# Patient Record
Sex: MALE | Race: WHITE | NOT HISPANIC OR LATINO | Employment: FULL TIME
[De-identification: names, ages, dates, MRNs, and addresses within clinical notes are randomized per-mention and may not be internally consistent; named-entity substitution may affect disease eponyms.]

---

## 2017-01-23 ENCOUNTER — HOSPITAL (OUTPATIENT)
Dept: OTHER | Age: 42
End: 2017-01-23
Attending: ANESTHESIOLOGY

## 2017-01-31 ENCOUNTER — HOSPITAL (OUTPATIENT)
Dept: OTHER | Age: 42
End: 2017-01-31
Attending: ANESTHESIOLOGY

## 2017-03-02 ENCOUNTER — HOSPITAL (OUTPATIENT)
Dept: OTHER | Age: 42
End: 2017-03-02
Attending: FAMILY MEDICINE

## 2017-03-17 ENCOUNTER — HOSPITAL (OUTPATIENT)
Dept: OTHER | Age: 42
End: 2017-03-17
Attending: ANESTHESIOLOGY

## 2017-04-07 ENCOUNTER — HOSPITAL (OUTPATIENT)
Dept: OTHER | Age: 42
End: 2017-04-07
Attending: ANESTHESIOLOGY

## 2017-09-18 ENCOUNTER — HOSPITAL (OUTPATIENT)
Dept: OTHER | Age: 42
End: 2017-09-18
Attending: ANESTHESIOLOGY

## 2017-10-20 ENCOUNTER — HOSPITAL (OUTPATIENT)
Dept: OTHER | Age: 42
End: 2017-10-20
Attending: ANESTHESIOLOGY

## 2017-11-13 ENCOUNTER — HOSPITAL (OUTPATIENT)
Dept: OTHER | Age: 42
End: 2017-11-13
Attending: ANESTHESIOLOGY

## 2017-11-21 ENCOUNTER — HOSPITAL (OUTPATIENT)
Dept: OTHER | Age: 42
End: 2017-11-21
Attending: ANESTHESIOLOGY

## 2018-01-12 ENCOUNTER — HOSPITAL (OUTPATIENT)
Dept: OTHER | Age: 43
End: 2018-01-12
Attending: ANESTHESIOLOGY

## 2018-02-22 ENCOUNTER — HOSPITAL (OUTPATIENT)
Dept: OTHER | Age: 43
End: 2018-02-22
Attending: ANESTHESIOLOGY

## 2018-05-11 ENCOUNTER — HOSPITAL (OUTPATIENT)
Dept: OTHER | Age: 43
End: 2018-05-11
Attending: ANESTHESIOLOGY

## 2018-07-23 ENCOUNTER — HOSPITAL (OUTPATIENT)
Dept: OTHER | Age: 43
End: 2018-07-23
Attending: ANESTHESIOLOGY

## 2018-08-24 ENCOUNTER — HOSPITAL (OUTPATIENT)
Dept: OTHER | Age: 43
End: 2018-08-24
Attending: ANESTHESIOLOGY

## 2018-09-12 ENCOUNTER — HOSPITAL (OUTPATIENT)
Dept: OTHER | Age: 43
End: 2018-09-12
Attending: INTERNAL MEDICINE

## 2018-09-12 LAB
A/G RATIO_: 1.3
ABS LYMPH MAN: 0.9 K/CUMM (ref 1–3.5)
ABS MONO MAN: 0.1 K/CUMM (ref 0.1–0.8)
ABS NEUT MAN: 13.6 K/CUMM (ref 1.8–7.8)
ALBUMIN: 4.5 G/DL (ref 3.5–5)
ALK PHOS: 77 UNIT/L (ref 50–124)
ALT/GPT: 19 UNIT/L (ref 0–55)
ANION GAP SERPL CALC-SCNC: 18 MEQ/L (ref 10–20)
AST/GOT: 21 UNIT/L (ref 5–34)
BAND MAN: 14 % (ref 2–8)
BASOPHIL MAN: 0 % (ref 0–1)
BILI TOTAL: 0.4 MG/DL (ref 0.2–1)
BUN SERPL-MCNC: 21 MG/DL (ref 6–20)
CALCIUM: 10.3 MG/DL (ref 8.4–10.2)
CHLORIDE: 100 MEQ/L (ref 97–107)
CREATININE: 1.12 MG/DL (ref 0.6–1.3)
DIFF_TYPE?: ABNORMAL
EOS MAN: 0 % (ref 0–4)
GLOBULIN_: 3.4 G/DL (ref 2–4.1)
GLUCOSE LVL: 171 MG/DL (ref 70–99)
HCT VFR BLD CALC: 49 % (ref 36–51)
HEMOLYSIS 2+: NEGATIVE
HGB BLD-MCNC: 16.8 G/DL (ref 12–17)
ICTERIC 4+: NEGATIVE
INSTR WBC: 14.6 K/CUMM (ref 4–11)
LIPASE LEVEL: 8 UNIT/L (ref 8–78)
LIPEMIC 3+: NEGATIVE
LYMPH MAN: 6 %
MCH RBC QN AUTO: 30 PG (ref 25–35)
MCHC RBC AUTO-ENTMCNC: 34 G/DL (ref 32–37)
MCV RBC AUTO: 88 FL (ref 78–97)
MONOCYTE MAN: 1 %
NRBC BLD MANUAL-RTO: 0 % (ref 0–0.2)
PLATELET: 289 K/CUMM (ref 150–450)
PLT ESTIMATE: ADEQUATE
POTASSIUM: 4.3 MEQ/L (ref 3.5–5.1)
RBC # BLD: 5.53 M/CUMM (ref 4.2–6)
RBC MORPH: NORMAL
RDW: 12.1 % (ref 11.5–14.5)
SEGS MAN: 79 %
SODIUM: 139 MEQ/L (ref 136–145)
TCO2: 25 MEQ/L (ref 19–29)
TOTAL LYMPHS MANUAL: 6 %
TOTAL PROTEIN: 7.9 G/DL (ref 6.4–8.3)
UA APPEAR: CLEAR
UA BACTERIA: ABNORMAL
UA BILI: NEGATIVE
UA BLOOD: ABNORMAL
UA COLOR: YELLOW
UA EPITHELIAL: ABNORMAL
UA GLUCOSE: NEGATIVE
UA KETONES: NEGATIVE
UA LEUK EST: NEGATIVE
UA NITRITE: NEGATIVE
UA PH: 5.5 (ref 5–7)
UA PROTEIN: NEGATIVE
UA RBC: ABNORMAL
UA SPEC GRAV: 1.02 (ref 1.01–1.02)
UA UROBILINOGEN: 0.2 MG/DL (ref 0.2–1)
UA WBC: ABNORMAL
WBC # BLD: 14.6 K/CUMM (ref 4–11)

## 2018-09-13 LAB
ABS LYMPH MAN: 0.5 K/CUMM (ref 1–3.5)
ABS MONO MAN: 0.9 K/CUMM (ref 0.1–0.8)
ABS NEUT MAN: 5.4 K/CUMM (ref 1.8–7.8)
ANION GAP SERPL CALC-SCNC: 14 MEQ/L (ref 10–20)
BAND MAN: 15 % (ref 2–8)
BASOPHIL MAN: 0 % (ref 0–1)
BUN SERPL-MCNC: 24 MG/DL (ref 6–20)
CALCIUM: 8.8 MG/DL (ref 8.4–10.2)
CHLORIDE: 106 MEQ/L (ref 97–107)
CREATININE: 0.92 MG/DL (ref 0.6–1.3)
DIFF_TYPE?: ABNORMAL
EOS MAN: 2 % (ref 0–4)
GLUCOSE LVL: 137 MG/DL (ref 70–99)
HCT VFR BLD CALC: 43 % (ref 36–51)
HEMOLYSIS 2+: NEGATIVE
HGB BLD-MCNC: 14.7 G/DL (ref 12–17)
INSTR WBC: 6.9 K/CUMM (ref 4–11)
LYMPH MAN: 5 %
MCH RBC QN AUTO: 31 PG (ref 25–35)
MCHC RBC AUTO-ENTMCNC: 34 G/DL (ref 32–37)
MCV RBC AUTO: 90 FL (ref 78–97)
MONOCYTE MAN: 13 %
NRBC BLD MANUAL-RTO: 0 % (ref 0–0.2)
PLATELET: 236 K/CUMM (ref 150–450)
PLT ESTIMATE: ADEQUATE
POTASSIUM: 4.5 MEQ/L (ref 3.5–5.1)
RBC # BLD: 4.74 M/CUMM (ref 4.2–6)
RBC MORPH: NORMAL
RDW: 12.5 % (ref 11.5–14.5)
REACT LYMPH MAN: 2 %
SEGS MAN: 63 %
SODIUM: 138 MEQ/L (ref 136–145)
TCO2: 23 MEQ/L (ref 19–29)
TOTAL LYMPHS MANUAL: 7 %
WBC # BLD: 6.9 K/CUMM (ref 4–11)

## 2018-09-14 LAB
ABS LYMPH MAN: 1.8 K/CUMM (ref 1–3.5)
ABS MONO MAN: 0.2 K/CUMM (ref 0.1–0.8)
ABS NEUT MAN: 4.3 K/CUMM (ref 1.8–7.8)
ANION GAP SERPL CALC-SCNC: 9 MEQ/L (ref 10–20)
BAND MAN: 3 % (ref 2–8)
BASOPHIL MAN: 0 % (ref 0–1)
BUN SERPL-MCNC: 23 MG/DL (ref 6–20)
CALCIUM: 8.7 MG/DL (ref 8.4–10.2)
CHLORIDE: 105 MEQ/L (ref 97–107)
CREATININE: 0.95 MG/DL (ref 0.6–1.3)
DIFF_TYPE?: ABNORMAL
EOS MAN: 2 % (ref 0–4)
GLUCOSE LVL: 105 MG/DL (ref 70–99)
HCT VFR BLD CALC: 37 % (ref 36–51)
HEMOLYSIS 2+: NEGATIVE
HEMOLYSIS 2+: NEGATIVE
HEMOLYSIS 3+: NEGATIVE
HGB BLD-MCNC: 12.4 G/DL (ref 12–17)
INSTR WBC: 6.4 K/CUMM (ref 4–11)
LIPEMIC 4+: NEGATIVE
LYMPH MAN: 27 %
MAGNESIUM LEVEL: 2.3 MG/DL (ref 1.6–2.6)
MCH RBC QN AUTO: 31 PG (ref 25–35)
MCHC RBC AUTO-ENTMCNC: 33 G/DL (ref 32–37)
MCV RBC AUTO: 92 FL (ref 78–97)
MONOCYTE MAN: 3 %
NRBC BLD MANUAL-RTO: 0 % (ref 0–0.2)
PHOSPHORUS: 2.3 MG/DL (ref 2.3–4.7)
PLATELET: 182 K/CUMM (ref 150–450)
PLT ESTIMATE: ADEQUATE
POTASSIUM: 4.7 MEQ/L (ref 3.5–5.1)
RBC # BLD: 4.05 M/CUMM (ref 4.2–6)
RBC MORPH: NORMAL
RDW: 12.1 % (ref 11.5–14.5)
REACT LYMPH MAN: 1 %
SEGS MAN: 64 %
SODIUM: 139 MEQ/L (ref 136–145)
TCO2: 30 MEQ/L (ref 19–29)
TOTAL LYMPHS MANUAL: 28 %
UA APPEAR: CLEAR
UA BILI: NEGATIVE
UA BLOOD: NEGATIVE
UA COLOR: YELLOW
UA GLUCOSE: NEGATIVE
UA KETONES: NEGATIVE
UA LEUK EST: NEGATIVE
UA NITRITE: NEGATIVE
UA PH: 7 (ref 5–7)
UA PROTEIN: NEGATIVE
UA SPEC GRAV: 1.01 (ref 1.01–1.02)
UA UROBILINOGEN: 0.2 MG/DL (ref 0.2–1)
WBC # BLD: 6.4 K/CUMM (ref 4–11)

## 2018-09-15 LAB
ANION GAP SERPL CALC-SCNC: 9 MEQ/L (ref 10–20)
BUN SERPL-MCNC: 11 MG/DL (ref 6–20)
CALCIUM: 8.8 MG/DL (ref 8.4–10.2)
CHLORIDE: 107 MEQ/L (ref 97–107)
CREATININE: 0.82 MG/DL (ref 0.6–1.3)
GLUCOSE LVL: 113 MG/DL (ref 70–99)
HEMOLYSIS 2+: NEGATIVE
HEMOLYSIS 2+: NEGATIVE
MAGNESIUM LEVEL: 2.1 MG/DL (ref 1.6–2.6)
POTASSIUM: 4.7 MEQ/L (ref 3.5–5.1)
SODIUM: 140 MEQ/L (ref 136–145)
TCO2: 29 MEQ/L (ref 19–29)

## 2019-04-08 ENCOUNTER — HOSPITAL (OUTPATIENT)
Dept: OTHER | Age: 44
End: 2019-04-08
Attending: ANESTHESIOLOGY

## 2019-05-03 ENCOUNTER — HOSPITAL (OUTPATIENT)
Dept: OTHER | Age: 44
End: 2019-05-03
Attending: ANESTHESIOLOGY

## 2019-06-04 ENCOUNTER — HOSPITAL (OUTPATIENT)
Dept: OTHER | Age: 44
End: 2019-06-04

## 2019-06-04 ENCOUNTER — HOSPITAL (OUTPATIENT)
Dept: OTHER | Age: 44
End: 2019-06-04
Attending: ANESTHESIOLOGY

## 2020-07-02 PROBLEM — Z12.5 SCREENING FOR PROSTATE CANCER: Status: ACTIVE | Noted: 2019-06-05

## 2020-07-02 PROBLEM — E55.9 VITAMIN D DEFICIENCY: Status: ACTIVE | Noted: 2019-07-10

## 2020-07-02 PROBLEM — H92.01 RIGHT EAR PAIN: Status: ACTIVE | Noted: 2020-02-26

## 2020-07-02 PROBLEM — E66.3 OVERWEIGHT WITH BODY MASS INDEX (BMI) 25.0-29.9: Status: ACTIVE | Noted: 2019-07-10

## 2020-07-02 PROBLEM — G43.909 MIGRAINE: Status: ACTIVE | Noted: 2020-07-02

## 2020-07-02 PROBLEM — F41.9 ANXIETY: Status: ACTIVE | Noted: 2020-07-02

## 2020-07-06 ENCOUNTER — HOSPITAL ENCOUNTER (OUTPATIENT)
Dept: GENERAL RADIOLOGY | Age: 45
Discharge: HOME | End: 2020-07-06
Attending: INTERNAL MEDICINE
Payer: COMMERCIAL

## 2020-07-06 DIAGNOSIS — M54.14 THORACIC RADICULOPATHY: ICD-10-CM

## 2020-07-06 DIAGNOSIS — M51.36 LUMBAR DEGENERATIVE DISC DISEASE: ICD-10-CM

## 2020-07-06 PROCEDURE — 72110 X-RAY EXAM L-2 SPINE 4/>VWS: CPT | Performed by: INTERNAL MEDICINE

## 2020-07-06 PROCEDURE — 72072 X-RAY EXAM THORAC SPINE 3VWS: CPT | Performed by: INTERNAL MEDICINE

## 2020-09-21 ENCOUNTER — HOSPITAL (OUTPATIENT)
Dept: OTHER | Age: 45
End: 2020-09-21
Attending: FAMILY MEDICINE

## 2020-12-03 ENCOUNTER — HOSPITAL (OUTPATIENT)
Dept: OTHER | Age: 45
End: 2020-12-03

## 2020-12-04 LAB
SARS-COV-2 RNA RESP QL NAA+PROBE: NOT DETECTED
SPECIMEN SOURCE: NORMAL

## 2021-03-08 ENCOUNTER — HOSPITAL ENCOUNTER (EMERGENCY)
Age: 46
Discharge: HOME | End: 2021-03-08

## 2021-03-08 ENCOUNTER — WALK IN (OUTPATIENT)
Dept: URGENT CARE | Age: 46
End: 2021-03-08
Attending: EMERGENCY MEDICINE

## 2021-03-08 ENCOUNTER — APPOINTMENT (OUTPATIENT)
Dept: CT IMAGING | Age: 46
End: 2021-03-08

## 2021-03-08 VITALS
HEIGHT: 72 IN | WEIGHT: 185 LBS | SYSTOLIC BLOOD PRESSURE: 119 MMHG | HEART RATE: 67 BPM | TEMPERATURE: 98.3 F | DIASTOLIC BLOOD PRESSURE: 78 MMHG | OXYGEN SATURATION: 95 % | BODY MASS INDEX: 25.06 KG/M2 | RESPIRATION RATE: 17 BRPM

## 2021-03-08 VITALS
TEMPERATURE: 98.1 F | SYSTOLIC BLOOD PRESSURE: 119 MMHG | DIASTOLIC BLOOD PRESSURE: 79 MMHG | RESPIRATION RATE: 14 BRPM | OXYGEN SATURATION: 97 % | BODY MASS INDEX: 24.41 KG/M2 | WEIGHT: 185 LBS | HEART RATE: 68 BPM

## 2021-03-08 DIAGNOSIS — J02.9 SORE THROAT: Primary | ICD-10-CM

## 2021-03-08 DIAGNOSIS — R10.9 ABDOMINAL PAIN, UNSPECIFIED ABDOMINAL LOCATION: Primary | ICD-10-CM

## 2021-03-08 DIAGNOSIS — K59.00 CONSTIPATION, UNSPECIFIED CONSTIPATION TYPE: ICD-10-CM

## 2021-03-08 LAB
ALBUMIN SERPL-MCNC: 3.6 G/DL (ref 3.6–5.1)
ALBUMIN/GLOB SERPL: 1.2 {RATIO} (ref 1–2.4)
ALP SERPL-CCNC: 61 UNITS/L (ref 45–117)
ALT SERPL-CCNC: 87 UNITS/L
ANION GAP SERPL CALC-SCNC: 10 MMOL/L (ref 10–20)
APPEARANCE UR: CLEAR
AST SERPL-CCNC: 40 UNITS/L
BASOPHILS # BLD: 0.1 K/MCL (ref 0–0.3)
BASOPHILS NFR BLD: 1 %
BILIRUB SERPL-MCNC: 0.3 MG/DL (ref 0.2–1)
BILIRUB UR QL STRIP: NEGATIVE
BUN SERPL-MCNC: 16 MG/DL (ref 6–20)
BUN/CREAT SERPL: 18 (ref 7–25)
CALCIUM SERPL-MCNC: 8.4 MG/DL (ref 8.4–10.2)
CHLORIDE SERPL-SCNC: 104 MMOL/L (ref 98–107)
CO2 SERPL-SCNC: 27 MMOL/L (ref 21–32)
COLOR UR: YELLOW
CREAT SERPL-MCNC: 0.87 MG/DL (ref 0.67–1.17)
DEPRECATED RDW RBC: 40.1 FL (ref 39–50)
EOSINOPHIL # BLD: 0.4 K/MCL (ref 0–0.5)
EOSINOPHIL NFR BLD: 5 %
ERYTHROCYTE [DISTWIDTH] IN BLOOD: 12.2 % (ref 11–15)
FASTING DURATION TIME PATIENT: ABNORMAL H
GFR SERPLBLD BASED ON 1.73 SQ M-ARVRAT: >90 ML/MIN/1.73M2
GLOBULIN SER-MCNC: 3.1 G/DL (ref 2–4)
GLUCOSE SERPL-MCNC: 90 MG/DL (ref 65–99)
GLUCOSE UR STRIP-MCNC: NEGATIVE MG/DL
HCT VFR BLD CALC: 40.7 % (ref 39–51)
HGB BLD-MCNC: 13.9 G/DL (ref 13–17)
HGB UR QL STRIP: NEGATIVE
IMM GRANULOCYTES # BLD AUTO: 0 K/MCL (ref 0–0.2)
IMM GRANULOCYTES # BLD: 0 %
INTERNAL PROCEDURAL CONTROLS ACCEPTABLE: YES
KETONES UR STRIP-MCNC: NEGATIVE MG/DL
LACTATE BLDV-SCNC: 0.6 MMOL/L (ref 0–2)
LEUKOCYTE ESTERASE UR QL STRIP: NEGATIVE
LIPASE SERPL-CCNC: 200 UNITS/L (ref 73–393)
LYMPHOCYTES # BLD: 2.5 K/MCL (ref 1–4.8)
LYMPHOCYTES NFR BLD: 35 %
MCH RBC QN AUTO: 31 PG (ref 26–34)
MCHC RBC AUTO-ENTMCNC: 34.2 G/DL (ref 32–36.5)
MCV RBC AUTO: 90.6 FL (ref 78–100)
MONOCYTES # BLD: 0.7 K/MCL (ref 0.3–0.9)
MONOCYTES NFR BLD: 9 %
NEUTROPHILS # BLD: 3.6 K/MCL (ref 1.8–7.7)
NEUTROPHILS NFR BLD: 50 %
NITRITE UR QL STRIP: NEGATIVE
NRBC BLD MANUAL-RTO: 0 /100 WBC
PH UR STRIP: 6 [PH] (ref 5–7)
PLATELET # BLD AUTO: 249 K/MCL (ref 140–450)
POTASSIUM SERPL-SCNC: 4.7 MMOL/L (ref 3.4–5.1)
PROT SERPL-MCNC: 6.7 G/DL (ref 6.4–8.2)
PROT UR STRIP-MCNC: NEGATIVE MG/DL
RBC # BLD: 4.49 MIL/MCL (ref 4.5–5.9)
S PYO AG THROAT QL IA.RAPID: NEGATIVE
SARS-COV+SARS-COV-2 AG RESP QL IA.RAPID: NOT DETECTED
SODIUM SERPL-SCNC: 136 MMOL/L (ref 135–145)
SP GR UR STRIP: 1.01 (ref 1–1.03)
UROBILINOGEN UR STRIP-MCNC: 0.2 MG/DL
WBC # BLD: 7.2 K/MCL (ref 4.2–11)

## 2021-03-08 PROCEDURE — 74177 CT ABD & PELVIS W/CONTRAST: CPT

## 2021-03-08 PROCEDURE — 81003 URINALYSIS AUTO W/O SCOPE: CPT | Performed by: NURSE PRACTITIONER

## 2021-03-08 PROCEDURE — 10002807 HB RX 258: Performed by: NURSE PRACTITIONER

## 2021-03-08 PROCEDURE — 96361 HYDRATE IV INFUSION ADD-ON: CPT

## 2021-03-08 PROCEDURE — 83605 ASSAY OF LACTIC ACID: CPT | Performed by: NURSE PRACTITIONER

## 2021-03-08 PROCEDURE — 96374 THER/PROPH/DIAG INJ IV PUSH: CPT

## 2021-03-08 PROCEDURE — 87426 SARSCOV CORONAVIRUS AG IA: CPT | Performed by: EMERGENCY MEDICINE

## 2021-03-08 PROCEDURE — 83690 ASSAY OF LIPASE: CPT | Performed by: NURSE PRACTITIONER

## 2021-03-08 PROCEDURE — 85025 COMPLETE CBC W/AUTO DIFF WBC: CPT | Performed by: NURSE PRACTITIONER

## 2021-03-08 PROCEDURE — 10002805 HB CONTRAST AGENT: Performed by: NURSE PRACTITIONER

## 2021-03-08 PROCEDURE — 96375 TX/PRO/DX INJ NEW DRUG ADDON: CPT

## 2021-03-08 PROCEDURE — 10002800 HB RX 250 W HCPCS: Performed by: PHYSICIAN ASSISTANT

## 2021-03-08 PROCEDURE — 10002800 HB RX 250 W HCPCS: Performed by: NURSE PRACTITIONER

## 2021-03-08 PROCEDURE — 87880 STREP A ASSAY W/OPTIC: CPT

## 2021-03-08 PROCEDURE — 99204 OFFICE O/P NEW MOD 45 MIN: CPT

## 2021-03-08 PROCEDURE — 80053 COMPREHEN METABOLIC PANEL: CPT | Performed by: NURSE PRACTITIONER

## 2021-03-08 PROCEDURE — C9803 HOPD COVID-19 SPEC COLLECT: HCPCS

## 2021-03-08 PROCEDURE — 99284 EMERGENCY DEPT VISIT MOD MDM: CPT

## 2021-03-08 RX ORDER — POLYETHYLENE GLYCOL 3350 17 G/17G
17 POWDER, FOR SOLUTION ORAL DAILY
Qty: 255 G | Refills: 0 | Status: SHIPPED | OUTPATIENT
Start: 2021-03-08

## 2021-03-08 RX ORDER — ONDANSETRON 2 MG/ML
4 INJECTION INTRAMUSCULAR; INTRAVENOUS ONCE
Status: COMPLETED | OUTPATIENT
Start: 2021-03-08 | End: 2021-03-08

## 2021-03-08 RX ADMIN — MORPHINE SULFATE 2 MG: 2 INJECTION, SOLUTION INTRAMUSCULAR; INTRAVENOUS at 12:23

## 2021-03-08 RX ADMIN — ONDANSETRON 4 MG: 2 INJECTION INTRAMUSCULAR; INTRAVENOUS at 12:23

## 2021-03-08 RX ADMIN — IOHEXOL 75 ML: 350 INJECTION, SOLUTION INTRAVENOUS at 14:15

## 2021-03-08 RX ADMIN — KETOROLAC TROMETHAMINE 30 MG: 30 INJECTION, SOLUTION INTRAMUSCULAR; INTRAVENOUS at 15:17

## 2021-03-08 RX ADMIN — SODIUM CHLORIDE 1000 ML: 0.9 INJECTION, SOLUTION INTRAVENOUS at 12:23

## 2021-03-08 ASSESSMENT — ENCOUNTER SYMPTOMS
ANOREXIA: 0
PSYCHIATRIC NEGATIVE: 1
ENDOCRINE NEGATIVE: 1
NAUSEA: 1
VOMITING: 1
BACK PAIN: 0
ENDOCRINE NEGATIVE: 1
NEUROLOGICAL NEGATIVE: 1
CONSTIPATION: 1
SHORTNESS OF BREATH: 0
ABDOMINAL PAIN: 1
PSYCHIATRIC NEGATIVE: 1
HEMATOLOGIC/LYMPHATIC NEGATIVE: 1
EYES NEGATIVE: 1
FEVER: 0
EYES NEGATIVE: 1
CONSTITUTIONAL NEGATIVE: 1
DIARRHEA: 0
RESPIRATORY NEGATIVE: 1
RESPIRATORY NEGATIVE: 1
FATIGUE: 1
HEMATOLOGIC/LYMPHATIC NEGATIVE: 1
NEUROLOGICAL NEGATIVE: 1
ALLERGIC/IMMUNOLOGIC NEGATIVE: 1
DIAPHORESIS: 0
ALLERGIC/IMMUNOLOGIC NEGATIVE: 1
GASTROINTESTINAL NEGATIVE: 1

## 2021-03-08 ASSESSMENT — PAIN SCALES - GENERAL: PAINLEVEL_OUTOF10: 6

## 2021-04-16 ENCOUNTER — WALK IN (OUTPATIENT)
Dept: URGENT CARE | Age: 46
End: 2021-04-16
Attending: FAMILY MEDICINE

## 2021-04-16 DIAGNOSIS — R11.11 VOMITING WITHOUT NAUSEA, INTRACTABILITY OF VOMITING NOT SPECIFIED, UNSPECIFIED VOMITING TYPE: Primary | ICD-10-CM

## 2021-04-16 DIAGNOSIS — J30.1 SEASONAL ALLERGIC RHINITIS DUE TO POLLEN: ICD-10-CM

## 2021-04-16 LAB — SARS-COV+SARS-COV-2 AG RESP QL IA.RAPID: NOT DETECTED

## 2021-04-16 PROCEDURE — C9803 HOPD COVID-19 SPEC COLLECT: HCPCS

## 2021-04-16 PROCEDURE — 87426 SARSCOV CORONAVIRUS AG IA: CPT | Performed by: FAMILY MEDICINE

## 2021-04-16 PROCEDURE — 99213 OFFICE O/P EST LOW 20 MIN: CPT

## 2021-04-16 RX ORDER — ALPRAZOLAM 0.5 MG/1
0.5 TABLET ORAL NIGHTLY PRN
COMMUNITY

## 2021-04-16 RX ORDER — FLUTICASONE PROPIONATE 50 MCG
2 SPRAY, SUSPENSION (ML) NASAL DAILY
Qty: 16 G | Refills: 12 | Status: SHIPPED | OUTPATIENT
Start: 2021-04-16

## 2021-04-16 RX ORDER — MONTELUKAST SODIUM 10 MG/1
10 TABLET ORAL NIGHTLY
Qty: 30 TABLET | Refills: 0 | Status: SHIPPED | OUTPATIENT
Start: 2021-04-16

## 2021-04-16 RX ORDER — HYDROCODONE BITARTRATE AND ACETAMINOPHEN 10; 325 MG/1; MG/1
1 TABLET ORAL EVERY 6 HOURS PRN
COMMUNITY

## 2021-04-16 ASSESSMENT — PAIN SCALES - GENERAL: PAINLEVEL: 0

## 2021-04-21 ASSESSMENT — ENCOUNTER SYMPTOMS
BRUISES/BLEEDS EASILY: 0
VOMITING: 1
FATIGUE: 0
SORE THROAT: 0
COUGH: 0
EYE REDNESS: 0
RHINORRHEA: 0
FEVER: 0
DIARRHEA: 0
ADENOPATHY: 0
CHILLS: 0
BACK PAIN: 0
CONFUSION: 0
AGITATION: 0
HEADACHES: 0
NAUSEA: 0
SHORTNESS OF BREATH: 0
WEAKNESS: 0
ABDOMINAL PAIN: 0

## 2021-05-06 ENCOUNTER — HOSPITAL ENCOUNTER (EMERGENCY)
Age: 46
Discharge: HOME | End: 2021-05-06
Attending: EMERGENCY MEDICINE

## 2021-05-06 VITALS
TEMPERATURE: 97.9 F | OXYGEN SATURATION: 97 % | SYSTOLIC BLOOD PRESSURE: 108 MMHG | BODY MASS INDEX: 24.62 KG/M2 | DIASTOLIC BLOOD PRESSURE: 64 MMHG | HEIGHT: 72 IN | HEART RATE: 74 BPM | RESPIRATION RATE: 18 BRPM | WEIGHT: 181.77 LBS

## 2021-05-06 DIAGNOSIS — T50.Z95A ADVERSE EFFECT OF VACCINE, INITIAL ENCOUNTER: Primary | ICD-10-CM

## 2021-05-06 LAB
ALBUMIN SERPL-MCNC: 3.7 G/DL (ref 3.6–5.1)
ALBUMIN/GLOB SERPL: 1.2 {RATIO} (ref 1–2.4)
ALP SERPL-CCNC: 62 UNITS/L (ref 45–117)
ALT SERPL-CCNC: 27 UNITS/L
ANION GAP SERPL CALC-SCNC: 13 MMOL/L (ref 10–20)
AST SERPL-CCNC: 15 UNITS/L
BASOPHILS # BLD: 0.1 K/MCL (ref 0–0.3)
BASOPHILS NFR BLD: 1 %
BILIRUB SERPL-MCNC: 0.2 MG/DL (ref 0.2–1)
BUN SERPL-MCNC: 12 MG/DL (ref 6–20)
BUN/CREAT SERPL: 16 (ref 7–25)
CALCIUM SERPL-MCNC: 9 MG/DL (ref 8.4–10.2)
CHLORIDE SERPL-SCNC: 104 MMOL/L (ref 98–107)
CO2 SERPL-SCNC: 27 MMOL/L (ref 21–32)
CREAT SERPL-MCNC: 0.77 MG/DL (ref 0.67–1.17)
DEPRECATED RDW RBC: 39.4 FL (ref 39–50)
EOSINOPHIL # BLD: 0.3 K/MCL (ref 0–0.5)
EOSINOPHIL NFR BLD: 7 %
ERYTHROCYTE [DISTWIDTH] IN BLOOD: 11.9 % (ref 11–15)
FASTING DURATION TIME PATIENT: ABNORMAL H
GFR SERPLBLD BASED ON 1.73 SQ M-ARVRAT: >90 ML/MIN/1.73M2
GLOBULIN SER-MCNC: 3.1 G/DL (ref 2–4)
GLUCOSE SERPL-MCNC: 102 MG/DL (ref 65–99)
HCT VFR BLD CALC: 40.6 % (ref 39–51)
HGB BLD-MCNC: 13.5 G/DL (ref 13–17)
IMM GRANULOCYTES # BLD AUTO: 0 K/MCL (ref 0–0.2)
IMM GRANULOCYTES # BLD: 0 %
LIPASE SERPL-CCNC: 190 UNITS/L (ref 73–393)
LYMPHOCYTES # BLD: 1.8 K/MCL (ref 1–4.8)
LYMPHOCYTES NFR BLD: 42 %
MCH RBC QN AUTO: 30.1 PG (ref 26–34)
MCHC RBC AUTO-ENTMCNC: 33.3 G/DL (ref 32–36.5)
MCV RBC AUTO: 90.6 FL (ref 78–100)
MONOCYTES # BLD: 0.5 K/MCL (ref 0.3–0.9)
MONOCYTES NFR BLD: 12 %
NEUTROPHILS # BLD: 1.6 K/MCL (ref 1.8–7.7)
NEUTROPHILS NFR BLD: 38 %
NRBC BLD MANUAL-RTO: 0 /100 WBC
PLATELET # BLD AUTO: 226 K/MCL (ref 140–450)
POTASSIUM SERPL-SCNC: 4.2 MMOL/L (ref 3.4–5.1)
PROT SERPL-MCNC: 6.8 G/DL (ref 6.4–8.2)
RBC # BLD: 4.48 MIL/MCL (ref 4.5–5.9)
SODIUM SERPL-SCNC: 140 MMOL/L (ref 135–145)
WBC # BLD: 4.2 K/MCL (ref 4.2–11)

## 2021-05-06 PROCEDURE — 96375 TX/PRO/DX INJ NEW DRUG ADDON: CPT

## 2021-05-06 PROCEDURE — 10002807 HB RX 258: Performed by: EMERGENCY MEDICINE

## 2021-05-06 PROCEDURE — 83690 ASSAY OF LIPASE: CPT | Performed by: EMERGENCY MEDICINE

## 2021-05-06 PROCEDURE — 85025 COMPLETE CBC W/AUTO DIFF WBC: CPT | Performed by: EMERGENCY MEDICINE

## 2021-05-06 PROCEDURE — 10002800 HB RX 250 W HCPCS: Performed by: EMERGENCY MEDICINE

## 2021-05-06 PROCEDURE — 80053 COMPREHEN METABOLIC PANEL: CPT | Performed by: EMERGENCY MEDICINE

## 2021-05-06 PROCEDURE — 96361 HYDRATE IV INFUSION ADD-ON: CPT

## 2021-05-06 PROCEDURE — 96374 THER/PROPH/DIAG INJ IV PUSH: CPT

## 2021-05-06 PROCEDURE — 99284 EMERGENCY DEPT VISIT MOD MDM: CPT

## 2021-05-06 RX ORDER — ONDANSETRON 2 MG/ML
4 INJECTION INTRAMUSCULAR; INTRAVENOUS ONCE
Status: COMPLETED | OUTPATIENT
Start: 2021-05-06 | End: 2021-05-06

## 2021-05-06 RX ORDER — ONDANSETRON 4 MG/1
4 TABLET, ORALLY DISINTEGRATING ORAL EVERY 8 HOURS PRN
Qty: 12 TABLET | Refills: 0 | Status: SHIPPED | OUTPATIENT
Start: 2021-05-06

## 2021-05-06 RX ADMIN — KETOROLAC TROMETHAMINE 15 MG: 30 INJECTION, SOLUTION INTRAMUSCULAR; INTRAVENOUS at 04:57

## 2021-05-06 RX ADMIN — SODIUM CHLORIDE 1000 ML: 0.9 INJECTION, SOLUTION INTRAVENOUS at 04:50

## 2021-05-06 RX ADMIN — ONDANSETRON 4 MG: 2 INJECTION INTRAMUSCULAR; INTRAVENOUS at 04:50

## 2021-05-06 ASSESSMENT — PAIN SCALES - GENERAL: PAINLEVEL_OUTOF10: 0

## 2021-05-25 VITALS
WEIGHT: 185 LBS | HEIGHT: 73 IN | BODY MASS INDEX: 24.52 KG/M2 | SYSTOLIC BLOOD PRESSURE: 128 MMHG | RESPIRATION RATE: 16 BRPM | DIASTOLIC BLOOD PRESSURE: 87 MMHG | OXYGEN SATURATION: 100 % | TEMPERATURE: 98.1 F | HEART RATE: 75 BPM

## 2021-06-02 ENCOUNTER — HOSPITAL ENCOUNTER (OUTPATIENT)
Dept: ULTRASOUND IMAGING | Facility: HOSPITAL | Age: 46
Discharge: HOME | End: 2021-06-02
Attending: INTERNAL MEDICINE
Payer: COMMERCIAL

## 2021-06-02 DIAGNOSIS — R10.84 GENERALIZED ABDOMINAL PAIN: ICD-10-CM

## 2021-06-02 PROCEDURE — 76700 US EXAM ABDOM COMPLETE: CPT | Performed by: INTERNAL MEDICINE

## 2021-06-10 ENCOUNTER — LAB ENCOUNTER (OUTPATIENT)
Dept: LAB | Age: 46
End: 2021-06-10
Attending: SURGERY
Payer: COMMERCIAL

## 2021-06-10 DIAGNOSIS — Z01.818 PREOP TESTING: ICD-10-CM

## 2021-06-12 ENCOUNTER — ANESTHESIA (OUTPATIENT)
Dept: SURGERY | Facility: HOSPITAL | Age: 46
End: 2021-06-12
Payer: COMMERCIAL

## 2021-06-12 ENCOUNTER — HOSPITAL ENCOUNTER (OUTPATIENT)
Facility: HOSPITAL | Age: 46
Setting detail: HOSPITAL OUTPATIENT SURGERY
Discharge: HOME | End: 2021-06-12
Attending: SURGERY | Admitting: SURGERY
Payer: COMMERCIAL

## 2021-06-12 ENCOUNTER — ANESTHESIA EVENT (OUTPATIENT)
Dept: SURGERY | Facility: HOSPITAL | Age: 46
End: 2021-06-12
Payer: COMMERCIAL

## 2021-06-12 VITALS
SYSTOLIC BLOOD PRESSURE: 141 MMHG | HEART RATE: 69 BPM | DIASTOLIC BLOOD PRESSURE: 74 MMHG | HEIGHT: 72 IN | WEIGHT: 188.63 LBS | BODY MASS INDEX: 25.55 KG/M2 | OXYGEN SATURATION: 97 % | TEMPERATURE: 98 F | RESPIRATION RATE: 18 BRPM

## 2021-06-12 DIAGNOSIS — Z01.818 PREOP TESTING: Primary | ICD-10-CM

## 2021-06-12 PROCEDURE — 88304 TISSUE EXAM BY PATHOLOGIST: CPT | Performed by: SURGERY

## 2021-06-12 PROCEDURE — 0FT44ZZ RESECTION OF GALLBLADDER, PERCUTANEOUS ENDOSCOPIC APPROACH: ICD-10-PCS | Performed by: SURGERY

## 2021-06-12 RX ORDER — MORPHINE SULFATE 10 MG/ML
6 INJECTION, SOLUTION INTRAMUSCULAR; INTRAVENOUS EVERY 10 MIN PRN
Status: DISCONTINUED | OUTPATIENT
Start: 2021-06-12 | End: 2021-06-12

## 2021-06-12 RX ORDER — SODIUM CHLORIDE 9 MG/ML
INJECTION, SOLUTION INTRAVENOUS CONTINUOUS
Status: CANCELLED | OUTPATIENT
Start: 2021-06-12

## 2021-06-12 RX ORDER — CEFAZOLIN SODIUM/WATER 2 G/20 ML
2 SYRINGE (ML) INTRAVENOUS ONCE
Status: COMPLETED | OUTPATIENT
Start: 2021-06-12 | End: 2021-06-12

## 2021-06-12 RX ORDER — DEXAMETHASONE SODIUM PHOSPHATE 4 MG/ML
VIAL (ML) INJECTION AS NEEDED
Status: DISCONTINUED | OUTPATIENT
Start: 2021-06-12 | End: 2021-06-12 | Stop reason: SURG

## 2021-06-12 RX ORDER — PROCHLORPERAZINE EDISYLATE 5 MG/ML
5 INJECTION INTRAMUSCULAR; INTRAVENOUS ONCE AS NEEDED
Status: DISCONTINUED | OUTPATIENT
Start: 2021-06-12 | End: 2021-06-12

## 2021-06-12 RX ORDER — ACETAMINOPHEN 500 MG
1000 TABLET ORAL ONCE
Status: COMPLETED | OUTPATIENT
Start: 2021-06-12 | End: 2021-06-12

## 2021-06-12 RX ORDER — HYDROCODONE BITARTRATE AND ACETAMINOPHEN 5; 325 MG/1; MG/1
2 TABLET ORAL AS NEEDED
Status: COMPLETED | OUTPATIENT
Start: 2021-06-12 | End: 2021-06-12

## 2021-06-12 RX ORDER — NALOXONE HYDROCHLORIDE 0.4 MG/ML
80 INJECTION, SOLUTION INTRAMUSCULAR; INTRAVENOUS; SUBCUTANEOUS AS NEEDED
Status: DISCONTINUED | OUTPATIENT
Start: 2021-06-12 | End: 2021-06-12

## 2021-06-12 RX ORDER — ONDANSETRON 2 MG/ML
4 INJECTION INTRAMUSCULAR; INTRAVENOUS EVERY 6 HOURS PRN
Status: CANCELLED | OUTPATIENT
Start: 2021-06-12

## 2021-06-12 RX ORDER — HYDROMORPHONE HYDROCHLORIDE 1 MG/ML
0.2 INJECTION, SOLUTION INTRAMUSCULAR; INTRAVENOUS; SUBCUTANEOUS EVERY 5 MIN PRN
Status: DISCONTINUED | OUTPATIENT
Start: 2021-06-12 | End: 2021-06-12

## 2021-06-12 RX ORDER — SODIUM CHLORIDE, SODIUM LACTATE, POTASSIUM CHLORIDE, CALCIUM CHLORIDE 600; 310; 30; 20 MG/100ML; MG/100ML; MG/100ML; MG/100ML
INJECTION, SOLUTION INTRAVENOUS CONTINUOUS
Status: DISCONTINUED | OUTPATIENT
Start: 2021-06-12 | End: 2021-06-12

## 2021-06-12 RX ORDER — HYDROMORPHONE HYDROCHLORIDE 1 MG/ML
0.4 INJECTION, SOLUTION INTRAMUSCULAR; INTRAVENOUS; SUBCUTANEOUS EVERY 5 MIN PRN
Status: DISCONTINUED | OUTPATIENT
Start: 2021-06-12 | End: 2021-06-12

## 2021-06-12 RX ORDER — HYDROCODONE BITARTRATE AND ACETAMINOPHEN 7.5; 325 MG/1; MG/1
1 TABLET ORAL EVERY 6 HOURS PRN
Status: DISCONTINUED | OUTPATIENT
Start: 2021-06-12 | End: 2021-06-12

## 2021-06-12 RX ORDER — HYDROCODONE BITARTRATE AND ACETAMINOPHEN 5; 325 MG/1; MG/1
1 TABLET ORAL AS NEEDED
Status: COMPLETED | OUTPATIENT
Start: 2021-06-12 | End: 2021-06-12

## 2021-06-12 RX ORDER — ONDANSETRON 2 MG/ML
4 INJECTION INTRAMUSCULAR; INTRAVENOUS ONCE AS NEEDED
Status: COMPLETED | OUTPATIENT
Start: 2021-06-12 | End: 2021-06-12

## 2021-06-12 RX ORDER — ONDANSETRON 2 MG/ML
INJECTION INTRAMUSCULAR; INTRAVENOUS AS NEEDED
Status: DISCONTINUED | OUTPATIENT
Start: 2021-06-12 | End: 2021-06-12 | Stop reason: SURG

## 2021-06-12 RX ORDER — ROCURONIUM BROMIDE 10 MG/ML
INJECTION, SOLUTION INTRAVENOUS AS NEEDED
Status: DISCONTINUED | OUTPATIENT
Start: 2021-06-12 | End: 2021-06-12 | Stop reason: SURG

## 2021-06-12 RX ORDER — HYDROMORPHONE HYDROCHLORIDE 1 MG/ML
0.6 INJECTION, SOLUTION INTRAMUSCULAR; INTRAVENOUS; SUBCUTANEOUS EVERY 5 MIN PRN
Status: DISCONTINUED | OUTPATIENT
Start: 2021-06-12 | End: 2021-06-12

## 2021-06-12 RX ORDER — GLYCOPYRROLATE 0.2 MG/ML
INJECTION, SOLUTION INTRAMUSCULAR; INTRAVENOUS AS NEEDED
Status: DISCONTINUED | OUTPATIENT
Start: 2021-06-12 | End: 2021-06-12 | Stop reason: SURG

## 2021-06-12 RX ORDER — MIDAZOLAM HYDROCHLORIDE 1 MG/ML
INJECTION INTRAMUSCULAR; INTRAVENOUS AS NEEDED
Status: DISCONTINUED | OUTPATIENT
Start: 2021-06-12 | End: 2021-06-12 | Stop reason: SURG

## 2021-06-12 RX ORDER — MORPHINE SULFATE 4 MG/ML
2 INJECTION, SOLUTION INTRAMUSCULAR; INTRAVENOUS EVERY 10 MIN PRN
Status: DISCONTINUED | OUTPATIENT
Start: 2021-06-12 | End: 2021-06-12

## 2021-06-12 RX ORDER — HALOPERIDOL 5 MG/ML
0.25 INJECTION INTRAMUSCULAR ONCE AS NEEDED
Status: DISCONTINUED | OUTPATIENT
Start: 2021-06-12 | End: 2021-06-12

## 2021-06-12 RX ORDER — MORPHINE SULFATE 4 MG/ML
4 INJECTION, SOLUTION INTRAMUSCULAR; INTRAVENOUS EVERY 10 MIN PRN
Status: DISCONTINUED | OUTPATIENT
Start: 2021-06-12 | End: 2021-06-12

## 2021-06-12 RX ORDER — NEOSTIGMINE METHYLSULFATE 1 MG/ML
INJECTION INTRAVENOUS AS NEEDED
Status: DISCONTINUED | OUTPATIENT
Start: 2021-06-12 | End: 2021-06-12 | Stop reason: SURG

## 2021-06-12 RX ADMIN — CEFAZOLIN SODIUM/WATER 2 G: 2 G/20 ML SYRINGE (ML) INTRAVENOUS at 08:57:00

## 2021-06-12 RX ADMIN — MIDAZOLAM HYDROCHLORIDE 2 MG: 1 INJECTION INTRAMUSCULAR; INTRAVENOUS at 08:43:00

## 2021-06-12 RX ADMIN — GLYCOPYRROLATE 0.8 MG: 0.2 INJECTION, SOLUTION INTRAMUSCULAR; INTRAVENOUS at 09:40:00

## 2021-06-12 RX ADMIN — ROCURONIUM BROMIDE 50 MG: 10 INJECTION, SOLUTION INTRAVENOUS at 08:50:00

## 2021-06-12 RX ADMIN — NEOSTIGMINE METHYLSULFATE 4.5 MG: 1 INJECTION INTRAVENOUS at 09:40:00

## 2021-06-12 RX ADMIN — SODIUM CHLORIDE, SODIUM LACTATE, POTASSIUM CHLORIDE, CALCIUM CHLORIDE: 600; 310; 30; 20 INJECTION, SOLUTION INTRAVENOUS at 08:23:00

## 2021-06-12 RX ADMIN — DEXAMETHASONE SODIUM PHOSPHATE 4 MG: 4 MG/ML VIAL (ML) INJECTION at 08:57:00

## 2021-06-12 RX ADMIN — ONDANSETRON 4 MG: 2 INJECTION INTRAMUSCULAR; INTRAVENOUS at 08:57:00

## 2021-06-12 NOTE — ANESTHESIA PROCEDURE NOTES
Airway  Date/Time: 6/12/2021 8:53 AM  Urgency: elective    Airway not difficult    General Information and Staff    Patient location during procedure: OR  Anesthesiologist: Amina Medina MD  Performed: anesthesiologist     Indications and Patient Condition

## 2021-06-12 NOTE — BRIEF OP NOTE
Pre-Operative Diagnosis: chronic cholecystitis with cholelithiasis, biliary sludge     Post-Operative Diagnosis: chronic cholecystitis with cholelithiasis, biliary sludge      Procedure Performed:   laparoscopic cholecystectomy     Surgeon(s) and Role:

## 2021-06-12 NOTE — ANESTHESIA POSTPROCEDURE EVALUATION
Patient: Meg Estevez II    Procedure Summary     Date: 06/12/21 Room / Location: [address] [place] [address] MAIN OR 05 / [address] [place] [address] MAIN OR    Anesthesia Start: 6318 Anesthesia Stop:     Procedure: laparoscopic cholecystectomy (N/A ) Diagnosis: (chronic cholecystitis with cholelith

## 2021-06-12 NOTE — H&P
[address] [place] [address] Patient Status:  [facility]    1975 MRN [id number]   Location [facility] Attending Davida Reeves MD   Hosp Day # 0 PCP Erik Pastor daily as needed for Anxiety. diphenoxylate-atropine 2.5-0.025 MG Oral Tab, Take 1 tablet by mouth 3 (three) times daily as needed for Diarrhea. Rizatriptan Benzoate 10 MG Oral Tab, Take 1 tablet (10 mg total) by mouth as needed for Migraine.         Revie

## 2021-06-12 NOTE — ANESTHESIA PREPROCEDURE EVALUATION
Anesthesia PreOp Note    HPI:     Fredrick Pyle is a 55year old male who presents for preoperative consultation requested by: Stephanie Euceda MD    Date of Surgery: 6/12/2021    Procedure(s):  laparoscopic possible open cholecystectomy with po Anxiety. , Disp: 60 tablet, Rfl: 0, 6/12/2021 at 0330  diphenoxylate-atropine 2.5-0.025 MG Oral Tab, Take 1 tablet by mouth 3 (three) times daily as needed for Diarrhea., Disp: 30 tablet, Rfl: 1, More than a month at Unknown time  Rizatriptan Benzoate 10 MG Transportation (Medical):       Lack of Transportation (Non-Medical):   Physical Activity:       Days of Exercise per Week:       Minutes of Exercise per Session:   Stress:       Feeling of Stress :   Social Connections:       Frequency of Communication wi informed Holley Pascual II and/or legal guardian or family member of the nature of the anesthetic plan, benefits, risks including possible dental damage if relevant, major complications, and any alternative forms of anesthetic management.    All of the pat

## 2021-06-13 NOTE — OPERATIVE REPORT
[facility]    PATIENT'S NAME: Ca Cooper II   ATTENDING PHYSICIAN: Mary Barrera. MD Chris   OPERATING PHYSICIAN: Mary Barrera.  Janice Morrell MD   PATIENT ACCOUNT#:   [id number]    LOCATION:  [address]  MEDICAL RECORD #:   [id number] from this. The patient was placed in a head-up, right side up position. The gallbladder was grasped and elevated. There were innumerable adhesions to the greater omentum. These were carefully taken down with sharp dissection and occasional cautery.   On

## 2021-06-22 ENCOUNTER — APPOINTMENT (OUTPATIENT)
Dept: CT IMAGING | Age: 46
DRG: 872 | End: 2021-06-22
Attending: GENERAL ACUTE CARE HOSPITAL

## 2021-06-22 ENCOUNTER — HOSPITAL ENCOUNTER (INPATIENT)
Age: 46
LOS: 3 days | Discharge: HOME | DRG: 872 | End: 2021-06-25
Attending: GENERAL ACUTE CARE HOSPITAL | Admitting: FAMILY MEDICINE

## 2021-06-22 DIAGNOSIS — R10.9 INTRACTABLE ABDOMINAL PAIN: Primary | ICD-10-CM

## 2021-06-22 DIAGNOSIS — R10.9 ABDOMINAL PAIN, UNSPECIFIED ABDOMINAL LOCATION: ICD-10-CM

## 2021-06-22 PROBLEM — F32.A ANXIETY AND DEPRESSION: Status: ACTIVE | Noted: 2020-07-02

## 2021-06-22 PROBLEM — N50.89 TESTICULAR MASS: Status: ACTIVE | Noted: 2021-06-22

## 2021-06-22 PROBLEM — M54.9 CHRONIC BACK PAIN: Status: ACTIVE | Noted: 2021-06-22

## 2021-06-22 PROBLEM — K21.9 GASTROESOPHAGEAL REFLUX DISEASE WITHOUT ESOPHAGITIS: Status: ACTIVE | Noted: 2021-06-22

## 2021-06-22 PROBLEM — F41.9 ANXIETY: Status: ACTIVE | Noted: 2020-07-02

## 2021-06-22 PROBLEM — D72.829 LEUKOCYTOSIS: Status: ACTIVE | Noted: 2021-06-22

## 2021-06-22 PROBLEM — G43.909 MIGRAINE: Status: ACTIVE | Noted: 2020-07-02

## 2021-06-22 PROBLEM — E55.9 VITAMIN D DEFICIENCY: Status: ACTIVE | Noted: 2019-07-10

## 2021-06-22 PROBLEM — K76.0 FATTY LIVER: Status: ACTIVE | Noted: 2021-06-22

## 2021-06-22 PROBLEM — Z90.49 STATUS POST CHOLECYSTECTOMY: Status: ACTIVE | Noted: 2021-06-22

## 2021-06-22 PROBLEM — G89.29 CHRONIC BACK PAIN: Status: ACTIVE | Noted: 2021-06-22

## 2021-06-22 PROBLEM — Z79.891 LONG TERM CURRENT USE OF OPIATE ANALGESIC: Status: ACTIVE | Noted: 2021-06-22

## 2021-06-22 LAB
ALBUMIN SERPL-MCNC: 3.7 G/DL (ref 3.6–5.1)
ALBUMIN SERPL-MCNC: 3.9 G/DL (ref 3.6–5.1)
ALBUMIN/GLOB SERPL: 1.1 {RATIO} (ref 1–2.4)
ALBUMIN/GLOB SERPL: 1.2 {RATIO} (ref 1–2.4)
ALP SERPL-CCNC: 106 UNITS/L (ref 45–117)
ALP SERPL-CCNC: 92 UNITS/L (ref 45–117)
ALT SERPL-CCNC: 145 UNITS/L
ALT SERPL-CCNC: 94 UNITS/L
ANION GAP SERPL CALC-SCNC: 13 MMOL/L (ref 10–20)
ANION GAP SERPL CALC-SCNC: 13 MMOL/L (ref 10–20)
APPEARANCE UR: CLEAR
AST SERPL-CCNC: 33 UNITS/L
AST SERPL-CCNC: 54 UNITS/L
BASOPHILS # BLD: 0 K/MCL (ref 0–0.3)
BASOPHILS # BLD: 0.1 K/MCL (ref 0–0.3)
BASOPHILS NFR BLD: 0 %
BASOPHILS NFR BLD: 1 %
BILIRUB SERPL-MCNC: 0.5 MG/DL (ref 0.2–1)
BILIRUB SERPL-MCNC: 0.8 MG/DL (ref 0.2–1)
BILIRUB UR QL STRIP: NEGATIVE
BUN SERPL-MCNC: 10 MG/DL (ref 6–20)
BUN SERPL-MCNC: 14 MG/DL (ref 6–20)
BUN/CREAT SERPL: 13 (ref 7–25)
BUN/CREAT SERPL: 16 (ref 7–25)
CALCIUM SERPL-MCNC: 9.1 MG/DL (ref 8.4–10.2)
CALCIUM SERPL-MCNC: 9.4 MG/DL (ref 8.4–10.2)
CHLORIDE SERPL-SCNC: 102 MMOL/L (ref 98–107)
CHLORIDE SERPL-SCNC: 103 MMOL/L (ref 98–107)
CO2 SERPL-SCNC: 27 MMOL/L (ref 21–32)
CO2 SERPL-SCNC: 27 MMOL/L (ref 21–32)
COLOR UR: YELLOW
CREAT SERPL-MCNC: 0.8 MG/DL (ref 0.67–1.17)
CREAT SERPL-MCNC: 0.87 MG/DL (ref 0.67–1.17)
DEPRECATED RDW RBC: 39 FL (ref 39–50)
DEPRECATED RDW RBC: 39.7 FL (ref 39–50)
EOSINOPHIL # BLD: 0 K/MCL (ref 0–0.5)
EOSINOPHIL # BLD: 0.1 K/MCL (ref 0–0.5)
EOSINOPHIL NFR BLD: 0 %
EOSINOPHIL NFR BLD: 1 %
ERYTHROCYTE [DISTWIDTH] IN BLOOD: 12.1 % (ref 11–15)
ERYTHROCYTE [DISTWIDTH] IN BLOOD: 12.2 % (ref 11–15)
FASTING DURATION TIME PATIENT: ABNORMAL H
FASTING DURATION TIME PATIENT: ABNORMAL H
GFR SERPLBLD BASED ON 1.73 SQ M-ARVRAT: >90 ML/MIN/1.73M2
GFR SERPLBLD BASED ON 1.73 SQ M-ARVRAT: >90 ML/MIN/1.73M2
GLOBULIN SER-MCNC: 3.1 G/DL (ref 2–4)
GLOBULIN SER-MCNC: 3.6 G/DL (ref 2–4)
GLUCOSE SERPL-MCNC: 134 MG/DL (ref 65–99)
GLUCOSE SERPL-MCNC: 137 MG/DL (ref 65–99)
GLUCOSE UR STRIP-MCNC: 100 MG/DL
HCT VFR BLD CALC: 40.3 % (ref 39–51)
HCT VFR BLD CALC: 41 % (ref 39–51)
HGB BLD-MCNC: 13.3 G/DL (ref 13–17)
HGB BLD-MCNC: 14 G/DL (ref 13–17)
HGB UR QL STRIP: NEGATIVE
IMM GRANULOCYTES # BLD AUTO: 0.1 K/MCL (ref 0–0.2)
IMM GRANULOCYTES # BLD AUTO: 0.1 K/MCL (ref 0–0.2)
IMM GRANULOCYTES # BLD: 0 %
IMM GRANULOCYTES # BLD: 1 %
KETONES UR STRIP-MCNC: NEGATIVE MG/DL
LACTATE BLDV-SCNC: 1.2 MMOL/L (ref 0–2)
LEUKOCYTE ESTERASE UR QL STRIP: NEGATIVE
LIPASE SERPL-CCNC: 67 UNITS/L (ref 73–393)
LYMPHOCYTES # BLD: 1 K/MCL (ref 1–4.8)
LYMPHOCYTES # BLD: 2.3 K/MCL (ref 1–4.8)
LYMPHOCYTES NFR BLD: 14 %
LYMPHOCYTES NFR BLD: 6 %
MCH RBC QN AUTO: 29.8 PG (ref 26–34)
MCH RBC QN AUTO: 30.1 PG (ref 26–34)
MCHC RBC AUTO-ENTMCNC: 33 G/DL (ref 32–36.5)
MCHC RBC AUTO-ENTMCNC: 34.1 G/DL (ref 32–36.5)
MCV RBC AUTO: 88.2 FL (ref 78–100)
MCV RBC AUTO: 90.2 FL (ref 78–100)
MONOCYTES # BLD: 1.3 K/MCL (ref 0.3–0.9)
MONOCYTES # BLD: 1.4 K/MCL (ref 0.3–0.9)
MONOCYTES NFR BLD: 7 %
MONOCYTES NFR BLD: 9 %
NEUTROPHILS # BLD: 12.1 K/MCL (ref 1.8–7.7)
NEUTROPHILS # BLD: 15.1 K/MCL (ref 1.8–7.7)
NEUTROPHILS NFR BLD: 75 %
NEUTROPHILS NFR BLD: 86 %
NITRITE UR QL STRIP: NEGATIVE
NRBC BLD MANUAL-RTO: 0 /100 WBC
NRBC BLD MANUAL-RTO: 0 /100 WBC
P AXIS (DEGREES): 74
PH UR STRIP: 6.5 [PH] (ref 5–7)
PLATELET # BLD AUTO: 248 K/MCL (ref 140–450)
PLATELET # BLD AUTO: 273 K/MCL (ref 140–450)
POTASSIUM SERPL-SCNC: 3.9 MMOL/L (ref 3.4–5.1)
POTASSIUM SERPL-SCNC: 4 MMOL/L (ref 3.4–5.1)
PR-INTERVAL (MSEC): 156
PROCALCITONIN SERPL IA-MCNC: 0.82 NG/ML
PROCALCITONIN SERPL IA-MCNC: <0.05 NG/ML
PROT SERPL-MCNC: 6.8 G/DL (ref 6.4–8.2)
PROT SERPL-MCNC: 7.5 G/DL (ref 6.4–8.2)
PROT UR STRIP-MCNC: NEGATIVE MG/DL
QRS-INTERVAL (MSEC): 99
QT-INTERVAL (MSEC): 431
QTC: 406
R AXIS (DEGREES): 74
RBC # BLD: 4.47 MIL/MCL (ref 4.5–5.9)
RBC # BLD: 4.65 MIL/MCL (ref 4.5–5.9)
REPORT TEXT: NORMAL
SODIUM SERPL-SCNC: 138 MMOL/L (ref 135–145)
SODIUM SERPL-SCNC: 139 MMOL/L (ref 135–145)
SP GR UR STRIP: 1.01 (ref 1–1.03)
T AXIS (DEGREES): 67
TROPONIN I SERPL HS-MCNC: <0.02 NG/ML
UROBILINOGEN UR STRIP-MCNC: 0.2 MG/DL
VENTRICULAR RATE EKG/MIN (BPM): 50
WBC # BLD: 16 K/MCL (ref 4.2–11)
WBC # BLD: 17.5 K/MCL (ref 4.2–11)

## 2021-06-22 PROCEDURE — 87040 BLOOD CULTURE FOR BACTERIA: CPT | Performed by: FAMILY MEDICINE

## 2021-06-22 PROCEDURE — 80053 COMPREHEN METABOLIC PANEL: CPT | Performed by: FAMILY MEDICINE

## 2021-06-22 PROCEDURE — G0378 HOSPITAL OBSERVATION PER HR: HCPCS

## 2021-06-22 PROCEDURE — 10002807 HB RX 258: Performed by: GENERAL ACUTE CARE HOSPITAL

## 2021-06-22 PROCEDURE — 83690 ASSAY OF LIPASE: CPT | Performed by: PHYSICIAN ASSISTANT

## 2021-06-22 PROCEDURE — 93005 ELECTROCARDIOGRAM TRACING: CPT | Performed by: GENERAL ACUTE CARE HOSPITAL

## 2021-06-22 PROCEDURE — 10002801 HB RX 250 W/O HCPCS: Performed by: FAMILY MEDICINE

## 2021-06-22 PROCEDURE — 10006031 HB ROOM CHARGE TELEMETRY

## 2021-06-22 PROCEDURE — 93010 ELECTROCARDIOGRAM REPORT: CPT | Performed by: INTERNAL MEDICINE

## 2021-06-22 PROCEDURE — 10002800 HB RX 250 W HCPCS: Performed by: GENERAL ACUTE CARE HOSPITAL

## 2021-06-22 PROCEDURE — 10002800 HB RX 250 W HCPCS: Performed by: FAMILY MEDICINE

## 2021-06-22 PROCEDURE — 84145 PROCALCITONIN (PCT): CPT | Performed by: FAMILY MEDICINE

## 2021-06-22 PROCEDURE — 81003 URINALYSIS AUTO W/O SCOPE: CPT | Performed by: GENERAL ACUTE CARE HOSPITAL

## 2021-06-22 PROCEDURE — 10002800 HB RX 250 W HCPCS: Performed by: HOSPITALIST

## 2021-06-22 PROCEDURE — 96374 THER/PROPH/DIAG INJ IV PUSH: CPT

## 2021-06-22 PROCEDURE — 96366 THER/PROPH/DIAG IV INF ADDON: CPT

## 2021-06-22 PROCEDURE — 36415 COLL VENOUS BLD VENIPUNCTURE: CPT | Performed by: FAMILY MEDICINE

## 2021-06-22 PROCEDURE — 96375 TX/PRO/DX INJ NEW DRUG ADDON: CPT

## 2021-06-22 PROCEDURE — C9113 INJ PANTOPRAZOLE SODIUM, VIA: HCPCS | Performed by: HOSPITALIST

## 2021-06-22 PROCEDURE — 85025 COMPLETE CBC W/AUTO DIFF WBC: CPT | Performed by: PHYSICIAN ASSISTANT

## 2021-06-22 PROCEDURE — 96376 TX/PRO/DX INJ SAME DRUG ADON: CPT

## 2021-06-22 PROCEDURE — 10004651 HB RX, NO CHARGE ITEM

## 2021-06-22 PROCEDURE — 99220 INITIAL OBSERVATION CARE,LEVL III: CPT | Performed by: FAMILY MEDICINE

## 2021-06-22 PROCEDURE — 10002807 HB RX 258: Performed by: FAMILY MEDICINE

## 2021-06-22 PROCEDURE — G1004 CDSM NDSC: HCPCS

## 2021-06-22 PROCEDURE — 84484 ASSAY OF TROPONIN QUANT: CPT | Performed by: GENERAL ACUTE CARE HOSPITAL

## 2021-06-22 PROCEDURE — 96365 THER/PROPH/DIAG IV INF INIT: CPT

## 2021-06-22 PROCEDURE — 10002805 HB CONTRAST AGENT: Performed by: GENERAL ACUTE CARE HOSPITAL

## 2021-06-22 PROCEDURE — 99285 EMERGENCY DEPT VISIT HI MDM: CPT

## 2021-06-22 PROCEDURE — 99221 1ST HOSP IP/OBS SF/LOW 40: CPT | Performed by: SURGERY

## 2021-06-22 PROCEDURE — 96361 HYDRATE IV INFUSION ADD-ON: CPT

## 2021-06-22 PROCEDURE — 80053 COMPREHEN METABOLIC PANEL: CPT | Performed by: PHYSICIAN ASSISTANT

## 2021-06-22 PROCEDURE — 10002803 HB RX 637: Performed by: NURSE PRACTITIONER

## 2021-06-22 PROCEDURE — 10004651 HB RX, NO CHARGE ITEM: Performed by: FAMILY MEDICINE

## 2021-06-22 PROCEDURE — 85025 COMPLETE CBC W/AUTO DIFF WBC: CPT | Performed by: FAMILY MEDICINE

## 2021-06-22 PROCEDURE — 83605 ASSAY OF LACTIC ACID: CPT | Performed by: FAMILY MEDICINE

## 2021-06-22 PROCEDURE — 74177 CT ABD & PELVIS W/CONTRAST: CPT

## 2021-06-22 RX ORDER — ACETAMINOPHEN 325 MG/1
TABLET ORAL
Status: COMPLETED
Start: 2021-06-22 | End: 2021-06-22

## 2021-06-22 RX ORDER — RIZATRIPTAN BENZOATE 10 MG/1
10 TABLET ORAL PRN
COMMUNITY

## 2021-06-22 RX ORDER — PANTOPRAZOLE SODIUM 40 MG/10ML
40 INJECTION, POWDER, LYOPHILIZED, FOR SOLUTION INTRAVENOUS ONCE
Status: COMPLETED | OUTPATIENT
Start: 2021-06-22 | End: 2021-06-22

## 2021-06-22 RX ORDER — SODIUM CHLORIDE 9 MG/ML
INJECTION, SOLUTION INTRAVENOUS CONTINUOUS
Status: DISCONTINUED | OUTPATIENT
Start: 2021-06-22 | End: 2021-06-25

## 2021-06-22 RX ORDER — POTASSIUM CHLORIDE 14.9 MG/ML
20 INJECTION INTRAVENOUS ONCE
Status: COMPLETED | OUTPATIENT
Start: 2021-06-22 | End: 2021-06-22

## 2021-06-22 RX ORDER — ALPRAZOLAM 0.25 MG/1
0.5 TABLET ORAL NIGHTLY PRN
Status: DISCONTINUED | OUTPATIENT
Start: 2021-06-22 | End: 2021-06-25 | Stop reason: HOSPADM

## 2021-06-22 RX ORDER — ONDANSETRON 2 MG/ML
4 INJECTION INTRAMUSCULAR; INTRAVENOUS ONCE
Status: COMPLETED | OUTPATIENT
Start: 2021-06-22 | End: 2021-06-22

## 2021-06-22 RX ORDER — METOCLOPRAMIDE HYDROCHLORIDE 5 MG/ML
10 INJECTION INTRAMUSCULAR; INTRAVENOUS ONCE
Status: COMPLETED | OUTPATIENT
Start: 2021-06-22 | End: 2021-06-22

## 2021-06-22 RX ORDER — ACETAMINOPHEN 325 MG/1
650 TABLET ORAL EVERY 4 HOURS PRN
Status: DISCONTINUED | OUTPATIENT
Start: 2021-06-22 | End: 2021-06-25 | Stop reason: HOSPADM

## 2021-06-22 RX ORDER — MONTELUKAST SODIUM 10 MG/1
10 TABLET ORAL NIGHTLY
Status: DISCONTINUED | OUTPATIENT
Start: 2021-06-22 | End: 2021-06-25 | Stop reason: HOSPADM

## 2021-06-22 RX ORDER — POLYETHYLENE GLYCOL 3350 17 G/17G
17 POWDER, FOR SOLUTION ORAL 2 TIMES DAILY
Status: DISCONTINUED | OUTPATIENT
Start: 2021-06-22 | End: 2021-06-25

## 2021-06-22 RX ORDER — 0.9 % SODIUM CHLORIDE 0.9 %
2 VIAL (ML) INJECTION EVERY 12 HOURS SCHEDULED
Status: DISCONTINUED | OUTPATIENT
Start: 2021-06-22 | End: 2021-06-25 | Stop reason: HOSPADM

## 2021-06-22 RX ORDER — ONDANSETRON 2 MG/ML
4 INJECTION INTRAMUSCULAR; INTRAVENOUS EVERY 8 HOURS PRN
Status: DISCONTINUED | OUTPATIENT
Start: 2021-06-22 | End: 2021-06-24

## 2021-06-22 RX ADMIN — FENTANYL CITRATE 25 MCG: 50 INJECTION INTRAMUSCULAR; INTRAVENOUS at 08:25

## 2021-06-22 RX ADMIN — SODIUM CHLORIDE: 9 INJECTION, SOLUTION INTRAVENOUS at 10:29

## 2021-06-22 RX ADMIN — ACETAMINOPHEN 650 MG: 325 TABLET ORAL at 22:19

## 2021-06-22 RX ADMIN — ONDANSETRON 4 MG: 2 INJECTION INTRAMUSCULAR; INTRAVENOUS at 06:03

## 2021-06-22 RX ADMIN — ACETAMINOPHEN 650 MG: 325 TABLET ORAL at 18:02

## 2021-06-22 RX ADMIN — PANTOPRAZOLE SODIUM 40 MG: 40 INJECTION, POWDER, FOR SOLUTION INTRAVENOUS at 23:27

## 2021-06-22 RX ADMIN — MORPHINE SULFATE 2 MG: 4 INJECTION INTRAVENOUS at 10:51

## 2021-06-22 RX ADMIN — IOHEXOL 75 ML: 350 INJECTION, SOLUTION INTRAVENOUS at 07:00

## 2021-06-22 RX ADMIN — KETOROLAC TROMETHAMINE 15 MG: 15 INJECTION, SOLUTION INTRAMUSCULAR; INTRAVENOUS at 23:27

## 2021-06-22 RX ADMIN — POTASSIUM CHLORIDE 20 MEQ: 14.9 INJECTION, SOLUTION INTRAVENOUS at 16:50

## 2021-06-22 RX ADMIN — MORPHINE SULFATE 4 MG: 10 INJECTION INTRAVENOUS at 06:04

## 2021-06-22 RX ADMIN — SODIUM CHLORIDE, PRESERVATIVE FREE 2 ML: 5 INJECTION INTRAVENOUS at 21:33

## 2021-06-22 RX ADMIN — MORPHINE SULFATE 2 MG: 4 INJECTION INTRAVENOUS at 21:27

## 2021-06-22 RX ADMIN — ONDANSETRON 4 MG: 2 INJECTION INTRAMUSCULAR; INTRAVENOUS at 07:50

## 2021-06-22 RX ADMIN — MORPHINE SULFATE 2 MG: 2 INJECTION, SOLUTION INTRAMUSCULAR; INTRAVENOUS at 06:40

## 2021-06-22 RX ADMIN — MORPHINE SULFATE 2 MG: 4 INJECTION INTRAVENOUS at 19:13

## 2021-06-22 RX ADMIN — FENTANYL CITRATE 25 MCG: 50 INJECTION INTRAMUSCULAR; INTRAVENOUS at 07:24

## 2021-06-22 RX ADMIN — SODIUM CHLORIDE 1000 ML: 0.9 INJECTION, SOLUTION INTRAVENOUS at 06:11

## 2021-06-22 RX ADMIN — POTASSIUM CHLORIDE 20 MEQ: 14.9 INJECTION, SOLUTION INTRAVENOUS at 13:01

## 2021-06-22 RX ADMIN — PIPERACILLIN SODIUM AND TAZOBACTAM SODIUM 3.38 G: 3; .375 INJECTION, POWDER, FOR SOLUTION INTRAVENOUS at 21:31

## 2021-06-22 RX ADMIN — MORPHINE SULFATE 2 MG: 4 INJECTION INTRAVENOUS at 14:35

## 2021-06-22 RX ADMIN — METOCLOPRAMIDE HYDROCHLORIDE 10 MG: 5 INJECTION INTRAMUSCULAR; INTRAVENOUS at 10:44

## 2021-06-22 RX ADMIN — POLYETHYLENE GLYCOL 3350 17 G: 17 POWDER, FOR SOLUTION ORAL at 13:03

## 2021-06-22 RX ADMIN — MONTELUKAST SODIUM 10 MG: 10 TABLET, FILM COATED ORAL at 21:29

## 2021-06-22 ASSESSMENT — ENCOUNTER SYMPTOMS
APPETITE CHANGE: 0
EYE DISCHARGE: 0
UNEXPECTED WEIGHT CHANGE: 0
ACTIVITY CHANGE: 0
BRUISES/BLEEDS EASILY: 0
ABDOMINAL DISTENTION: 0
CONFUSION: 0
COUGH: 0
ABDOMINAL PAIN: 1
VOMITING: 0
VOICE CHANGE: 0
BACK PAIN: 0
SLEEP DISTURBANCE: 0
FATIGUE: 0
RHINORRHEA: 0
CHEST TIGHTNESS: 0
DIARRHEA: 0
WHEEZING: 0
SHORTNESS OF BREATH: 0
COLOR CHANGE: 0
ADENOPATHY: 0
CONSTIPATION: 0
EYE REDNESS: 0
STRIDOR: 0
BLOOD IN STOOL: 0
HEADACHES: 0
CHOKING: 0
AGITATION: 0
SORE THROAT: 0
TROUBLE SWALLOWING: 0
DIZZINESS: 0
FEVER: 0
NAUSEA: 1

## 2021-06-22 ASSESSMENT — ACTIVITIES OF DAILY LIVING (ADL)
RECENT_DECLINE_ADL: NO
ADL_BEFORE_ADMISSION: INDEPENDENT
ADL_SHORT_OF_BREATH: NO
ADL_BEFORE_ADMISSION: INDEPENDENT
ADL_SCORE: 12
CHRONIC_PAIN_PRESENT: NO
ADL_SHORT_OF_BREATH: NO
ADL_SCORE: 12

## 2021-06-22 ASSESSMENT — LIFESTYLE VARIABLES
ALCOHOL_USE_STATUS: NO OR LOW RISK WITH VALIDATED TOOL
AUDIT-C TOTAL SCORE: 3
HOW OFTEN DO YOU HAVE A DRINK CONTAINING ALCOHOL: NEVER
HOW OFTEN DO YOU HAVE A DRINK CONTAINING ALCOHOL: MONTHLY OR LESS
ALCOHOL_USE_STATUS: NO OR LOW RISK WITH VALIDATED TOOL
HOW MANY STANDARD DRINKS CONTAINING ALCOHOL DO YOU HAVE ON A TYPICAL DAY: 3 OR 4
HOW OFTEN DO YOU HAVE 6 OR MORE DRINKS ON ONE OCCASION: LESS THAN MONTHLY
AUDIT-C TOTAL SCORE: 0
HOW OFTEN DO YOU HAVE 6 OR MORE DRINKS ON ONE OCCASION: NEVER
HOW MANY STANDARD DRINKS CONTAINING ALCOHOL DO YOU HAVE ON A TYPICAL DAY: 0,1 OR 2

## 2021-06-22 ASSESSMENT — PAIN SCALES - GENERAL
PAINLEVEL_OUTOF10: 10
PAINLEVEL_OUTOF10: 8
PAINLEVEL_OUTOF10: 10
PAINLEVEL_OUTOF10: 8
PAINLEVEL_OUTOF10: 8
PAINLEVEL_OUTOF10: 4
PAINLEVEL_OUTOF10: 10

## 2021-06-22 ASSESSMENT — PAIN SCALES - WONG BAKER
WONGBAKER_NUMERICALRESPONSE: 10
WONGBAKER_NUMERICALRESPONSE: 10
WONGBAKER_NUMERICALRESPONSE: 4

## 2021-06-22 ASSESSMENT — COGNITIVE AND FUNCTIONAL STATUS - GENERAL
ARE YOU BLIND OR DO YOU HAVE SERIOUS DIFFICULTY SEEING, EVEN WHEN WEARING GLASSES: NO
DO YOU HAVE DIFFICULTY DRESSING OR BATHING: NO
BECAUSE OF A PHYSICAL, MENTAL, OR EMOTIONAL CONDITION, DO YOU HAVE DIFFICULTY DOING ERRANDS ALONE: NO
ARE YOU DEAF OR DO YOU HAVE SERIOUS DIFFICULTY  HEARING: NO
BECAUSE OF A PHYSICAL, MENTAL, OR EMOTIONAL CONDITION, DO YOU HAVE SERIOUS DIFFICULTY CONCENTRATING, REMEMBERING OR MAKING DECISIONS: NO
DO YOU HAVE SERIOUS DIFFICULTY WALKING OR CLIMBING STAIRS: NO

## 2021-06-23 ENCOUNTER — APPOINTMENT (OUTPATIENT)
Dept: MRI IMAGING | Age: 46
DRG: 872 | End: 2021-06-23
Attending: SURGERY

## 2021-06-23 ENCOUNTER — APPOINTMENT (OUTPATIENT)
Dept: NUCLEAR MEDICINE | Age: 46
DRG: 872 | End: 2021-06-23
Attending: NURSE PRACTITIONER

## 2021-06-23 LAB
ALBUMIN SERPL-MCNC: 3.6 G/DL (ref 3.6–5.1)
ALBUMIN/GLOB SERPL: 1 {RATIO} (ref 1–2.4)
ALP SERPL-CCNC: 116 UNITS/L (ref 45–117)
ALT SERPL-CCNC: 176 UNITS/L
ANION GAP SERPL CALC-SCNC: 12 MMOL/L (ref 10–20)
AST SERPL-CCNC: 91 UNITS/L
BASOPHILS # BLD: 0.1 K/MCL (ref 0–0.3)
BASOPHILS NFR BLD: 0 %
BILIRUB SERPL-MCNC: 1.6 MG/DL (ref 0.2–1)
BUN SERPL-MCNC: 12 MG/DL (ref 6–20)
BUN/CREAT SERPL: 11 (ref 7–25)
CALCIUM SERPL-MCNC: 9.4 MG/DL (ref 8.4–10.2)
CHLORIDE SERPL-SCNC: 103 MMOL/L (ref 98–107)
CO2 SERPL-SCNC: 30 MMOL/L (ref 21–32)
CREAT SERPL-MCNC: 1.08 MG/DL (ref 0.67–1.17)
DEPRECATED RDW RBC: 41.2 FL (ref 39–50)
EOSINOPHIL # BLD: 0 K/MCL (ref 0–0.5)
EOSINOPHIL NFR BLD: 0 %
ERYTHROCYTE [DISTWIDTH] IN BLOOD: 12.4 % (ref 11–15)
FASTING DURATION TIME PATIENT: ABNORMAL H
GFR SERPLBLD BASED ON 1.73 SQ M-ARVRAT: 82 ML/MIN/1.73M2
GLOBULIN SER-MCNC: 3.7 G/DL (ref 2–4)
GLUCOSE SERPL-MCNC: 107 MG/DL (ref 65–99)
HCT VFR BLD CALC: 43 % (ref 39–51)
HGB BLD-MCNC: 14.2 G/DL (ref 13–17)
IMM GRANULOCYTES # BLD AUTO: 0.1 K/MCL (ref 0–0.2)
IMM GRANULOCYTES # BLD: 0 %
LYMPHOCYTES # BLD: 1.5 K/MCL (ref 1–4.8)
LYMPHOCYTES NFR BLD: 11 %
MAGNESIUM SERPL-MCNC: 2.1 MG/DL (ref 1.7–2.4)
MCH RBC QN AUTO: 30.1 PG (ref 26–34)
MCHC RBC AUTO-ENTMCNC: 33 G/DL (ref 32–36.5)
MCV RBC AUTO: 91.1 FL (ref 78–100)
MONOCYTES # BLD: 1.5 K/MCL (ref 0.3–0.9)
MONOCYTES NFR BLD: 11 %
NEUTROPHILS # BLD: 10.6 K/MCL (ref 1.8–7.7)
NEUTROPHILS NFR BLD: 78 %
NRBC BLD MANUAL-RTO: 0 /100 WBC
PHOSPHATE SERPL-MCNC: 3.3 MG/DL (ref 2.4–4.7)
PLATELET # BLD AUTO: 274 K/MCL (ref 140–450)
POTASSIUM SERPL-SCNC: 4.9 MMOL/L (ref 3.4–5.1)
PROCALCITONIN SERPL IA-MCNC: 0.96 NG/ML
PROT SERPL-MCNC: 7.3 G/DL (ref 6.4–8.2)
RAINBOW EXTRA TUBES HOLD SPECIMEN: NORMAL
RAINBOW EXTRA TUBES HOLD SPECIMEN: NORMAL
RBC # BLD: 4.72 MIL/MCL (ref 4.5–5.9)
SODIUM SERPL-SCNC: 140 MMOL/L (ref 135–145)
WBC # BLD: 13.7 K/MCL (ref 4.2–11)

## 2021-06-23 PROCEDURE — 78226 HEPATOBILIARY SYSTEM IMAGING: CPT

## 2021-06-23 PROCEDURE — G1004 CDSM NDSC: HCPCS

## 2021-06-23 PROCEDURE — 10002803 HB RX 637: Performed by: NURSE PRACTITIONER

## 2021-06-23 PROCEDURE — 78227 HEPATOBIL SYST IMAGE W/DRUG: CPT

## 2021-06-23 PROCEDURE — 99232 SBSQ HOSP IP/OBS MODERATE 35: CPT | Performed by: SURGERY

## 2021-06-23 PROCEDURE — 83735 ASSAY OF MAGNESIUM: CPT | Performed by: FAMILY MEDICINE

## 2021-06-23 PROCEDURE — 84145 PROCALCITONIN (PCT): CPT | Performed by: FAMILY MEDICINE

## 2021-06-23 PROCEDURE — A9537 TC99M MEBROFENIN: HCPCS | Performed by: NURSE PRACTITIONER

## 2021-06-23 PROCEDURE — 10006150 HB RX 343: Performed by: NURSE PRACTITIONER

## 2021-06-23 PROCEDURE — 84100 ASSAY OF PHOSPHORUS: CPT | Performed by: FAMILY MEDICINE

## 2021-06-23 PROCEDURE — 10002807 HB RX 258: Performed by: FAMILY MEDICINE

## 2021-06-23 PROCEDURE — 10006031 HB ROOM CHARGE TELEMETRY

## 2021-06-23 PROCEDURE — 85025 COMPLETE CBC W/AUTO DIFF WBC: CPT | Performed by: FAMILY MEDICINE

## 2021-06-23 PROCEDURE — 99233 SBSQ HOSP IP/OBS HIGH 50: CPT | Performed by: FAMILY MEDICINE

## 2021-06-23 PROCEDURE — 10002801 HB RX 250 W/O HCPCS: Performed by: FAMILY MEDICINE

## 2021-06-23 PROCEDURE — 74181 MRI ABDOMEN W/O CONTRAST: CPT

## 2021-06-23 PROCEDURE — 10004651 HB RX, NO CHARGE ITEM: Performed by: FAMILY MEDICINE

## 2021-06-23 PROCEDURE — 10002800 HB RX 250 W HCPCS: Performed by: INTERNAL MEDICINE

## 2021-06-23 PROCEDURE — 10002800 HB RX 250 W HCPCS: Performed by: FAMILY MEDICINE

## 2021-06-23 PROCEDURE — 36415 COLL VENOUS BLD VENIPUNCTURE: CPT | Performed by: FAMILY MEDICINE

## 2021-06-23 PROCEDURE — 80053 COMPREHEN METABOLIC PANEL: CPT | Performed by: FAMILY MEDICINE

## 2021-06-23 RX ORDER — KIT FOR THE PREPARATION OF TECHNETIUM TC 99M MEBROFENIN 45 MG/10ML
5.3 INJECTION, POWDER, LYOPHILIZED, FOR SOLUTION INTRAVENOUS ONCE
Status: COMPLETED | OUTPATIENT
Start: 2021-06-23 | End: 2021-06-23

## 2021-06-23 RX ADMIN — PIPERACILLIN SODIUM AND TAZOBACTAM SODIUM 3.38 G: 3; .375 INJECTION, POWDER, FOR SOLUTION INTRAVENOUS at 05:10

## 2021-06-23 RX ADMIN — ACETAMINOPHEN 650 MG: 325 TABLET ORAL at 05:10

## 2021-06-23 RX ADMIN — MORPHINE SULFATE 2 MG: 4 INJECTION INTRAVENOUS at 21:43

## 2021-06-23 RX ADMIN — POLYETHYLENE GLYCOL 3350 17 G: 17 POWDER, FOR SOLUTION ORAL at 21:43

## 2021-06-23 RX ADMIN — MORPHINE SULFATE 2 MG: 4 INJECTION INTRAVENOUS at 10:46

## 2021-06-23 RX ADMIN — KETOROLAC TROMETHAMINE 15 MG: 30 INJECTION, SOLUTION INTRAMUSCULAR; INTRAVENOUS at 13:32

## 2021-06-23 RX ADMIN — KETOROLAC TROMETHAMINE 15 MG: 30 INJECTION, SOLUTION INTRAMUSCULAR; INTRAVENOUS at 06:22

## 2021-06-23 RX ADMIN — PIPERACILLIN SODIUM AND TAZOBACTAM SODIUM 3.38 G: 3; .375 INJECTION, POWDER, FOR SOLUTION INTRAVENOUS at 13:46

## 2021-06-23 RX ADMIN — MONTELUKAST SODIUM 10 MG: 10 TABLET, FILM COATED ORAL at 21:43

## 2021-06-23 RX ADMIN — PIPERACILLIN SODIUM AND TAZOBACTAM SODIUM 3.38 G: 3; .375 INJECTION, POWDER, FOR SOLUTION INTRAVENOUS at 21:52

## 2021-06-23 RX ADMIN — SODIUM CHLORIDE: 9 INJECTION, SOLUTION INTRAVENOUS at 13:44

## 2021-06-23 RX ADMIN — MORPHINE SULFATE 2 MG: 4 INJECTION INTRAVENOUS at 15:47

## 2021-06-23 RX ADMIN — MORPHINE SULFATE 2 MG: 4 INJECTION INTRAVENOUS at 17:41

## 2021-06-23 RX ADMIN — MORPHINE SULFATE 2 MG: 4 INJECTION INTRAVENOUS at 03:30

## 2021-06-23 RX ADMIN — MEBROFENIN 5.3 MILLICURIE: 45 INJECTION, POWDER, LYOPHILIZED, FOR SOLUTION INTRAVENOUS at 10:31

## 2021-06-23 RX ADMIN — KETOROLAC TROMETHAMINE 15 MG: 30 INJECTION, SOLUTION INTRAMUSCULAR; INTRAVENOUS at 20:00

## 2021-06-23 RX ADMIN — MORPHINE SULFATE 2 MG: 4 INJECTION INTRAVENOUS at 01:22

## 2021-06-23 RX ADMIN — MORPHINE SULFATE 2 MG: 4 INJECTION INTRAVENOUS at 13:32

## 2021-06-23 RX ADMIN — ACETAMINOPHEN 650 MG: 325 TABLET ORAL at 17:31

## 2021-06-23 RX ADMIN — SODIUM CHLORIDE, PRESERVATIVE FREE 2 ML: 5 INJECTION INTRAVENOUS at 09:49

## 2021-06-23 ASSESSMENT — PAIN SCALES - GENERAL
PAINLEVEL_OUTOF10: 7
PAINLEVEL_OUTOF10: 8
PAINLEVEL_OUTOF10: 4
PAINLEVEL_OUTOF10: 7
PAINLEVEL_OUTOF10: 8
PAINLEVEL_OUTOF10: 10
PAINLEVEL_OUTOF10: 8
PAINLEVEL_OUTOF10: 7
PAINLEVEL_OUTOF10: 7
PAINLEVEL_OUTOF10: 6
PAINLEVEL_OUTOF10: 8
PAINLEVEL_OUTOF10: 7
PAINLEVEL_OUTOF10: 7

## 2021-06-23 ASSESSMENT — PAIN SCALES - WONG BAKER
WONGBAKER_NUMERICALRESPONSE: 8
WONGBAKER_NUMERICALRESPONSE: 6

## 2021-06-23 ASSESSMENT — PAIN DESCRIPTION - PAIN TYPE: TYPE: ACUTE PAIN

## 2021-06-24 ENCOUNTER — ANESTHESIA EVENT (OUTPATIENT)
Dept: GASTROENTEROLOGY | Age: 46
DRG: 872 | End: 2021-06-24

## 2021-06-24 ENCOUNTER — APPOINTMENT (OUTPATIENT)
Dept: GASTROENTEROLOGY | Age: 46
DRG: 872 | End: 2021-06-24
Attending: INTERNAL MEDICINE

## 2021-06-24 ENCOUNTER — ANESTHESIA (OUTPATIENT)
Dept: GASTROENTEROLOGY | Age: 46
DRG: 872 | End: 2021-06-24

## 2021-06-24 LAB
ALBUMIN SERPL-MCNC: 3 G/DL (ref 3.6–5.1)
ALBUMIN/GLOB SERPL: 0.8 {RATIO} (ref 1–2.4)
ALP SERPL-CCNC: 104 UNITS/L (ref 45–117)
ALT SERPL-CCNC: 97 UNITS/L
ANION GAP SERPL CALC-SCNC: 12 MMOL/L (ref 10–20)
AST SERPL-CCNC: 24 UNITS/L
BASOPHILS # BLD: 0 K/MCL (ref 0–0.3)
BASOPHILS NFR BLD: 0 %
BILIRUB SERPL-MCNC: 1 MG/DL (ref 0.2–1)
BUN SERPL-MCNC: 15 MG/DL (ref 6–20)
BUN/CREAT SERPL: 17 (ref 7–25)
CALCIUM SERPL-MCNC: 9.1 MG/DL (ref 8.4–10.2)
CHLORIDE SERPL-SCNC: 108 MMOL/L (ref 98–107)
CO2 SERPL-SCNC: 27 MMOL/L (ref 21–32)
CREAT SERPL-MCNC: 0.87 MG/DL (ref 0.67–1.17)
DEPRECATED RDW RBC: 40.7 FL (ref 39–50)
EOSINOPHIL # BLD: 0 K/MCL (ref 0–0.5)
EOSINOPHIL NFR BLD: 0 %
ERYTHROCYTE [DISTWIDTH] IN BLOOD: 12.3 % (ref 11–15)
FASTING DURATION TIME PATIENT: ABNORMAL H
GFR SERPLBLD BASED ON 1.73 SQ M-ARVRAT: >90 ML/MIN/1.73M2
GLOBULIN SER-MCNC: 3.8 G/DL (ref 2–4)
GLUCOSE SERPL-MCNC: 104 MG/DL (ref 65–99)
HCT VFR BLD CALC: 36.5 % (ref 39–51)
HGB BLD-MCNC: 12 G/DL (ref 13–17)
IMM GRANULOCYTES # BLD AUTO: 0 K/MCL (ref 0–0.2)
IMM GRANULOCYTES # BLD: 0 %
INR PPP: 1
LYMPHOCYTES # BLD: 1.9 K/MCL (ref 1–4.8)
LYMPHOCYTES NFR BLD: 20 %
MAGNESIUM SERPL-MCNC: 2.1 MG/DL (ref 1.7–2.4)
MCH RBC QN AUTO: 29.7 PG (ref 26–34)
MCHC RBC AUTO-ENTMCNC: 32.9 G/DL (ref 32–36.5)
MCV RBC AUTO: 90.3 FL (ref 78–100)
MONOCYTES # BLD: 0.9 K/MCL (ref 0.3–0.9)
MONOCYTES NFR BLD: 10 %
NEUTROPHILS # BLD: 6.7 K/MCL (ref 1.8–7.7)
NEUTROPHILS NFR BLD: 70 %
NRBC BLD MANUAL-RTO: 0 /100 WBC
PHOSPHATE SERPL-MCNC: 3.3 MG/DL (ref 2.4–4.7)
PLATELET # BLD AUTO: 248 K/MCL (ref 140–450)
POTASSIUM SERPL-SCNC: 4.5 MMOL/L (ref 3.4–5.1)
PROT SERPL-MCNC: 6.8 G/DL (ref 6.4–8.2)
PROTHROMBIN TIME: 10.7 SEC (ref 9.7–11.8)
RAINBOW EXTRA TUBES HOLD SPECIMEN: NORMAL
RBC # BLD: 4.04 MIL/MCL (ref 4.5–5.9)
SODIUM SERPL-SCNC: 142 MMOL/L (ref 135–145)
WBC # BLD: 9.6 K/MCL (ref 4.2–11)

## 2021-06-24 PROCEDURE — 0DB68ZX EXCISION OF STOMACH, VIA NATURAL OR ARTIFICIAL OPENING ENDOSCOPIC, DIAGNOSTIC: ICD-10-PCS | Performed by: INTERNAL MEDICINE

## 2021-06-24 PROCEDURE — 10002807 HB RX 258: Performed by: FAMILY MEDICINE

## 2021-06-24 PROCEDURE — 84100 ASSAY OF PHOSPHORUS: CPT | Performed by: FAMILY MEDICINE

## 2021-06-24 PROCEDURE — 99232 SBSQ HOSP IP/OBS MODERATE 35: CPT | Performed by: SURGERY

## 2021-06-24 PROCEDURE — 99233 SBSQ HOSP IP/OBS HIGH 50: CPT | Performed by: FAMILY MEDICINE

## 2021-06-24 PROCEDURE — 10002801 HB RX 250 W/O HCPCS: Performed by: FAMILY MEDICINE

## 2021-06-24 PROCEDURE — 88305 TISSUE EXAM BY PATHOLOGIST: CPT | Performed by: INTERNAL MEDICINE

## 2021-06-24 PROCEDURE — 80053 COMPREHEN METABOLIC PANEL: CPT | Performed by: FAMILY MEDICINE

## 2021-06-24 PROCEDURE — 83735 ASSAY OF MAGNESIUM: CPT | Performed by: FAMILY MEDICINE

## 2021-06-24 PROCEDURE — 0DB78ZX EXCISION OF STOMACH, PYLORUS, VIA NATURAL OR ARTIFICIAL OPENING ENDOSCOPIC, DIAGNOSTIC: ICD-10-PCS | Performed by: INTERNAL MEDICINE

## 2021-06-24 PROCEDURE — 10004451 HB PACU RECOVERY 1ST 30 MINUTES

## 2021-06-24 PROCEDURE — 85025 COMPLETE CBC W/AUTO DIFF WBC: CPT | Performed by: FAMILY MEDICINE

## 2021-06-24 PROCEDURE — 10004651 HB RX, NO CHARGE ITEM: Performed by: FAMILY MEDICINE

## 2021-06-24 PROCEDURE — 10002800 HB RX 250 W HCPCS: Performed by: ANESTHESIOLOGY

## 2021-06-24 PROCEDURE — 10002807 HB RX 258: Performed by: NURSE ANESTHETIST, CERTIFIED REGISTERED

## 2021-06-24 PROCEDURE — 13000024 HB GI COMPLEX CASE S/U + 1ST 15 MIN

## 2021-06-24 PROCEDURE — 10002803 HB RX 637: Performed by: FAMILY MEDICINE

## 2021-06-24 PROCEDURE — 10002800 HB RX 250 W HCPCS: Performed by: FAMILY MEDICINE

## 2021-06-24 PROCEDURE — 0DB48ZX EXCISION OF ESOPHAGOGASTRIC JUNCTION, VIA NATURAL OR ARTIFICIAL OPENING ENDOSCOPIC, DIAGNOSTIC: ICD-10-PCS | Performed by: INTERNAL MEDICINE

## 2021-06-24 PROCEDURE — 13000008 HB ANESTHESIA MAC OUTSIDE OR

## 2021-06-24 PROCEDURE — 10002807 HB RX 258: Performed by: INTERNAL MEDICINE

## 2021-06-24 PROCEDURE — 10004452 HB PACU ADDL 30 MINUTES

## 2021-06-24 PROCEDURE — 10000002 HB ROOM CHARGE MED SURG

## 2021-06-24 PROCEDURE — 10002803 HB RX 637: Performed by: INTERNAL MEDICINE

## 2021-06-24 PROCEDURE — 36415 COLL VENOUS BLD VENIPUNCTURE: CPT | Performed by: FAMILY MEDICINE

## 2021-06-24 PROCEDURE — 10002800 HB RX 250 W HCPCS: Performed by: INTERNAL MEDICINE

## 2021-06-24 PROCEDURE — 85610 PROTHROMBIN TIME: CPT | Performed by: INTERNAL MEDICINE

## 2021-06-24 RX ORDER — ONDANSETRON 2 MG/ML
4 INJECTION INTRAMUSCULAR; INTRAVENOUS EVERY 6 HOURS PRN
Status: DISCONTINUED | OUTPATIENT
Start: 2021-06-24 | End: 2021-06-25 | Stop reason: HOSPADM

## 2021-06-24 RX ORDER — PANTOPRAZOLE SODIUM 40 MG/1
40 TABLET, DELAYED RELEASE ORAL EVERY 12 HOURS SCHEDULED
Status: DISCONTINUED | OUTPATIENT
Start: 2021-06-24 | End: 2021-06-25 | Stop reason: HOSPADM

## 2021-06-24 RX ORDER — SODIUM CHLORIDE, SODIUM LACTATE, POTASSIUM CHLORIDE, CALCIUM CHLORIDE 600; 310; 30; 20 MG/100ML; MG/100ML; MG/100ML; MG/100ML
INJECTION, SOLUTION INTRAVENOUS CONTINUOUS PRN
Status: DISCONTINUED | OUTPATIENT
Start: 2021-06-24 | End: 2021-06-24

## 2021-06-24 RX ORDER — RIZATRIPTAN BENZOATE 10 MG/1
10 TABLET, ORALLY DISINTEGRATING ORAL EVERY 8 HOURS PRN
Status: DISCONTINUED | OUTPATIENT
Start: 2021-06-24 | End: 2021-06-25 | Stop reason: HOSPADM

## 2021-06-24 RX ORDER — SODIUM CHLORIDE, SODIUM LACTATE, POTASSIUM CHLORIDE, CALCIUM CHLORIDE 600; 310; 30; 20 MG/100ML; MG/100ML; MG/100ML; MG/100ML
INJECTION, SOLUTION INTRAVENOUS CONTINUOUS
Status: DISCONTINUED | OUTPATIENT
Start: 2021-06-24 | End: 2021-06-24

## 2021-06-24 RX ORDER — PROPOFOL 10 MG/ML
INJECTION, EMULSION INTRAVENOUS PRN
Status: DISCONTINUED | OUTPATIENT
Start: 2021-06-24 | End: 2021-06-24

## 2021-06-24 RX ORDER — LORAZEPAM 2 MG/ML
0.5 INJECTION INTRAMUSCULAR EVERY 8 HOURS PRN
Status: DISCONTINUED | OUTPATIENT
Start: 2021-06-24 | End: 2021-06-25 | Stop reason: HOSPADM

## 2021-06-24 RX ADMIN — MORPHINE SULFATE 2 MG: 4 INJECTION INTRAVENOUS at 06:18

## 2021-06-24 RX ADMIN — PANTOPRAZOLE SODIUM 40 MG: 40 TABLET, DELAYED RELEASE ORAL at 20:34

## 2021-06-24 RX ADMIN — KETOROLAC TROMETHAMINE 15 MG: 30 INJECTION, SOLUTION INTRAMUSCULAR; INTRAVENOUS at 14:05

## 2021-06-24 RX ADMIN — KETOROLAC TROMETHAMINE 15 MG: 30 INJECTION, SOLUTION INTRAMUSCULAR; INTRAVENOUS at 02:07

## 2021-06-24 RX ADMIN — ALPRAZOLAM 0.5 MG: 0.25 TABLET ORAL at 20:34

## 2021-06-24 RX ADMIN — DULOXETINE HYDROCHLORIDE 90 MG: 60 CAPSULE, DELAYED RELEASE ORAL at 11:00

## 2021-06-24 RX ADMIN — MONTELUKAST SODIUM 10 MG: 10 TABLET, FILM COATED ORAL at 20:34

## 2021-06-24 RX ADMIN — PROPOFOL 75 MG: 10 INJECTION, EMULSION INTRAVENOUS at 12:33

## 2021-06-24 RX ADMIN — PIPERACILLIN SODIUM AND TAZOBACTAM SODIUM 3.38 G: 3; .375 INJECTION, POWDER, FOR SOLUTION INTRAVENOUS at 06:45

## 2021-06-24 RX ADMIN — SODIUM CHLORIDE: 9 INJECTION, SOLUTION INTRAVENOUS at 13:50

## 2021-06-24 RX ADMIN — MORPHINE SULFATE 2 MG: 4 INJECTION INTRAVENOUS at 00:02

## 2021-06-24 RX ADMIN — LORAZEPAM 0.5 MG: 2 INJECTION INTRAMUSCULAR; INTRAVENOUS at 10:14

## 2021-06-24 RX ADMIN — ONDANSETRON 4 MG: 2 INJECTION INTRAMUSCULAR; INTRAVENOUS at 00:29

## 2021-06-24 RX ADMIN — SODIUM CHLORIDE, PRESERVATIVE FREE 2 ML: 5 INJECTION INTRAVENOUS at 08:13

## 2021-06-24 RX ADMIN — PIPERACILLIN SODIUM AND TAZOBACTAM SODIUM 3.38 G: 3; .375 INJECTION, POWDER, FOR SOLUTION INTRAVENOUS at 14:23

## 2021-06-24 RX ADMIN — SODIUM CHLORIDE, POTASSIUM CHLORIDE, SODIUM LACTATE AND CALCIUM CHLORIDE: 600; 310; 30; 20 INJECTION, SOLUTION INTRAVENOUS at 12:30

## 2021-06-24 RX ADMIN — FENTANYL CITRATE 100 MCG: 50 INJECTION, SOLUTION INTRAMUSCULAR; INTRAVENOUS at 12:32

## 2021-06-24 RX ADMIN — ALPRAZOLAM 0.5 MG: 0.25 TABLET ORAL at 00:58

## 2021-06-24 RX ADMIN — SODIUM CHLORIDE: 9 INJECTION, SOLUTION INTRAVENOUS at 02:12

## 2021-06-24 RX ADMIN — PROPOFOL 200 MCG/KG/MIN: 10 INJECTION, EMULSION INTRAVENOUS at 12:34

## 2021-06-24 RX ADMIN — ACETAMINOPHEN 650 MG: 325 TABLET ORAL at 15:45

## 2021-06-24 RX ADMIN — KETOROLAC TROMETHAMINE 15 MG: 30 INJECTION, SOLUTION INTRAMUSCULAR; INTRAVENOUS at 08:12

## 2021-06-24 RX ADMIN — KETOROLAC TROMETHAMINE 15 MG: 30 INJECTION, SOLUTION INTRAMUSCULAR; INTRAVENOUS at 20:34

## 2021-06-24 RX ADMIN — PIPERACILLIN SODIUM AND TAZOBACTAM SODIUM 3.38 G: 3; .375 INJECTION, POWDER, FOR SOLUTION INTRAVENOUS at 22:20

## 2021-06-24 RX ADMIN — SODIUM CHLORIDE, SODIUM LACTATE, POTASSIUM CHLORIDE, AND CALCIUM CHLORIDE: .6; .31; .03; .02 INJECTION, SOLUTION INTRAVENOUS at 12:07

## 2021-06-24 ASSESSMENT — PAIN SCALES - GENERAL
PAINLEVEL_OUTOF10: 6
PAINLEVEL_OUTOF10: 4
PAINLEVEL_OUTOF10: 7
PAINLEVEL_OUTOF10: 6
PAINLEVEL_OUTOF10: 5
PAINLEVEL_OUTOF10: 0
PAINLEVEL_OUTOF10: 5
PAINLEVEL_OUTOF10: 0
PAINLEVEL_OUTOF10: 6
PAINLEVEL_OUTOF10: 0
PAINLEVEL_OUTOF10: 6
PAINLEVEL_OUTOF10: 4
PAINLEVEL_OUTOF10: 10
PAINLEVEL_OUTOF10: 5
PAINLEVEL_OUTOF10: 4
PAINLEVEL_OUTOF10: 7
PAINLEVEL_OUTOF10: 5
PAINLEVEL_OUTOF10: 2
PAINLEVEL_OUTOF10: 7
PAINLEVEL_OUTOF10: 5

## 2021-06-24 ASSESSMENT — PAIN SCALES - WONG BAKER: WONGBAKER_NUMERICALRESPONSE: 3

## 2021-06-25 VITALS
DIASTOLIC BLOOD PRESSURE: 78 MMHG | HEART RATE: 69 BPM | SYSTOLIC BLOOD PRESSURE: 126 MMHG | TEMPERATURE: 99.3 F | BODY MASS INDEX: 25.02 KG/M2 | OXYGEN SATURATION: 97 % | HEIGHT: 72 IN | RESPIRATION RATE: 16 BRPM | WEIGHT: 184.75 LBS

## 2021-06-25 PROBLEM — K29.80 DUODENITIS: Status: ACTIVE | Noted: 2021-06-25

## 2021-06-25 PROBLEM — K31.7 GASTRIC POLYPS: Status: ACTIVE | Noted: 2021-06-25

## 2021-06-25 PROBLEM — R10.9 INTRACTABLE ABDOMINAL PAIN: Status: RESOLVED | Noted: 2021-06-22 | Resolved: 2021-06-25

## 2021-06-25 PROBLEM — K21.00 GASTROESOPHAGEAL REFLUX DISEASE WITH ESOPHAGITIS WITHOUT HEMORRHAGE: Status: ACTIVE | Noted: 2021-06-22

## 2021-06-25 PROBLEM — D72.829 LEUKOCYTOSIS: Status: RESOLVED | Noted: 2021-06-22 | Resolved: 2021-06-25

## 2021-06-25 LAB
ALBUMIN SERPL-MCNC: 3 G/DL (ref 3.6–5.1)
ALBUMIN/GLOB SERPL: 0.8 {RATIO} (ref 1–2.4)
ALP SERPL-CCNC: 102 UNITS/L (ref 45–117)
ALT SERPL-CCNC: 73 UNITS/L
ANION GAP SERPL CALC-SCNC: 11 MMOL/L (ref 10–20)
AST SERPL-CCNC: 17 UNITS/L
BASOPHILS # BLD: 0 K/MCL (ref 0–0.3)
BASOPHILS NFR BLD: 1 %
BILIRUB SERPL-MCNC: 0.8 MG/DL (ref 0.2–1)
BUN SERPL-MCNC: 14 MG/DL (ref 6–20)
BUN/CREAT SERPL: 16 (ref 7–25)
CALCIUM SERPL-MCNC: 8.5 MG/DL (ref 8.4–10.2)
CHLORIDE SERPL-SCNC: 110 MMOL/L (ref 98–107)
CO2 SERPL-SCNC: 27 MMOL/L (ref 21–32)
CREAT SERPL-MCNC: 0.89 MG/DL (ref 0.67–1.17)
DEPRECATED RDW RBC: 39.8 FL (ref 39–50)
EOSINOPHIL # BLD: 0.1 K/MCL (ref 0–0.5)
EOSINOPHIL NFR BLD: 2 %
ERYTHROCYTE [DISTWIDTH] IN BLOOD: 12.1 % (ref 11–15)
FASTING DURATION TIME PATIENT: ABNORMAL H
GFR SERPLBLD BASED ON 1.73 SQ M-ARVRAT: >90 ML/MIN/1.73M2
GLOBULIN SER-MCNC: 3.6 G/DL (ref 2–4)
GLUCOSE SERPL-MCNC: 98 MG/DL (ref 65–99)
HCT VFR BLD CALC: 36.6 % (ref 39–51)
HGB BLD-MCNC: 12.2 G/DL (ref 13–17)
IMM GRANULOCYTES # BLD AUTO: 0 K/MCL (ref 0–0.2)
IMM GRANULOCYTES # BLD: 0 %
LYMPHOCYTES # BLD: 1.8 K/MCL (ref 1–4.8)
LYMPHOCYTES NFR BLD: 30 %
MAGNESIUM SERPL-MCNC: 2 MG/DL (ref 1.7–2.4)
MCH RBC QN AUTO: 29.8 PG (ref 26–34)
MCHC RBC AUTO-ENTMCNC: 33.3 G/DL (ref 32–36.5)
MCV RBC AUTO: 89.5 FL (ref 78–100)
MONOCYTES # BLD: 0.5 K/MCL (ref 0.3–0.9)
MONOCYTES NFR BLD: 8 %
NEUTROPHILS # BLD: 3.5 K/MCL (ref 1.8–7.7)
NEUTROPHILS NFR BLD: 59 %
NRBC BLD MANUAL-RTO: 0 /100 WBC
PHOSPHATE SERPL-MCNC: 3.4 MG/DL (ref 2.4–4.7)
PLATELET # BLD AUTO: 241 K/MCL (ref 140–450)
POTASSIUM SERPL-SCNC: 3.6 MMOL/L (ref 3.4–5.1)
PROCALCITONIN SERPL IA-MCNC: 0.31 NG/ML
PROT SERPL-MCNC: 6.6 G/DL (ref 6.4–8.2)
RAINBOW EXTRA TUBES HOLD SPECIMEN: NORMAL
RBC # BLD: 4.09 MIL/MCL (ref 4.5–5.9)
SODIUM SERPL-SCNC: 144 MMOL/L (ref 135–145)
WBC # BLD: 5.8 K/MCL (ref 4.2–11)

## 2021-06-25 PROCEDURE — 10004651 HB RX, NO CHARGE ITEM: Performed by: FAMILY MEDICINE

## 2021-06-25 PROCEDURE — 83735 ASSAY OF MAGNESIUM: CPT | Performed by: FAMILY MEDICINE

## 2021-06-25 PROCEDURE — 10002803 HB RX 637: Performed by: FAMILY MEDICINE

## 2021-06-25 PROCEDURE — 10002800 HB RX 250 W HCPCS: Performed by: FAMILY MEDICINE

## 2021-06-25 PROCEDURE — 85025 COMPLETE CBC W/AUTO DIFF WBC: CPT | Performed by: FAMILY MEDICINE

## 2021-06-25 PROCEDURE — 99239 HOSP IP/OBS DSCHRG MGMT >30: CPT | Performed by: FAMILY MEDICINE

## 2021-06-25 PROCEDURE — 10002803 HB RX 637: Performed by: INTERNAL MEDICINE

## 2021-06-25 PROCEDURE — 36415 COLL VENOUS BLD VENIPUNCTURE: CPT | Performed by: FAMILY MEDICINE

## 2021-06-25 PROCEDURE — 84100 ASSAY OF PHOSPHORUS: CPT | Performed by: FAMILY MEDICINE

## 2021-06-25 PROCEDURE — 10002800 HB RX 250 W HCPCS: Performed by: INTERNAL MEDICINE

## 2021-06-25 PROCEDURE — 10002807 HB RX 258: Performed by: FAMILY MEDICINE

## 2021-06-25 PROCEDURE — 80053 COMPREHEN METABOLIC PANEL: CPT | Performed by: FAMILY MEDICINE

## 2021-06-25 PROCEDURE — 84145 PROCALCITONIN (PCT): CPT | Performed by: FAMILY MEDICINE

## 2021-06-25 RX ORDER — PANTOPRAZOLE SODIUM 40 MG/1
40 TABLET, DELAYED RELEASE ORAL
Qty: 60 TABLET | Refills: 0 | Status: SHIPPED | OUTPATIENT
Start: 2021-06-25

## 2021-06-25 RX ORDER — HYDROCODONE BITARTRATE AND ACETAMINOPHEN 10; 325 MG/1; MG/1
1 TABLET ORAL EVERY 6 HOURS PRN
Status: DISCONTINUED | OUTPATIENT
Start: 2021-06-25 | End: 2021-06-25 | Stop reason: HOSPADM

## 2021-06-25 RX ORDER — AMOXICILLIN AND CLAVULANATE POTASSIUM 875; 125 MG/1; MG/1
1 TABLET, FILM COATED ORAL EVERY 12 HOURS
Qty: 10 TABLET | Refills: 0 | Status: SHIPPED | OUTPATIENT
Start: 2021-06-25 | End: 2021-06-30

## 2021-06-25 RX ORDER — POTASSIUM CHLORIDE 20 MEQ/1
40 TABLET, EXTENDED RELEASE ORAL ONCE
Status: COMPLETED | OUTPATIENT
Start: 2021-06-25 | End: 2021-06-25

## 2021-06-25 RX ADMIN — SODIUM CHLORIDE: 9 INJECTION, SOLUTION INTRAVENOUS at 05:55

## 2021-06-25 RX ADMIN — PIPERACILLIN SODIUM AND TAZOBACTAM SODIUM 3.38 G: 3; .375 INJECTION, POWDER, FOR SOLUTION INTRAVENOUS at 15:02

## 2021-06-25 RX ADMIN — KETOROLAC TROMETHAMINE 15 MG: 30 INJECTION, SOLUTION INTRAMUSCULAR; INTRAVENOUS at 02:30

## 2021-06-25 RX ADMIN — MORPHINE SULFATE 2 MG: 4 INJECTION INTRAVENOUS at 06:03

## 2021-06-25 RX ADMIN — DULOXETINE HYDROCHLORIDE 90 MG: 60 CAPSULE, DELAYED RELEASE ORAL at 07:59

## 2021-06-25 RX ADMIN — PIPERACILLIN SODIUM AND TAZOBACTAM SODIUM 3.38 G: 3; .375 INJECTION, POWDER, FOR SOLUTION INTRAVENOUS at 05:58

## 2021-06-25 RX ADMIN — HYDROCODONE BITARTRATE AND ACETAMINOPHEN 1 TABLET: 10; 325 TABLET ORAL at 15:06

## 2021-06-25 RX ADMIN — SODIUM CHLORIDE, PRESERVATIVE FREE 2 ML: 5 INJECTION INTRAVENOUS at 08:31

## 2021-06-25 RX ADMIN — PANTOPRAZOLE SODIUM 40 MG: 40 TABLET, DELAYED RELEASE ORAL at 07:59

## 2021-06-25 RX ADMIN — POTASSIUM CHLORIDE 40 MEQ: 1500 TABLET, EXTENDED RELEASE ORAL at 12:32

## 2021-06-25 RX ADMIN — LORAZEPAM 0.5 MG: 2 INJECTION INTRAMUSCULAR; INTRAVENOUS at 08:11

## 2021-06-25 RX ADMIN — KETOROLAC TROMETHAMINE 15 MG: 30 INJECTION, SOLUTION INTRAMUSCULAR; INTRAVENOUS at 07:59

## 2021-06-25 ASSESSMENT — PAIN SCALES - GENERAL
PAINLEVEL_OUTOF10: 10
PAINLEVEL_OUTOF10: 6
PAINLEVEL_OUTOF10: 3
PAINLEVEL_OUTOF10: 8
PAINLEVEL_OUTOF10: 5
PAINLEVEL_OUTOF10: 6
PAINLEVEL_OUTOF10: 4

## 2021-06-27 LAB
BACTERIA BLD CULT: NORMAL
BACTERIA BLD CULT: NORMAL

## 2021-06-28 LAB
ASR DISCLAIMER: NORMAL
CASE RPRT: NORMAL
CLINICAL INFO: NORMAL
PATH REPORT.FINAL DX SPEC: NORMAL
PATH REPORT.GROSS SPEC: NORMAL

## 2021-06-29 PROBLEM — N50.89 TESTICULAR MASS: Status: ACTIVE | Noted: 2021-06-22

## 2021-06-29 PROBLEM — K29.80 DUODENITIS: Status: ACTIVE | Noted: 2021-06-25

## 2021-06-29 PROBLEM — M54.9 CHRONIC BACK PAIN: Status: ACTIVE | Noted: 2021-06-22

## 2021-06-29 PROBLEM — F32.A ANXIETY AND DEPRESSION: Status: ACTIVE | Noted: 2020-07-02

## 2021-06-29 PROBLEM — F41.9 ANXIETY AND DEPRESSION: Status: ACTIVE | Noted: 2020-07-02

## 2021-06-29 PROBLEM — G89.29 CHRONIC BACK PAIN: Status: ACTIVE | Noted: 2021-06-22

## 2021-06-29 PROBLEM — K76.0 FATTY LIVER: Status: ACTIVE | Noted: 2021-06-22

## 2021-06-29 PROBLEM — K21.00 GASTROESOPHAGEAL REFLUX DISEASE WITH ESOPHAGITIS WITHOUT HEMORRHAGE: Status: ACTIVE | Noted: 2021-06-22

## 2021-06-29 PROBLEM — K31.7 GASTRIC POLYPS: Status: ACTIVE | Noted: 2021-06-25

## 2021-06-29 PROBLEM — Z90.49 STATUS POST CHOLECYSTECTOMY: Status: ACTIVE | Noted: 2021-06-22

## 2021-06-29 PROBLEM — Z79.891 LONG TERM CURRENT USE OF OPIATE ANALGESIC: Status: ACTIVE | Noted: 2021-06-22

## 2021-07-05 ENCOUNTER — HOSPITAL ENCOUNTER (OUTPATIENT)
Dept: ULTRASOUND IMAGING | Age: 46
Discharge: HOME | End: 2021-07-05
Attending: INTERNAL MEDICINE
Payer: COMMERCIAL

## 2021-07-05 DIAGNOSIS — N50.811 PAIN IN BOTH TESTICLES: ICD-10-CM

## 2021-07-05 DIAGNOSIS — N50.812 PAIN IN BOTH TESTICLES: ICD-10-CM

## 2021-07-05 PROCEDURE — 76870 US EXAM SCROTUM: CPT | Performed by: INTERNAL MEDICINE

## 2021-07-05 PROCEDURE — 93975 VASCULAR STUDY: CPT | Performed by: INTERNAL MEDICINE

## 2022-03-01 ENCOUNTER — OFFICE VISIT (OUTPATIENT)
Dept: PHYSICAL MEDICINE AND REHAB | Facility: CLINIC | Age: 47
End: 2022-03-01
Payer: COMMERCIAL

## 2022-03-01 VITALS
HEIGHT: 72 IN | WEIGHT: 205 LBS | SYSTOLIC BLOOD PRESSURE: 124 MMHG | BODY MASS INDEX: 27.77 KG/M2 | HEART RATE: 84 BPM | DIASTOLIC BLOOD PRESSURE: 84 MMHG | OXYGEN SATURATION: 97 %

## 2022-03-01 DIAGNOSIS — M54.59 MECHANICAL LOW BACK PAIN: Primary | ICD-10-CM

## 2022-03-01 DIAGNOSIS — M48.061 LUMBAR FORAMINAL STENOSIS: ICD-10-CM

## 2022-03-01 DIAGNOSIS — S76.912A STRAIN OF LEFT ILIOPSOAS MUSCLE, INITIAL ENCOUNTER: ICD-10-CM

## 2022-03-01 DIAGNOSIS — M51.37 DDD (DEGENERATIVE DISC DISEASE), LUMBOSACRAL: ICD-10-CM

## 2022-03-01 DIAGNOSIS — M54.59 LUMBAR TRIGGER POINT SYNDROME: ICD-10-CM

## 2022-03-01 DIAGNOSIS — M79.10 MYALGIA: ICD-10-CM

## 2022-03-01 DIAGNOSIS — M51.26 BULGE OF LUMBAR DISC WITHOUT MYELOPATHY: ICD-10-CM

## 2022-03-01 DIAGNOSIS — M47.816 LUMBAR SPONDYLOSIS: ICD-10-CM

## 2022-03-01 DIAGNOSIS — M99.9 BIOMECHANICAL LESION: ICD-10-CM

## 2022-03-01 DIAGNOSIS — M51.16 LUMBAR DISC HERNIATION WITH RADICULOPATHY: ICD-10-CM

## 2022-03-01 DIAGNOSIS — K21.00 GASTROESOPHAGEAL REFLUX DISEASE WITH ESOPHAGITIS WITHOUT HEMORRHAGE: ICD-10-CM

## 2022-03-01 DIAGNOSIS — M47.816 FACET SYNDROME, LUMBAR: ICD-10-CM

## 2022-03-01 PROCEDURE — 3008F BODY MASS INDEX DOCD: CPT | Performed by: PHYSICAL MEDICINE & REHABILITATION

## 2022-03-01 PROCEDURE — 3074F SYST BP LT 130 MM HG: CPT | Performed by: PHYSICAL MEDICINE & REHABILITATION

## 2022-03-01 PROCEDURE — 99204 OFFICE O/P NEW MOD 45 MIN: CPT | Performed by: PHYSICAL MEDICINE & REHABILITATION

## 2022-03-01 PROCEDURE — 3079F DIAST BP 80-89 MM HG: CPT | Performed by: PHYSICAL MEDICINE & REHABILITATION

## 2022-03-01 RX ORDER — CYCLOBENZAPRINE HCL 5 MG
TABLET ORAL
Qty: 90 TABLET | Refills: 0 | Status: SHIPPED | OUTPATIENT
Start: 2022-03-01 | End: 2022-03-14

## 2022-03-01 NOTE — PATIENT INSTRUCTIONS
1) Start cyclobenzaprine 5 mg 1-2 tablets three times per day as needed for spasms. Do not operate heavy machinery while on this medication as it may make you sleepy. 2) Continue Percocet as per Dr. Yury Ibanez  3) Start formal physical Therapy   4) Please bring in CD of images for thoracic and lumbar spine  5) Tylenol 500-1000 mg every 6-8 hours as needed for pain. No more than 3000 mg daily. 6) Follow up with me to review the MRI images after about 4 weeks of therapy. At that point, depending on symptoms and what we find on MRI, we will discuss epidural injections. 7) Make a separate appointment to discuss the neck and upper back (Cevrical and thoracic) pain.

## 2022-03-04 ENCOUNTER — PATIENT MESSAGE (OUTPATIENT)
Dept: PHYSICAL MEDICINE AND REHAB | Facility: CLINIC | Age: 47
End: 2022-03-04

## 2022-03-04 ENCOUNTER — MED REC SCAN ONLY (OUTPATIENT)
Dept: PHYSICAL MEDICINE AND REHAB | Facility: CLINIC | Age: 47
End: 2022-03-04

## 2022-03-10 ENCOUNTER — TELEPHONE (OUTPATIENT)
Dept: PHYSICAL MEDICINE AND REHAB | Facility: CLINIC | Age: 47
End: 2022-03-10

## 2022-03-11 NOTE — TELEPHONE ENCOUNTER
Discs uploaded into PACS fron DOS:   04/20/2012 - MRI L-spine  06/07/2015 - L-shoulder  07/18/2020 - MRI L-spine  09/04/2020 - MRI T-spine    Unable to download disc from DOS 04/17/2014 MRI T-spine. Copy of reports from 2014 of L and T spine placed in Dr. Jazmín saenz. [facility].

## 2022-04-18 ENCOUNTER — MED REC SCAN ONLY (OUTPATIENT)
Dept: PHYSICAL MEDICINE AND REHAB | Facility: CLINIC | Age: 47
End: 2022-04-18

## 2022-07-12 ENCOUNTER — OFFICE VISIT (OUTPATIENT)
Dept: PHYSICAL MEDICINE AND REHAB | Facility: CLINIC | Age: 47
End: 2022-07-12
Payer: COMMERCIAL

## 2022-07-12 VITALS
DIASTOLIC BLOOD PRESSURE: 80 MMHG | WEIGHT: 182 LBS | HEART RATE: 83 BPM | BODY MASS INDEX: 24.65 KG/M2 | HEIGHT: 72 IN | SYSTOLIC BLOOD PRESSURE: 120 MMHG | OXYGEN SATURATION: 98 %

## 2022-07-12 DIAGNOSIS — M51.16 LUMBAR DISC HERNIATION WITH RADICULOPATHY: ICD-10-CM

## 2022-07-12 DIAGNOSIS — M54.59 LUMBAR TRIGGER POINT SYNDROME: ICD-10-CM

## 2022-07-12 DIAGNOSIS — M79.10 MYALGIA: ICD-10-CM

## 2022-07-12 DIAGNOSIS — K21.00 GASTROESOPHAGEAL REFLUX DISEASE WITH ESOPHAGITIS WITHOUT HEMORRHAGE: ICD-10-CM

## 2022-07-12 DIAGNOSIS — M47.816 FACET SYNDROME, LUMBAR: ICD-10-CM

## 2022-07-12 DIAGNOSIS — M51.37 DDD (DEGENERATIVE DISC DISEASE), LUMBOSACRAL: ICD-10-CM

## 2022-07-12 DIAGNOSIS — M99.9 BIOMECHANICAL LESION: ICD-10-CM

## 2022-07-12 DIAGNOSIS — S76.912A STRAIN OF LEFT ILIOPSOAS MUSCLE, INITIAL ENCOUNTER: ICD-10-CM

## 2022-07-12 DIAGNOSIS — M48.061 LUMBAR FORAMINAL STENOSIS: ICD-10-CM

## 2022-07-12 DIAGNOSIS — M47.816 LUMBAR SPONDYLOSIS: ICD-10-CM

## 2022-07-12 DIAGNOSIS — M51.26 BULGE OF LUMBAR DISC WITHOUT MYELOPATHY: ICD-10-CM

## 2022-07-12 DIAGNOSIS — M54.59 MECHANICAL LOW BACK PAIN: Primary | ICD-10-CM

## 2022-07-12 PROCEDURE — 3079F DIAST BP 80-89 MM HG: CPT | Performed by: PHYSICAL MEDICINE & REHABILITATION

## 2022-07-12 PROCEDURE — 99214 OFFICE O/P EST MOD 30 MIN: CPT | Performed by: PHYSICAL MEDICINE & REHABILITATION

## 2022-07-12 PROCEDURE — 3074F SYST BP LT 130 MM HG: CPT | Performed by: PHYSICAL MEDICINE & REHABILITATION

## 2022-07-12 PROCEDURE — 3008F BODY MASS INDEX DOCD: CPT | Performed by: PHYSICAL MEDICINE & REHABILITATION

## 2022-07-12 RX ORDER — GABAPENTIN 100 MG/1
100 CAPSULE ORAL 3 TIMES DAILY
Qty: 90 CAPSULE | Refills: 0 | Status: SHIPPED | OUTPATIENT
Start: 2022-07-12

## 2022-07-12 NOTE — PATIENT INSTRUCTIONS
1) My office will call you once the MRI is approved by your insurance. You should then schedule the MRI and call my office again to make an appointment with me 2-3 days after your exam for review of your images and a further plan. If your MRI is not being performed at [facility] or its affiliates, please make sure to bring a copy of the images. 2) Hold off on therapy for right now until we review the images  3) Continue Norco as per Dr. Rosalia Mora   4) Start gabapentin 100 mg three times per day. If limited improvement, then would increase to 200 mg three times per day.

## 2022-07-20 ENCOUNTER — HOSPITAL ENCOUNTER (OUTPATIENT)
Dept: MRI IMAGING | Facility: HOSPITAL | Age: 47
Discharge: HOME | End: 2022-07-20
Attending: PHYSICAL MEDICINE & REHABILITATION
Payer: COMMERCIAL

## 2022-07-20 DIAGNOSIS — M54.59 MECHANICAL LOW BACK PAIN: ICD-10-CM

## 2022-07-20 DIAGNOSIS — M54.59 LUMBAR TRIGGER POINT SYNDROME: ICD-10-CM

## 2022-07-20 DIAGNOSIS — S76.912A STRAIN OF LEFT ILIOPSOAS MUSCLE, INITIAL ENCOUNTER: ICD-10-CM

## 2022-07-20 DIAGNOSIS — M51.37 DDD (DEGENERATIVE DISC DISEASE), LUMBOSACRAL: ICD-10-CM

## 2022-07-20 DIAGNOSIS — K21.00 GASTROESOPHAGEAL REFLUX DISEASE WITH ESOPHAGITIS WITHOUT HEMORRHAGE: ICD-10-CM

## 2022-07-20 DIAGNOSIS — M99.9 BIOMECHANICAL LESION: ICD-10-CM

## 2022-07-20 DIAGNOSIS — M51.36 BULGE OF LUMBAR DISC WITHOUT MYELOPATHY: ICD-10-CM

## 2022-07-20 DIAGNOSIS — M79.10 MYALGIA: ICD-10-CM

## 2022-07-20 DIAGNOSIS — M51.16 LUMBAR DISC HERNIATION WITH RADICULOPATHY: ICD-10-CM

## 2022-07-20 DIAGNOSIS — M47.816 LUMBAR SPONDYLOSIS: ICD-10-CM

## 2022-07-20 DIAGNOSIS — M48.061 LUMBAR FORAMINAL STENOSIS: ICD-10-CM

## 2022-07-20 DIAGNOSIS — M47.816 FACET SYNDROME, LUMBAR: ICD-10-CM

## 2022-07-20 PROCEDURE — 72148 MRI LUMBAR SPINE W/O DYE: CPT | Performed by: PHYSICAL MEDICINE & REHABILITATION

## 2022-07-21 ENCOUNTER — TELEPHONE (OUTPATIENT)
Dept: NEUROLOGY | Facility: CLINIC | Age: 47
End: 2022-07-21

## 2022-07-21 NOTE — TELEPHONE ENCOUNTER
Spoke with patient and let him know it can take a few days up to a week for Dr. Shaina Tipton to review images and for us to get back to him. Patient would like to know if Dr. Shaina Tipton reviewed the L and T spine MRIs from 2020. I let him know I will ask Dr. Shaina Tipton. Spoke with Dr. Shaina Tipton and he would like to schedule a virtual appt with patient.

## 2022-07-21 NOTE — TELEPHONE ENCOUNTER
Patient calling to inform that MRI order by Betty Abdalla was completed on 7/20/22 and will like a call back to inform results and see how Dr.Behar will proceed from here, he will be out of town for 10 days but is ok to leave a msg with results.

## 2022-07-26 ENCOUNTER — TELEPHONE (OUTPATIENT)
Dept: PHYSICAL MEDICINE AND REHAB | Facility: CLINIC | Age: 47
End: 2022-07-26

## 2022-08-08 DIAGNOSIS — M79.10 MYALGIA: ICD-10-CM

## 2022-08-08 DIAGNOSIS — M54.59 MECHANICAL LOW BACK PAIN: ICD-10-CM

## 2022-08-08 DIAGNOSIS — M47.816 LUMBAR SPONDYLOSIS: ICD-10-CM

## 2022-08-08 DIAGNOSIS — M54.59 LUMBAR TRIGGER POINT SYNDROME: ICD-10-CM

## 2022-08-08 DIAGNOSIS — M51.36 BULGE OF LUMBAR DISC WITHOUT MYELOPATHY: ICD-10-CM

## 2022-08-08 DIAGNOSIS — M51.37 DDD (DEGENERATIVE DISC DISEASE), LUMBOSACRAL: ICD-10-CM

## 2022-08-08 DIAGNOSIS — S76.912A STRAIN OF LEFT ILIOPSOAS MUSCLE, INITIAL ENCOUNTER: ICD-10-CM

## 2022-08-08 DIAGNOSIS — M51.16 LUMBAR DISC HERNIATION WITH RADICULOPATHY: ICD-10-CM

## 2022-08-08 DIAGNOSIS — K21.00 GASTROESOPHAGEAL REFLUX DISEASE WITH ESOPHAGITIS WITHOUT HEMORRHAGE: ICD-10-CM

## 2022-08-08 DIAGNOSIS — M48.061 LUMBAR FORAMINAL STENOSIS: ICD-10-CM

## 2022-08-08 DIAGNOSIS — M99.9 BIOMECHANICAL LESION: ICD-10-CM

## 2022-08-08 DIAGNOSIS — M47.816 FACET SYNDROME, LUMBAR: ICD-10-CM

## 2022-08-08 NOTE — TELEPHONE ENCOUNTER
Refill Request    Medication request: gabapentin 100 MG Oral Cap    LOV:7/12/2022 Hannah uKhn MD   Due back to clinic per last office note:  Farnaz [place]: 8/12/2022 Hannah Kuhn MD      [place]: 7[place]12/22 #90    Urine drug screen (if applicable): None  Pain contract: None    LOV plan (if weaning or changing medications): Start gabapentin 100 mg three times per day. If limited improvement, then would increase to 200 mg three times per day. Patient would like to continue with rx 100mg to 200mg TID. Order has been modified.

## 2022-08-09 RX ORDER — GABAPENTIN 100 MG/1
200 CAPSULE ORAL 3 TIMES DAILY
Qty: 180 CAPSULE | Refills: 0 | Status: SHIPPED | OUTPATIENT
Start: 2022-08-09

## 2022-08-12 ENCOUNTER — TELEMEDICINE (OUTPATIENT)
Dept: PHYSICAL MEDICINE AND REHAB | Facility: CLINIC | Age: 47
End: 2022-08-12
Payer: COMMERCIAL

## 2022-08-12 ENCOUNTER — TELEPHONE (OUTPATIENT)
Dept: NEUROLOGY | Facility: CLINIC | Age: 47
End: 2022-08-12

## 2022-08-12 DIAGNOSIS — M79.10 MYALGIA: ICD-10-CM

## 2022-08-12 DIAGNOSIS — M51.37 DDD (DEGENERATIVE DISC DISEASE), LUMBOSACRAL: ICD-10-CM

## 2022-08-12 DIAGNOSIS — M48.061 LUMBAR FORAMINAL STENOSIS: ICD-10-CM

## 2022-08-12 DIAGNOSIS — M54.59 LUMBAR TRIGGER POINT SYNDROME: ICD-10-CM

## 2022-08-12 DIAGNOSIS — M51.36 BULGE OF LUMBAR DISC WITHOUT MYELOPATHY: ICD-10-CM

## 2022-08-12 DIAGNOSIS — K21.00 GASTROESOPHAGEAL REFLUX DISEASE WITH ESOPHAGITIS WITHOUT HEMORRHAGE: ICD-10-CM

## 2022-08-12 DIAGNOSIS — M54.59 MECHANICAL LOW BACK PAIN: Primary | ICD-10-CM

## 2022-08-12 DIAGNOSIS — M47.816 FACET SYNDROME, LUMBAR: ICD-10-CM

## 2022-08-12 DIAGNOSIS — S76.912A STRAIN OF LEFT ILIOPSOAS MUSCLE, INITIAL ENCOUNTER: ICD-10-CM

## 2022-08-12 DIAGNOSIS — M47.816 LUMBAR SPONDYLOSIS: ICD-10-CM

## 2022-08-12 DIAGNOSIS — M51.16 LUMBAR DISC HERNIATION WITH RADICULOPATHY: ICD-10-CM

## 2022-08-12 DIAGNOSIS — M99.9 BIOMECHANICAL LESION: ICD-10-CM

## 2022-08-12 PROCEDURE — 99214 OFFICE O/P EST MOD 30 MIN: CPT | Performed by: PHYSICAL MEDICINE & REHABILITATION

## 2022-08-12 NOTE — PATIENT INSTRUCTIONS
1) My office will call you to schedule the LEFT L5 and LEFT S1 TFESI under IVCSonce the procedure is approved by your insurance carrier. 2) Follow up with me 2 weeks after the procedure.

## 2022-08-12 NOTE — TELEPHONE ENCOUNTER
Anesthesia Evaluation     Patient summary reviewed and Nursing notes reviewed   no history of anesthetic complications:  NPO Solid Status: > 8 hours  NPO Liquid Status: > 8 hours           Airway   Dental      Pulmonary    (+) pulmonary embolism, sleep apnea on CPAP,   Cardiovascular     ECG reviewed  PT is on anticoagulation therapy  Patient on routine beta blocker    (+) hypertension, valvular problems/murmurs MR and TI, CAD, PVD, DVT, hyperlipidemia,       Neuro/Psych  (+) CVA, numbness, psychiatric history Depression,     GI/Hepatic/Renal/Endo    (+)  hiatal hernia, GERD,  renal disease CRI, diabetes mellitus, thyroid problem hypothyroidism    Musculoskeletal     Abdominal    Substance History      OB/GYN          Other   arthritis,      ROS/Med Hx Other: Dysphagia, gangrene of foot, cellulitis/osteomyelitis, chest pain, h/o DKA, fatigue, neuropathy, foot pain, low vit D, palpitations, allergies, retinopathy, IBS, esophageal stricture, h/o sepsis, bursitis, rash     Echo  Technically satisfactory study.  Mitral valve is thickened with adequate opening motion.  Moderate mitral regurgitation is present.  Tricuspid valve is normal.  Aortic valve is tricuspid and is normal.  Left atrium is normal in size.  Left atrial appendage is enlarged without any clot.  Left ventricle is normal in size and contractility with ejection fraction of 60%.  Right atrium is normal in size.  No pericardial effusion or intracardiac thrombus is seen.  Atrial septum is intact without PFO.  Aorta is normal.     Impression  Moderate mitral regurgitation  Normal left ventricle size and contractility with ejection fraction of 60%.  No evidence for intracardiac thrombus is present.    PSH  TOTAL HIP ARTHROPLASTY TOTAL HIP ARTHROPLASTY  ENDOSCOPY ENDOSCOPY  HERNIA REPAIR CARDIAC CATHETERIZATION  AMPUTATION FOOT / TOE GANGLION CYST EXCISION  RETINOPATHY SURGERY ENDOSCOPY  TRANS METATARSAL AMPUTATION BELOW KNEE AMPUTATION  AMPUTATION  LEFT L5 and LEFT S1 TFESI CPT CODE: 50818,52257,07472    Status: pending approval     [facility], to initiate authorization for request.   Case# [id number]     Clinical notes sent to [facility] for clinical review. REVISION AMPUTATION REVISION                Anesthesia Plan    ASA 4     MAC   (Patient identified; pre-operative vital signs, all relevant labs/studies, complete medical/surgical/anesthetic history, full medication list, full allergy list, and NPO status obtained/reviewed; physical assessment performed; anesthetic options, side effects, potential complications, risks, and benefits discussed; questions answered; written anesthesia consent obtained; patient cleared for procedure; anesthesia machine and equipment checked and functioning)    Anesthetic plan, all risks, benefits, and alternatives have been provided, discussed and informed consent has been obtained with: patient.    Plan discussed with CRNA and CAA.

## 2022-08-15 PROBLEM — M79.609 PAIN IN SOFT TISSUES OF LIMB: Status: ACTIVE | Noted: 2022-08-09

## 2022-08-16 NOTE — TELEPHONE ENCOUNTER
Incoming Epic message received from [address] [place] [address], requesting status for LEFT L5 and LEFT S1 TFESI    [facility] for status on Case# [id number]. Spoke to [employer] who states request is currently in pending.  Determination can take between 10-15 days      Request was marked, urgent     Call Reference#     Notified Dr. Tyronne Spatz of above

## 2022-08-18 ENCOUNTER — TELEPHONE (OUTPATIENT)
Dept: NEUROLOGY | Facility: CLINIC | Age: 47
End: 2022-08-18

## 2022-08-18 NOTE — TELEPHONE ENCOUNTER
Received Prescriber Response Form to be completed by physician and fax back to [phone]. Placed in [facility] folder by nurse saenz.

## 2022-09-04 DIAGNOSIS — M47.816 LUMBAR SPONDYLOSIS: ICD-10-CM

## 2022-09-04 DIAGNOSIS — M51.16 LUMBAR DISC HERNIATION WITH RADICULOPATHY: ICD-10-CM

## 2022-09-04 DIAGNOSIS — K21.00 GASTROESOPHAGEAL REFLUX DISEASE WITH ESOPHAGITIS WITHOUT HEMORRHAGE: ICD-10-CM

## 2022-09-04 DIAGNOSIS — S76.912A STRAIN OF LEFT ILIOPSOAS MUSCLE, INITIAL ENCOUNTER: ICD-10-CM

## 2022-09-04 DIAGNOSIS — M47.816 FACET SYNDROME, LUMBAR: ICD-10-CM

## 2022-09-04 DIAGNOSIS — M54.59 MECHANICAL LOW BACK PAIN: ICD-10-CM

## 2022-09-04 DIAGNOSIS — M54.59 LUMBAR TRIGGER POINT SYNDROME: ICD-10-CM

## 2022-09-04 DIAGNOSIS — M51.36 BULGE OF LUMBAR DISC WITHOUT MYELOPATHY: ICD-10-CM

## 2022-09-04 DIAGNOSIS — M48.061 LUMBAR FORAMINAL STENOSIS: ICD-10-CM

## 2022-09-04 DIAGNOSIS — M79.10 MYALGIA: ICD-10-CM

## 2022-09-04 DIAGNOSIS — M99.9 BIOMECHANICAL LESION: ICD-10-CM

## 2022-09-04 DIAGNOSIS — M51.37 DDD (DEGENERATIVE DISC DISEASE), LUMBOSACRAL: ICD-10-CM

## 2022-09-06 RX ORDER — GABAPENTIN 100 MG/1
200 CAPSULE ORAL 3 TIMES DAILY
Qty: 180 CAPSULE | Refills: 0 | Status: SHIPPED | OUTPATIENT
Start: 2022-09-06

## 2022-09-06 NOTE — TELEPHONE ENCOUNTER
Refill Request    Medication request: gabapentin 100 MG Oral Cap  Take 2 capsules (200 mg total) by mouth 3 (three) times daily. RGY:7/80/2465 Brandyn Osborne MD   Due back to clinic per last office note:  2 weeks after procedure  NOV: Visit date not found      [place]: 08/09/22 #180    Urine drug screen (if applicable): none  Pain contract: none    LOV plan (if weaning or changing medications): Start gabapentin 100 mg three times per day. If limited improvement, then would increase to 200 mg three times per day.

## 2022-09-28 ENCOUNTER — OFFICE VISIT (OUTPATIENT)
Dept: SURGERY | Facility: CLINIC | Age: 47
End: 2022-09-28

## 2022-09-28 DIAGNOSIS — M51.36 BULGE OF LUMBAR DISC WITHOUT MYELOPATHY: ICD-10-CM

## 2022-09-28 DIAGNOSIS — M54.59 MECHANICAL LOW BACK PAIN: ICD-10-CM

## 2022-09-28 DIAGNOSIS — M48.061 LUMBAR FORAMINAL STENOSIS: ICD-10-CM

## 2022-09-28 DIAGNOSIS — M47.816 LUMBAR SPONDYLOSIS: ICD-10-CM

## 2022-09-28 DIAGNOSIS — M47.816 FACET SYNDROME, LUMBAR: ICD-10-CM

## 2022-09-28 DIAGNOSIS — M51.37 DDD (DEGENERATIVE DISC DISEASE), LUMBOSACRAL: Primary | ICD-10-CM

## 2022-09-28 DIAGNOSIS — M54.16 LUMBAR RADICULOPATHY: ICD-10-CM

## 2022-09-28 PROCEDURE — 99152 MOD SED SAME PHYS/QHP 5/>YRS: CPT | Performed by: PHYSICAL MEDICINE & REHABILITATION

## 2022-09-28 PROCEDURE — 64484 NJX AA&/STRD TFRM EPI L/S EA: CPT | Performed by: PHYSICAL MEDICINE & REHABILITATION

## 2022-09-28 PROCEDURE — 64483 NJX AA&/STRD TFRM EPI L/S 1: CPT | Performed by: PHYSICAL MEDICINE & REHABILITATION

## 2022-09-28 NOTE — PROCEDURES
[facility]    2-LEVEL LUMBAR TRANSFORAMINAL   NAME:  Vin Green II    MR #:    [id number] :  1975     PHYSICIAN:  Rhonda Tran MD        Operative Report    DATE OF PROCEDURE: 2022   PREOPERATIVE DIAGNOSES: 1. DDD (degenerative disc disease), lumbosacral    2. Mechanical low back pain    3. Lumbar spondylosis    4. Bulge of lumbar disc without myelopathy    5. Lumbar foraminal stenosis    6. Lumbar radiculopathy    7. Facet syndrome, lumbar        POSTOPERATIVE DIAGNOSES:   1. DDD (degenerative disc disease), lumbosacral    2. Mechanical low back pain    3. Lumbar spondylosis    4. Bulge of lumbar disc without myelopathy    5. Lumbar foraminal stenosis    6. Lumbar radiculopathy    7. Facet syndrome, lumbar        PROCEDURES: Left L5 and S1 transforaminal epidural steroid injections done under fluoroscopic guidance with contrast enhancement. SURGEON: Rhonda Tran MD   ANESTHESIA: Local   INDICATIONS:      OPERATIVE PROCEDURE:  Written consent was obtained from the patient. The patient was brought into the operating room and placed in the prone position on the fluoroscopy table with pillow underneath the abdomen. The patient's skin was cleaned and draped in a normal sterile fashion. Using AP fluoroscopy, all five lumbar vertebrae were identified. When the Left L5 and S1 vertebrae were identified, fluoroscopy was Left anterior obliqued opening up the Left L5 and S1 intervertebral foramen. At this point in time, each site was anesthetized with 1% PF lidocaine without epinephrine. Then, 3.5 inch, 22-gauge spinal needles were inserted and directed towards the Left L5 and S1 intervertebral foramen. When they felt to be in good position, AP fluoroscopy was used to advance the needles to the 6 o'clock position on the Left L5 and S1  pedicles. At this point in time, Omnipaque-240 contrast was used to obtain a good epidurogram indicating correct needle placement at each level. Then, aspiration was performed. No blood, fluid, or air was aspirated. Then, the patient was injected with a 4 cc solution of 2cc 6mg/cc of Betamethasone and 2cc of 1% PF lidocaine without epinephrine at each site. After this, the needles were removed. Each site was cleaned. Band-Aids were applied. The patient was transferred to the cart and into [facility]. The patient was given discharge instructions and will follow up in the clinic as scheduled. Throughout the whole procedure, the patient's pulse oximetry and vital signs were monitored and they remained completely stable. Also, throughout the whole procedure, prior to injection of any medication, aspiration was performed. No blood, fluid, or air was aspirated at anytime. eNel Soto.  Nikita Hall MD, [address] [facility]  Physical Medicine and Rehabilitation/Sports Medicine  [place] [address]

## 2022-09-28 NOTE — PATIENT INSTRUCTIONS
Post Injection Instructions     1. Please do not do anything strenuous over the next 24 hours (if you had a knee injection do not walk more than 2 city blocks, do not attend any aerobic classes, do not run, no heavy lifting, no prolong standing). 2. You may resume your day to day activities after your injection. 3. You may experience some mild amount of swelling and discomfort after the procedure. 4. Please ice the area that was injected at least 5-6 times a day (15 minutes) for two days after (this will help prevent worsening pain that sometimes occurs after an injection). 5. Only take tylenol if needed for pain for the first few days. 6. Watch for signs of infection which include redness, warmth, worsening pain, fevers or chills. If you develop any of these signs call the office immediately at [phone]    7. My office will call you in 2 days to check in and see how you are feeling. 8. Follow up with me in the office 2 weeks after your injection. Everyone responds differently to injections, but you can expect your peak effects a few weeks after your last injection. Bladimir Ng.  Sherif Heller MD  Physical Medicine and Rehabilitation/Sports Medicine  [facility]

## 2022-10-20 ENCOUNTER — TELEPHONE (OUTPATIENT)
Dept: NEUROLOGY | Facility: CLINIC | Age: 47
End: 2022-10-20

## 2022-11-04 ENCOUNTER — TELEMEDICINE (OUTPATIENT)
Dept: PHYSICAL MEDICINE AND REHAB | Facility: CLINIC | Age: 47
End: 2022-11-04
Payer: COMMERCIAL

## 2022-11-04 ENCOUNTER — TELEPHONE (OUTPATIENT)
Dept: NEUROLOGY | Facility: CLINIC | Age: 47
End: 2022-11-04

## 2022-11-04 DIAGNOSIS — K21.00 GASTROESOPHAGEAL REFLUX DISEASE WITH ESOPHAGITIS WITHOUT HEMORRHAGE: ICD-10-CM

## 2022-11-04 DIAGNOSIS — S76.912A STRAIN OF LEFT ILIOPSOAS MUSCLE, INITIAL ENCOUNTER: ICD-10-CM

## 2022-11-04 DIAGNOSIS — M54.59 MECHANICAL LOW BACK PAIN: Primary | ICD-10-CM

## 2022-11-04 DIAGNOSIS — M51.36 BULGE OF LUMBAR DISC WITHOUT MYELOPATHY: ICD-10-CM

## 2022-11-04 DIAGNOSIS — M47.816 FACET SYNDROME, LUMBAR: ICD-10-CM

## 2022-11-04 DIAGNOSIS — M48.061 LUMBAR FORAMINAL STENOSIS: ICD-10-CM

## 2022-11-04 DIAGNOSIS — M51.16 LUMBAR DISC HERNIATION WITH RADICULOPATHY: ICD-10-CM

## 2022-11-04 DIAGNOSIS — M54.59 LUMBAR TRIGGER POINT SYNDROME: ICD-10-CM

## 2022-11-04 DIAGNOSIS — M51.37 DDD (DEGENERATIVE DISC DISEASE), LUMBOSACRAL: ICD-10-CM

## 2022-11-04 DIAGNOSIS — M79.10 MYALGIA: ICD-10-CM

## 2022-11-04 DIAGNOSIS — M99.9 BIOMECHANICAL LESION: ICD-10-CM

## 2022-11-04 DIAGNOSIS — M47.816 LUMBAR SPONDYLOSIS: ICD-10-CM

## 2022-11-04 PROCEDURE — 99214 OFFICE O/P EST MOD 30 MIN: CPT | Performed by: PHYSICAL MEDICINE & REHABILITATION

## 2022-11-04 NOTE — TELEPHONE ENCOUNTER
LEFT L5 and LEFt S1 TFESI cpt codes 83414-AZ, 15567-LH, 98565.  Tracking #: [id number] pending determination

## 2022-11-04 NOTE — PATIENT INSTRUCTIONS
1) My office will call you to schedule the LEFT L5 and LEFT S1 TFESI under local anesthesia once the procedure is approved by your insurance carrier. 2) Injection should be scheduled once cleared by foot surgeon  3) Continue with home exercise program  4) Continue tizanidine, Gabapentin, and Norco as needed for pain. 5) If symptoms do not improve with injection, then would recommend BILATERAL L4-L5 and L5-S1 Facet joint injections   6) Follow up with me 2 weeks after procedure in the office.

## 2023-01-27 ENCOUNTER — TELEMEDICINE (OUTPATIENT)
Dept: PHYSICAL MEDICINE AND REHAB | Facility: CLINIC | Age: 48
End: 2023-01-27
Payer: COMMERCIAL

## 2023-01-27 DIAGNOSIS — M51.36 BULGE OF LUMBAR DISC WITHOUT MYELOPATHY: ICD-10-CM

## 2023-01-27 DIAGNOSIS — M54.59 LUMBAR TRIGGER POINT SYNDROME: ICD-10-CM

## 2023-01-27 DIAGNOSIS — M51.37 DDD (DEGENERATIVE DISC DISEASE), LUMBOSACRAL: ICD-10-CM

## 2023-01-27 DIAGNOSIS — M79.10 MYALGIA: ICD-10-CM

## 2023-01-27 DIAGNOSIS — K21.00 GASTROESOPHAGEAL REFLUX DISEASE WITH ESOPHAGITIS WITHOUT HEMORRHAGE: ICD-10-CM

## 2023-01-27 DIAGNOSIS — M54.59 MECHANICAL LOW BACK PAIN: Primary | ICD-10-CM

## 2023-01-27 DIAGNOSIS — M48.061 LUMBAR FORAMINAL STENOSIS: ICD-10-CM

## 2023-01-27 DIAGNOSIS — S76.912A STRAIN OF LEFT ILIOPSOAS MUSCLE, INITIAL ENCOUNTER: ICD-10-CM

## 2023-01-27 DIAGNOSIS — M51.16 LUMBAR DISC HERNIATION WITH RADICULOPATHY: ICD-10-CM

## 2023-01-27 DIAGNOSIS — M99.9 BIOMECHANICAL LESION: ICD-10-CM

## 2023-01-27 DIAGNOSIS — M47.816 FACET SYNDROME, LUMBAR: ICD-10-CM

## 2023-01-27 DIAGNOSIS — M47.816 LUMBAR SPONDYLOSIS: ICD-10-CM

## 2023-01-27 PROCEDURE — 99214 OFFICE O/P EST MOD 30 MIN: CPT | Performed by: PHYSICAL MEDICINE & REHABILITATION

## 2023-01-27 NOTE — PATIENT INSTRUCTIONS
1) My office will call you to schedule the LEFT L5 and LEFT S1 TFESI under local anesthesia once the procedure is approved by your insurance carrier. 2) Follow up with me 2 weeks after the procedure.  If symptoms persist, then would recommend BILATERAL L4-L5 and L5-S1 facet joint injections   3) Continue pain regiment

## 2023-01-30 ENCOUNTER — TELEPHONE (OUTPATIENT)
Dept: PHYSICAL MEDICINE AND REHAB | Facility: CLINIC | Age: 48
End: 2023-01-30

## 2023-01-30 NOTE — TELEPHONE ENCOUNTER
Initiated authorization for  Left L5 and S1 Transforaminal epidural steroid injections CPT 17738+[id number] dx:M48.061 to be done at [place] with [facility]  Case #[id number].   [facility] requested clinicals-clinicals uploaded   Status: pending

## 2023-02-01 ENCOUNTER — WALK IN (OUTPATIENT)
Dept: URGENT CARE | Age: 48
End: 2023-02-01
Attending: FAMILY MEDICINE

## 2023-02-01 VITALS
WEIGHT: 195 LBS | DIASTOLIC BLOOD PRESSURE: 86 MMHG | SYSTOLIC BLOOD PRESSURE: 136 MMHG | BODY MASS INDEX: 26.41 KG/M2 | OXYGEN SATURATION: 98 % | HEIGHT: 72 IN | TEMPERATURE: 97.2 F | RESPIRATION RATE: 12 BRPM | HEART RATE: 78 BPM

## 2023-02-01 DIAGNOSIS — H60.501 ACUTE OTITIS EXTERNA OF RIGHT EAR, UNSPECIFIED TYPE: Primary | ICD-10-CM

## 2023-02-01 DIAGNOSIS — S00.411A ABRASION OF RIGHT EAR CANAL, INITIAL ENCOUNTER: ICD-10-CM

## 2023-02-01 PROCEDURE — 99213 OFFICE O/P EST LOW 20 MIN: CPT

## 2023-02-01 RX ORDER — CIPROFLOXACIN AND DEXAMETHASONE 3; 1 MG/ML; MG/ML
4 SUSPENSION/ DROPS AURICULAR (OTIC) 2 TIMES DAILY
Qty: 7.5 ML | Refills: 0 | Status: SHIPPED | OUTPATIENT
Start: 2023-02-01

## 2023-02-01 RX ORDER — ESCITALOPRAM OXALATE 20 MG/1
TABLET ORAL
COMMUNITY
Start: 2023-01-02

## 2023-02-01 RX ORDER — GABAPENTIN 100 MG/1
CAPSULE ORAL
COMMUNITY
Start: 2022-12-23

## 2023-02-01 ASSESSMENT — ENCOUNTER SYMPTOMS
FEVER: 0
CONSTITUTIONAL NEGATIVE: 1
RESPIRATORY NEGATIVE: 1
NEUROLOGICAL NEGATIVE: 1
GASTROINTESTINAL NEGATIVE: 1
HEADACHES: 0

## 2023-02-01 ASSESSMENT — PAIN SCALES - GENERAL: PAINLEVEL: 8

## 2023-02-01 NOTE — TELEPHONE ENCOUNTER
Received call from Sonia Higginbotham at [facility] who states since there is a previous case for exact same procedure that was Denied 11/18/22 Case #[id number] are unable to initiate another authorization until 6 mos from date of denial (5/18/2023)  Previous denial reason-not medically necessary  Next option is to file Appeal  Appeal ph. [phone] option #5  Fax [phone]

## 2023-02-22 DIAGNOSIS — M47.816 LUMBAR SPONDYLOSIS: Primary | ICD-10-CM

## 2023-02-22 NOTE — TELEPHONE ENCOUNTER
Recommend meloxicam 15mg qDaily x7 days, then qDaily PRN pain, dispense 30 tablets. Take with food. OK to take with his other medications.

## 2023-02-22 NOTE — TELEPHONE ENCOUNTER
Location of Pain: bilateral back pain  Old or new pain: old  Date Pain Began:  2/8/23         Numeric Rating Scale:  Pain at Present:    10                                                                                                                            Minimum Pain:   8  Maximum Pain  10    Distribution of Pain:    bilateral  Quality of Pain:   burning, sharp/stabbing and throbbing  Aggravating Factors:    Sitting, Standing and Walking  Current pain treatment: heat/ice, PT and OTC medications Tylenol ES. Also taking Gabapentin, Norco    LOV:7/12/2022 Gila Young MD   NOV: 3/6/2023 Gila Young MD     Summary of patient request: Pain is constant. Pain subsides if he is in fetal position when lying down with a diana size pillow between legs; can only sleeps 3 hrs at a time. On the Right side -  pain radiates up to thoracic and down to buttocks to hamstring. On the left side - pain radiates up to thoracic down to buttocks  to hamstring down to calf. If he raises left foot, pain radiates up to the thoracic. On the right side- same when heel hits the ground develops sharp stabbing pain  Norco and JULIO is not working added OTC ES Tylenol for some relief. Left L5 and S1 Transforaminal epidural steroid injections. Previously denied so unable to initiate new case for 6 mos around 5/18/23    Please review and advise.

## 2023-02-23 NOTE — TELEPHONE ENCOUNTER
Patient requesting a call from nurse as he can not take medication recently prescribed by  due to medical issue. Please call.

## 2023-02-23 NOTE — TELEPHONE ENCOUNTER
Pt.states he cannot take Nsaids because he has Garretts IBD, GERD and (SIBO)Small intestinal bacterial growth. Will inform Dr. Elvie Garza.

## 2023-02-23 NOTE — TELEPHONE ENCOUNTER
Pt. informed Dr. Michelle Degroot is recommending Meloxicam 15 mg q daily for 7 days the q daily PRN for pain. Take with food. Ok to take with your other medications. Pt. was thankful.

## 2023-02-24 RX ORDER — PREGABALIN 75 MG/1
75 CAPSULE ORAL 2 TIMES DAILY
Qty: 60 CAPSULE | Refills: 0 | Status: SHIPPED | OUTPATIENT
Start: 2023-02-24

## 2023-02-24 NOTE — TELEPHONE ENCOUNTER
Pt. informed Dr. Bruce Ybarra recommends a trial of Lyrica 75 mg BID, if you agree, you would have to stop the Gabapentin. Pt. agreed. Will stop the Gabapentin. Order pended.   Please review and sign if appropriate

## 2023-03-01 ENCOUNTER — MED REC SCAN ONLY (OUTPATIENT)
Dept: PHYSICAL MEDICINE AND REHAB | Facility: CLINIC | Age: 48
End: 2023-03-01

## 2023-03-06 ENCOUNTER — OFFICE VISIT (OUTPATIENT)
Dept: PHYSICAL MEDICINE AND REHAB | Facility: CLINIC | Age: 48
End: 2023-03-06
Payer: COMMERCIAL

## 2023-03-06 VITALS
BODY MASS INDEX: 26.41 KG/M2 | HEIGHT: 72 IN | WEIGHT: 195 LBS | DIASTOLIC BLOOD PRESSURE: 80 MMHG | SYSTOLIC BLOOD PRESSURE: 130 MMHG

## 2023-03-06 DIAGNOSIS — M51.36 BULGE OF LUMBAR DISC WITHOUT MYELOPATHY: ICD-10-CM

## 2023-03-06 DIAGNOSIS — I10 ESSENTIAL HYPERTENSION: ICD-10-CM

## 2023-03-06 DIAGNOSIS — M47.816 LUMBAR SPONDYLOSIS: Primary | ICD-10-CM

## 2023-03-06 DIAGNOSIS — K21.00 GASTROESOPHAGEAL REFLUX DISEASE WITH ESOPHAGITIS WITHOUT HEMORRHAGE: ICD-10-CM

## 2023-03-06 DIAGNOSIS — M48.061 LUMBAR FORAMINAL STENOSIS: ICD-10-CM

## 2023-03-06 DIAGNOSIS — M54.59 LUMBAR TRIGGER POINT SYNDROME: ICD-10-CM

## 2023-03-06 DIAGNOSIS — M99.9 BIOMECHANICAL LESION: ICD-10-CM

## 2023-03-06 DIAGNOSIS — M51.37 DDD (DEGENERATIVE DISC DISEASE), LUMBOSACRAL: ICD-10-CM

## 2023-03-06 DIAGNOSIS — M47.816 FACET SYNDROME, LUMBAR: ICD-10-CM

## 2023-03-06 DIAGNOSIS — M51.16 LUMBAR DISC HERNIATION WITH RADICULOPATHY: ICD-10-CM

## 2023-03-06 DIAGNOSIS — S76.912A STRAIN OF LEFT ILIOPSOAS MUSCLE, INITIAL ENCOUNTER: ICD-10-CM

## 2023-03-06 DIAGNOSIS — M79.10 MYALGIA: ICD-10-CM

## 2023-03-06 DIAGNOSIS — M54.59 MECHANICAL LOW BACK PAIN: ICD-10-CM

## 2023-03-06 PROCEDURE — 3075F SYST BP GE 130 - 139MM HG: CPT | Performed by: PHYSICAL MEDICINE & REHABILITATION

## 2023-03-06 PROCEDURE — 99214 OFFICE O/P EST MOD 30 MIN: CPT | Performed by: PHYSICAL MEDICINE & REHABILITATION

## 2023-03-06 PROCEDURE — 3008F BODY MASS INDEX DOCD: CPT | Performed by: PHYSICAL MEDICINE & REHABILITATION

## 2023-03-06 PROCEDURE — 3079F DIAST BP 80-89 MM HG: CPT | Performed by: PHYSICAL MEDICINE & REHABILITATION

## 2023-03-06 NOTE — PATIENT INSTRUCTIONS
1) My office will call you to schedule the Caudal ELVIRA under local anesthesia once the procedure is approved by your insurance carrier. 2) Taper off of Lyrica as it has not been helpful at 150mg twice per day. Take 1 tablet twice per day for the next week and then 1 tablet daily for 1 week  3) Continue home exercise program  4) Tylenol 500-1000 mg every 6-8 hours as needed for pain. No more than 3000 mg daily. 5) Continue Norco as per Dr. Yury Ibanez.

## 2023-03-07 ENCOUNTER — TELEPHONE (OUTPATIENT)
Dept: PHYSICAL MEDICINE AND REHAB | Facility: CLINIC | Age: 48
End: 2023-03-07

## 2023-03-07 NOTE — TELEPHONE ENCOUNTER
Initiated authorization for Caudal ELVIRA CPT [id number] dx:M48.061 to be done at [facility] with [facility]  Status: Approved valid 3/7/23-6/5/23    Notification or Prior Authorization is not required for the requested services    This [facility] [employer] plan does not currently require a prior authorization for these services. If you have general questions about the prior authorization requirements, please call us at [phone] or visit [employer] > Clinician Resources > Advance and Admission Notification Requirements. The number above acknowledges your notification. Please write this number down for future reference. Notification is not a guarantee of coverage or payment.     Decision ID #:[id number]    Liane Santana at [facility] who confirmed authorization is not required reference #[id number]

## 2023-03-08 NOTE — TELEPHONE ENCOUNTER
Patient has been scheduled for Caudal ELVIRA  on 03/22/23 at the [facility] with Dr.BEHAR. -Anesthesia type: LOCAL. -If scheduling [facility] covid testing required for all procedures whether patient is vaccinated or not. -Patient informed not to eat or drink anything after midnight the night prior to the procedure, if being sedated. (Patient informed to fast 12 hours prior to procedure with IVCS/MAC. )  -Patient was advised that if he/she does receive the covid vaccine it needs to be at least 2 weeks before or after the injection. -Medications and allergies reviewed. -Patient reminded to hold NSAIDs (Ibuprofen, ASA 81, Aleve, Naproxen, Mobic, Diclofenac, Etodolac, Celebrex etc.) for 3 days prior to [facility]  if BMI is greater than 35. For Cervical injections only hold multivitamins, Vitamin E, Fish Oil, Phentermine (Lomaira) for 7 days prior to injection and NSAIDS.  mg to be held for 7 days prior to injections.  -If patient is receiving MAC/IVCS Phentermine Jen Day) will need to be held for 7 days prior to injection.  -If on blood thinner clearance has been received to hold this medication by provider.   -Patient informed he/she will need a  to and from procedure. -[facility] is located in the [facility] 1st floor. Patient may park in the yellow or purple parking. Patient verbalized understanding and agrees with plan.  -----> Scheduled in Epic: Yes  -----> Scheduled in [employer]:  Yes

## 2023-03-22 ENCOUNTER — OFFICE VISIT (OUTPATIENT)
Dept: SURGERY | Facility: CLINIC | Age: 48
End: 2023-03-22
Payer: COMMERCIAL

## 2023-03-22 DIAGNOSIS — M54.59 MECHANICAL LOW BACK PAIN: ICD-10-CM

## 2023-03-22 DIAGNOSIS — M51.36 BULGE OF LUMBAR DISC WITHOUT MYELOPATHY: ICD-10-CM

## 2023-03-22 DIAGNOSIS — M51.37 DDD (DEGENERATIVE DISC DISEASE), LUMBOSACRAL: ICD-10-CM

## 2023-03-22 DIAGNOSIS — M48.061 LUMBAR FORAMINAL STENOSIS: Primary | ICD-10-CM

## 2023-03-22 DIAGNOSIS — M54.16 LUMBAR RADICULOPATHY: ICD-10-CM

## 2023-03-22 DIAGNOSIS — M47.816 LUMBAR SPONDYLOSIS: ICD-10-CM

## 2023-03-22 DIAGNOSIS — M47.816 FACET SYNDROME, LUMBAR: ICD-10-CM

## 2023-03-22 PROCEDURE — 62323 NJX INTERLAMINAR LMBR/SAC: CPT | Performed by: PHYSICAL MEDICINE & REHABILITATION

## 2023-03-22 NOTE — PROCEDURES
[facility]    Caudal Epidural Steroid Injection  NAME:  Campos Mcleod II    MR #:    [id number] :  1975     PHYSICIAN:  Darline Crowe MD        Operative Report    DATE OF PROCEDURE: 3/22/2023   PREOPERATIVE DIAGNOSES: 1. Lumbar foraminal stenosis    2. DDD (degenerative disc disease), lumbosacral    3. Mechanical low back pain    4. Lumbar radiculopathy    5. Lumbar spondylosis    6. Facet syndrome, lumbar    7. Bulge of lumbar disc without myelopathy        POSTOPERATIVE DIAGNOSES:   1. Lumbar foraminal stenosis    2. DDD (degenerative disc disease), lumbosacral    3. Mechanical low back pain    4. Lumbar radiculopathy    5. Lumbar spondylosis    6. Facet syndrome, lumbar    7. Bulge of lumbar disc without myelopathy        PROCEDURES: Caudal epidural steroid injection done under fluoroscopic guidance with contrast enhancement. SURGEON: Darline Crowe MD   ANESTHESIA: Local   INDICATIONS:      OPERATIVE PROCEDURE:  The patient was consented. He was brought into the operating room suite and placed on the fluoroscopy table in the prone position. He was sterilely prepped and draped in routine fashion. The sacral hiatus was identified. The overlying skin was anesthetized. A 22-gauge spinal needle was introduced into the epidural space. Needle placement was verified using fluoroscopy and radiographic contrast showing an epidurogram.  There was no withdrawal of blood of CSF from the needle. Radiographic interpretation was that the needle was in proper position for a caudal epidural steroid injection. I placed a mixture of 3mL of 6mg/mL Celestone and 12mL of 1% preservative-free lidocaine into the epidural space. The patient tolerated the procedure well without any immediate complications. Garo Bazan.  Norma Adame MD, [address] [place] [address]  Physical Medicine and Rehabilitation/Sports Medicine  MEDICAL [facility]

## 2023-03-22 NOTE — PATIENT INSTRUCTIONS
Post Injection Instructions     Please do not do anything strenuous over the next 24 hours (if you had a knee injection do not walk more than 2 city blocks, do not attend any aerobic classes, do not run, no heavy lifting, no prolong standing). You may resume your day to day activities after your injection. You may experience some mild amount of swelling and discomfort after the procedure. Please ice the area that was injected at least 5-6 times a day (15 minutes) for two days after (this will help prevent worsening pain that sometimes occurs after an injection). Only take tylenol if needed for pain for the first few days. Watch for signs of infection which include redness, warmth, worsening pain, fevers or chills. If you develop any of these signs call the office immediately at [phone]    My office will call you in 2 days to check in and see how you are feeling. Follow up with me in the office 2 weeks after your injection. Everyone responds differently to injections, but you can expect your peak effects a few weeks after your last injection. Mandy Syed.  Amari Duarte MD  Physical Medicine and Rehabilitation/Sports Medicine  MEDICAL CENTER [facility]

## 2023-04-07 ENCOUNTER — TELEMEDICINE (OUTPATIENT)
Dept: PHYSICAL MEDICINE AND REHAB | Facility: CLINIC | Age: 48
End: 2023-04-07
Payer: COMMERCIAL

## 2023-04-07 DIAGNOSIS — M99.9 BIOMECHANICAL LESION: ICD-10-CM

## 2023-04-07 DIAGNOSIS — M54.59 LUMBAR TRIGGER POINT SYNDROME: ICD-10-CM

## 2023-04-07 DIAGNOSIS — M47.816 FACET SYNDROME, LUMBAR: ICD-10-CM

## 2023-04-07 DIAGNOSIS — M51.16 LUMBAR DISC HERNIATION WITH RADICULOPATHY: ICD-10-CM

## 2023-04-07 DIAGNOSIS — M48.061 LUMBAR FORAMINAL STENOSIS: ICD-10-CM

## 2023-04-07 DIAGNOSIS — M54.59 MECHANICAL LOW BACK PAIN: ICD-10-CM

## 2023-04-07 DIAGNOSIS — M79.10 MYALGIA: ICD-10-CM

## 2023-04-07 DIAGNOSIS — M47.816 LUMBAR SPONDYLOSIS: Primary | ICD-10-CM

## 2023-04-07 DIAGNOSIS — M51.37 DDD (DEGENERATIVE DISC DISEASE), LUMBOSACRAL: ICD-10-CM

## 2023-04-07 DIAGNOSIS — I10 ESSENTIAL HYPERTENSION: ICD-10-CM

## 2023-04-07 DIAGNOSIS — M51.36 BULGE OF LUMBAR DISC WITHOUT MYELOPATHY: ICD-10-CM

## 2023-04-07 PROCEDURE — 99214 OFFICE O/P EST MOD 30 MIN: CPT | Performed by: PHYSICAL MEDICINE & REHABILITATION

## 2023-04-07 NOTE — PATIENT INSTRUCTIONS
1) My office will call you to schedule the BILATERAL L4-L5 and L5-S1 facet joint injections under local anesthesia once the procedure is approved by your insurance carrier. 2) continue Gabapentin 200 mg three times per day, tizanidine, and Norco as needed for pain.

## 2023-04-10 ENCOUNTER — TELEPHONE (OUTPATIENT)
Dept: PHYSICAL MEDICINE AND REHAB | Facility: CLINIC | Age: 48
End: 2023-04-10

## 2023-04-10 NOTE — TELEPHONE ENCOUNTER
LMTCB 1st attempt    Procedure: Bilateral L4-5 and L5-S1 Facet joint injection  Anesthesia: Local  Dx: S79.683  Location: [facility]  CPT Codes: Z2922300, [id number]

## 2023-04-10 NOTE — TELEPHONE ENCOUNTER
Patient has been scheduled for  BILATERAL L4-L5 and L5-S1 facet joint injections on 4/12/2023 at the [facility] with Dr.Behar.   -Anesthesia type: local.  -If scheduling [facility] covid testing required for all procedures whether patient is vaccinated or not. -Patient informed not to eat or drink anything after midnight the night prior to the procedure, if being sedated. (Patient informed to fast 12 hours prior to procedure with IVCS/MAC. )  -Patient was advised that if he/she does receive the covid vaccine it needs to be at least 2 weeks before or after the injection. -Medications and allergies reviewed. Yes  -Patient reminded to hold NSAIDs (Ibuprofen, ASA 81, Aleve, Naproxen, Mobic, Diclofenac, Etodolac, Celebrex etc.) for 3 days prior to [facility]  if BMI is greater than 35. For Cervical injections only hold multivitamins, Vitamin E, Fish Oil, Phentermine (Lomaira) for 7 days prior to injection and NSAIDS.  mg to be held for 7 days prior to injections.  -If patient is receiving MAC/IVCS Phentermine Maxene Gamez) will need to be held for 7 days prior to injection. N/A  -N/A If on blood thinner clearance has been received to hold this medication by provider.   -Patient informed he/she will need a  to and from procedure. Yes  -[facility] is located in the [facility] 1st floor. Patient may park in the yellow or purple parking. Patient verbalized understanding and agrees with plan.  -----> Scheduled in Epic: Yes  -----> Scheduled in [employer]:  Yes

## 2023-04-10 NOTE — TELEPHONE ENCOUNTER
Initiated authorization for BILATERAL L4-L5 and L5-S1 facet joint injections CPT [id number], 24349W1 dx:M47.816 to be done at [facility] with [facility]  Status: Approved valid 4/10/23-7/9/23  Notification or Prior Authorization is not required for the requested services    This [facility] [employer] plan does not currently require a prior authorization for these services. If you have general questions about the prior authorization requirements, please call us at [phone] or visit [employer] > Clinician Resources > Advance and Admission Notification Requirements. The number above acknowledges your notification. Please write this number down for future reference. Notification is not a guarantee of coverage or payment.     Decision ID #:[id number]

## 2023-05-03 ENCOUNTER — OFFICE VISIT (OUTPATIENT)
Dept: SURGERY | Facility: CLINIC | Age: 48
End: 2023-05-03
Payer: COMMERCIAL

## 2023-05-03 DIAGNOSIS — M47.816 LUMBAR SPONDYLOSIS: ICD-10-CM

## 2023-05-03 DIAGNOSIS — M51.37 DDD (DEGENERATIVE DISC DISEASE), LUMBOSACRAL: ICD-10-CM

## 2023-05-03 DIAGNOSIS — M51.36 BULGE OF LUMBAR DISC WITHOUT MYELOPATHY: ICD-10-CM

## 2023-05-03 DIAGNOSIS — M48.061 LUMBAR FORAMINAL STENOSIS: Primary | ICD-10-CM

## 2023-05-03 DIAGNOSIS — M47.816 FACET SYNDROME, LUMBAR: ICD-10-CM

## 2023-05-03 DIAGNOSIS — M54.59 MECHANICAL LOW BACK PAIN: ICD-10-CM

## 2023-05-03 PROCEDURE — 64493 INJ PARAVERT F JNT L/S 1 LEV: CPT | Performed by: PHYSICAL MEDICINE & REHABILITATION

## 2023-05-03 PROCEDURE — 64494 INJ PARAVERT F JNT L/S 2 LEV: CPT | Performed by: PHYSICAL MEDICINE & REHABILITATION

## 2023-05-03 NOTE — PATIENT INSTRUCTIONS
Post Injection Instructions     Please do not do anything strenuous over the next two days (if you had a knee injection do not walk more than 2 city blocks, do not attend any aerobic classes, do not run, no heavy lifting, no prolong standing). You may resume your day to day activities after your injection. You may experience some mild amount of swelling after the procedure. Please ice your joint that was injected at least 5-6 times a day (15 minutes) for two days after (this will help prevent worsening pain that sometimes occurs after an injection). Only take tylenol if needed for pain for the first few days. Watch for signs of infection which include redness, warmth, worsening pain, fevers or chills. If you develop any of these signs call the office immediately at [phone]    Everyone responds differently to injections, but you can expect your peak effects a few weeks after your last injection. Telferner Anselmo.  Erna Mohan MD  Physical Medicine and Rehabilitation/Sports Medicine  MEDICAL CENTER [facility]

## 2023-05-03 NOTE — PROCEDURES
[facility] Z-JOINT/FACET INJECTIONS  NAME:  Devan Mendes II    MR #:    [id number] :  1975     PHYSICIAN:  Vasquez Kay MD        Operative Report    DATE OF PROCEDURE: 5/3/2023   PREOPERATIVE DIAGNOSES: 1. Lumbar foraminal stenosis    2. Lumbar spondylosis    3. DDD (degenerative disc disease), lumbosacral    4. Facet syndrome, lumbar    5. Mechanical low back pain    6. Bulge of lumbar disc without myelopathy        POSTOPERATIVE DIAGNOSES:   1. Lumbar foraminal stenosis    2. Lumbar spondylosis    3. DDD (degenerative disc disease), lumbosacral    4. Facet syndrome, lumbar    5. Mechanical low back pain    6. Bulge of lumbar disc without myelopathy        PROCEDURES: Bilateral L4-5 and L5-S1 Z-joint/facet injection done under fluoroscopic guidance with contrast enhancement. SURGEON: Vasquez Kay MD   ANESTHESIA: Local   INDICATIONS:      OPERATIVE PROCEDURE:  Written consent was obtained from the patient. The patient was brought into the operating room and placed in the prone position on the fluoroscopy table with a pillow underneath the abdomen. The patient's skin was cleaned and draped in a normal sterile fashion. Using AP fluoroscopy, all five lumbar vertebrae were identified. Then the Bilateral L4-5 and L5-S1 z-joints were identified on AP view. At this point in time, the patient's skin was anesthetized with 1 to 2 cc of 1% PF lidocaine without epinephrine over each joint site. Then, 3.5 inch, 22-gauge spinal needles were inserted and directed towards the Bilateral L4-5 and L5-S1 z-joints . When it felt to be in good position, Bilateral anterior oblique fluoroscopy was used to advance the needle into the correct z-joint. At this point in time, Omnipaque-240 contrast was used to obtain a good athrogram indicating correct needle placement. Then, aspiration was performed.   No blood, fluid, or air was aspirated and each joint was injected with a 1 cc solution of 1/2 cc of 40 mg/cc of Kenalog and 1/2 cc of 1% PF lidocaine without epinephrine. After this, the needles were removed. The patient's skin was cleaned. Band-Aids were applied. The patient was transferred to the cart and into [facility]. The patient was given discharge instructions and will follow up in 2 weeks at our clinic. Throughout the whole procedure, the patient's pulse oximetry and vital signs were monitored and they remained completely stable. Also, throughout the whole procedure, prior to injection of any medication, aspiration was performed. No blood, fluid, or air was aspirated at anytime. [place].  Erna Mohan MD, [address] [facility]  Physical Medicine and Rehabilitation/Sports Medicine  MEDICAL [facility]

## 2023-07-07 ENCOUNTER — TELEMEDICINE (OUTPATIENT)
Dept: PHYSICAL MEDICINE AND REHAB | Facility: CLINIC | Age: 48
End: 2023-07-07
Payer: COMMERCIAL

## 2023-07-07 DIAGNOSIS — M47.816 FACET SYNDROME, LUMBAR: ICD-10-CM

## 2023-07-07 DIAGNOSIS — M51.36 BULGE OF LUMBAR DISC WITHOUT MYELOPATHY: ICD-10-CM

## 2023-07-07 DIAGNOSIS — M54.59 MECHANICAL LOW BACK PAIN: ICD-10-CM

## 2023-07-07 DIAGNOSIS — K21.00 GASTROESOPHAGEAL REFLUX DISEASE WITH ESOPHAGITIS WITHOUT HEMORRHAGE: ICD-10-CM

## 2023-07-07 DIAGNOSIS — M48.061 LUMBAR FORAMINAL STENOSIS: ICD-10-CM

## 2023-07-07 DIAGNOSIS — M99.9 BIOMECHANICAL LESION: ICD-10-CM

## 2023-07-07 DIAGNOSIS — M54.59 LUMBAR TRIGGER POINT SYNDROME: ICD-10-CM

## 2023-07-07 DIAGNOSIS — M47.816 LUMBAR SPONDYLOSIS: Primary | ICD-10-CM

## 2023-07-07 DIAGNOSIS — M51.16 LUMBAR DISC HERNIATION WITH RADICULOPATHY: ICD-10-CM

## 2023-07-07 DIAGNOSIS — M79.10 MYALGIA: ICD-10-CM

## 2023-07-07 DIAGNOSIS — S76.912A STRAIN OF LEFT ILIOPSOAS MUSCLE, INITIAL ENCOUNTER: ICD-10-CM

## 2023-07-07 DIAGNOSIS — I10 ESSENTIAL HYPERTENSION: ICD-10-CM

## 2023-07-07 DIAGNOSIS — M51.37 DDD (DEGENERATIVE DISC DISEASE), LUMBOSACRAL: ICD-10-CM

## 2023-07-07 PROCEDURE — 99214 OFFICE O/P EST MOD 30 MIN: CPT | Performed by: PHYSICAL MEDICINE & REHABILITATION

## 2023-07-07 NOTE — PROGRESS NOTES
[address]  Video Visit Progress Note      Telehealth outside of [address] Verbal Consent   I conducted a telehealth visit with Isamar Harrison II today, 07/07/23, which was completed using two-way, real-time interactive audio and video communication. This has been done in good gloria to provide continuity of care in the best interest of the provider-patient relationship, due to the COVID -19 public health crisis/national emergency where restrictions of face-to-face office visits are ongoing. Every conscious effort was taken to allow for sufficient and adequate time to complete the visit. The patient was made aware of the limitations of the telehealth visit, including treatment limitations as no physical exam could be performed. The patient was advised to call 911 or to go to the ER in case there was an emergency. The patient was also advised of the potential privacy & security concerns related to the telehealth platform. The patient was made aware of where to find [facility] notice of privacy practices, telehealth consent form and other related consent forms and documents. which are located on the [facility] website. The patient verbally agreed to telehealth consent form, related consents and the risks discussed. Lastly, the patient confirmed that they were in [place]. Included in this visit, time may have been spent reviewing labs, medications, radiology tests and decision making. Appropriate medical decision-making and tests are ordered as detailed in the plan of care above. Coding/billing information is submitted for this visit based on complexity of care and/or time spent for the visit. CHIEF COMPLAINT:  Patient presents with: Injection  Low Back Pain      History of Present Illness:   The patient is a 50year old LEFT handed male with significant past medical history of SIBO, gastroenteritis, mariluz's esophagus  who presents for follow up of their low back pain with radiation down both legs to the bilateral gastrocs. He is status post caudal epidural steroid injection on 3/22/2023. He felt that to be tremendously helpful for the radicular symptoms. He is also status post bilateral L4-L5 and L5-S1 facet joint injections on 5/3/2023 as he was having axial low back pain. He felt 100% improvement that lasted for about 1 month. He was able to travel to [facility] and go mountain hiking without discomfort. About 6 weeks ago, he developed a sharp electrical pain in the low back bilaterally with radiation down the left leg. Ian Rebollarff He rates his low back pain a 5-6/10. He has been having [place] refills through Dr. Janie Ryan office. He is trying to taper off of the Norco through Dr. Stephanie Amezquita office    PAST MEDICAL HISTORY:  Past Medical History:   Diagnosis Date    Anxiety state     Back problem     degenerative disc disease    Depression     Esophageal reflux     History of blood transfusion 1992    with emergency appy       SURGICAL HISTORY:  Past Surgical History:   Procedure Laterality Date    APPENDECTOMY  1992    COLECTOMY      COLONOSCOPY  09/03/2021    LAPAROSCOPIC CHOLECYSTECTOMY      OTHER      peritonitis r/t ruptured appendix -  week later 9 inches colon removed       SOCIAL HISTORY:   Social History    Occupational History      Not on file    Tobacco Use      Smoking status: Never      Smokeless tobacco: Never    Vaping Use      Vaping Use: Never used    Substance and Sexual Activity      Alcohol use: Never      Drug use: Never      Sexual activity: Not on file      FAMILY HISTORY:   Family History   Problem Relation Age of Onset    Other (Other) Father         brain trauma    Cancer Mother         breast       CURRENT MEDICATIONS:   Current Outpatient Medications   Medication Sig Dispense Refill    dicyclomine 10 MG Oral Cap Take 1 capsule (10 mg total) by mouth 4 (four) times daily before meals and nightly.  120 capsule 1    tiZANidine 4 MG Oral Tab Take 1 tablet (4 mg total) by mouth 2 (two) times daily as needed. 60 tablet 1    HYDROcodone-acetaminophen (NORCO) 7.5-325 MG Oral Tab Take 1 tablet by mouth every 6 (six) hours as needed for Pain. 50 tablet 0    methylPREDNISolone (MEDROL) 4 MG Oral Tablet Therapy Pack As directed. 21 each 0    escitalopram 20 MG Oral Tab Take 1 tablet (20 mg total) by mouth daily. 90 tablet 1    Tadalafil 20 MG Oral Tab Take 1 tablet (20 mg total) by mouth daily as needed for Erectile Dysfunction. 30 tablet 1    hydrOXYzine 25 MG Oral Tab Take 1 tablet (25 mg total) by mouth nightly as needed. 15 tablet 0    pregabalin 100 MG Oral Cap Take 1 capsule (100 mg total) by mouth 2 (two) times daily. 60 capsule 1    PANTOPRAZOLE 40 MG Oral Tab EC Take 1 tablet (40 mg total) by mouth every morning before breakfast. 30 tablet 2    Rizatriptan Benzoate 10 MG Oral Tab Take 1 tablet (10 mg total) by mouth as needed for Migraine. 10 tablet 3       ALLERGIES:     Mold                    OTHER (SEE COMMENTS)    Comment:Note: congestion  Nuts                    OTHER (SEE COMMENTS)    Comment:Note: Walnuts: itchy and congestion  Other                   OTHER (SEE COMMENTS)    Comment:Note: congestion  Pollen                  OTHER (SEE COMMENTS)    Comment:Note: congestion  Seasonal                OTHER (SEE COMMENTS)    Comment:Note: congestion  Watermelon              OTHER (SEE COMMENTS)    Comment:Note: itchy and congestion  Theophylline            ITCHING, OTHER (SEE COMMENTS)    REVIEW OF SYSTEMS:   Review of Systems   Constitutional: Negative. HENT: Negative. Eyes: Negative. Respiratory: Negative. Cardiovascular: Negative. Gastrointestinal: Negative. Genitourinary: Negative. Musculoskeletal: As per HPI  Skin: Negative. Neurological: As per HPI  Endo/Heme/Allergies: Negative. Psychiatric/Behavioral: Negative. All other systems reviewed and are negative. Pertinent positives and negatives noted in the HPI.         PHYSICAL EXAM:     There is no height or weight on file to calculate BMI.     General: No immediate distress  Head: Normocephalic/ Atraumatic  Eyes: Extra-occular movements intact  Ears/Nose/Throat:  External appearance identifies normal appearance without obvious deformity  Cardiovascular: No cyanosis, clubbing or edema  Respiratory: Non-labored respirations  Skin: No lesions noted   Neurological: alert & oriented x 3, attentive, able to follow commands, comprehention intact, spontaneous speech intact  Psychiatric: Mood and affect appropriate        Data  Office Visit on 01/27/2023   Component Date Value Ref Range Status    TESTOSTERONE,TOTAL,MALES 01/27/2023 279  250 - 827 ng/dL Final    WHITE BLOOD CELL COUNT 01/27/2023 5.9  3.8 - 10.8 Thousand/uL Final    RED BLOOD CELL COUNT 01/27/2023 5.08  4.20 - 5.80 Million/uL Final    HEMOGLOBIN 01/27/2023 15.3  13.2 - 17.1 g/dL Final    HEMATOCRIT 01/27/2023 44.9  38.5 - 50.0 % Final    MCV 01/27/2023 88.4  80.0 - 100.0 fL Final    MCH 01/27/2023 30.1  27.0 - 33.0 pg Final    MCHC 01/27/2023 34.1  32.0 - 36.0 g/dL Final    RDW 01/27/2023 12.3  11.0 - 15.0 % Final    PLATELET COUNT 63/10/8933 325  140 - 400 Thousand/uL Final    MPV 01/27/2023 9.1  7.5 - 12.5 fL Final    ABSOLUTE NEUTROPHILS 01/27/2023 2,832  1,500 - 7,800 cells/uL Final    ABSOLUTE BAND NEUTROPHILS 01/27/2023 CANCELED  0 - 750 cells/uL Final    ABSOLUTE METAMYELOCYTES 01/27/2023 CANCELED  0 cells/uL Final    ABSOLUTE MYELOCYTES 01/27/2023 CANCELED  0 cells/uL Final    ABSOLUTE PROMYELOCYTES 01/27/2023 CANCELED  0 cells/uL Final    ABSOLUTE LYMPHOCYTES 01/27/2023 2,454  850 - 3,900 cells/uL Final    ABSOLUTE MONOCYTES 01/27/2023 413  200 - 950 cells/uL Final    ABSOLUTE EOSINOPHILS 01/27/2023 130  15 - 500 cells/uL Final    ABSOLUTE BASOPHILS 01/27/2023 71  0 - 200 cells/uL Final    ABSOLUTE BLASTS 01/27/2023 CANCELED  0 cells/uL Final    ABSOLUTE NUCLEATED RBC 01/27/2023 CANCELED  0 cells/uL Final    NEUTROPHILS 01/27/2023 48  % Final    BAND NEUTROPHILS 01/27/2023 CANCELED  % Final    METAMYELOCYTES 01/27/2023 CANCELED  % Final    MYELOCYTES 01/27/2023 CANCELED  % Final    PROMYELOCYTES 01/27/2023 CANCELED  % Final    LYMPHOCYTES 01/27/2023 41.6  % Final    REACTIVE LYMPHOCYTES 01/27/2023 CANCELED  0 - 10 % Final    MONOCYTES 01/27/2023 7.0  % Final    EOSINOPHILS 01/27/2023 2.2  % Final    BASOPHILS 01/27/2023 1.2  % Final    BLASTS 01/27/2023 CANCELED  % Final    NUCLEATED RBC 01/27/2023 CANCELED  0 /100 WBC Final    COMMENT(S) 01/27/2023 CANCELED   Final    GLUCOSE 01/27/2023 95  65 - 99 mg/dL Final    UREA NITROGEN (BUN) 01/27/2023 16  7 - 25 mg/dL Final    CREATININE 01/27/2023 0.99  0.60 - 1.29 mg/dL Final    EGFR 01/27/2023 95  > OR = 60 mL/min/1.73m2 Final    BUN/CREATININE RATIO 29/64/6622 NOT APPLICABLE  6 - 22 (calc) Final    SODIUM 01/27/2023 141  135 - 146 mmol/L Final    POTASSIUM 01/27/2023 4.4  3.5 - 5.3 mmol/L Final    CHLORIDE 01/27/2023 105  98 - 110 mmol/L Final    CARBON DIOXIDE 01/27/2023 28  20 - 32 mmol/L Final    CALCIUM 01/27/2023 9.8  8.6 - 10.3 mg/dL Final    PROTEIN, TOTAL 01/27/2023 7.2  6.1 - 8.1 g/dL Final    ALBUMIN 01/27/2023 4.9  3.6 - 5.1 g/dL Final    GLOBULIN 01/27/2023 2.3  1.9 - 3.7 g/dL (calc) Final    ALBUMIN/GLOBULIN RATIO 01/27/2023 2.1  1.0 - 2.5 (calc) Final    BILIRUBIN, TOTAL 01/27/2023 0.5  0.2 - 1.2 mg/dL Final    ALKALINE PHOSPHATASE 01/27/2023 63  36 - 130 U/L Final    AST 01/27/2023 19  10 - 40 U/L Final    ALT 01/27/2023 15  9 - 46 U/L Final    CHOLESTEROL, TOTAL 01/27/2023 211 (H)  <200 mg/dL Final    HDL CHOLESTEROL 01/27/2023 38 (L)  > OR = 40 mg/dL Final    TRIGLYCERIDES 01/27/2023 186 (H)  <150 mg/dL Final    LDL-CHOLESTEROL 01/27/2023 141 (H)  mg/dL (calc) Final    CHOL/HDLC RATIO 01/27/2023 5.6 (H)  <5.0 (calc) Final    NON-HDL CHOLESTEROL 01/27/2023 173 (H)  <130 mg/dL (calc) Final    PSA, TOTAL 01/27/2023 0.45  < OR = 4.00 ng/mL Final    VITAMIN D, 25-OH, TOTAL 01/27/2023 29 (L)  30 - 100 ng/mL Final   ]      Radiology Imaging:  I reviewed with the patient his MRI of the lumbar spine from 7/20/22  MRI SPINE LUMBAR (CPT=72148)  Narrative: PROCEDURE:  MRI SPINE LUMBAR (CPT=72148)     LOCATION:                                           COMPARISON:  None. INDICATIONS:  J54.484F Strain of left iliopsoas muscle, initial encounter K21.00 Gastroesophageal reflux disease with esophagitis without hemorrhage M51.26 Bulge of lumbar d*     TECHNIQUE:  Multiplanar T1 and T2 weighted images including fat suppression sequences. Images acquired in sagittal and axial planes. PATIENT STATED HISTORY: (As transcribed by Technologist)  Chronic low back pain for many years. FINDINGS:    LUMBAR DISC LEVELS  L1-L2:  No significant disc/facet abnormality, spinal stenosis, or foraminal narrowing. L2-L3:  Minimal diffuse disc bulge is noted without significant stenosis of central canal or foramina. L3-L4:  There is diffuse disc bulge, endplate osteophyte and facet hypertrophy. There is mild to moderate central canal stenosis. There is moderate bilateral subarticular stenosis and mild to moderate bilateral foraminal stenosis. L4-L5:  There is diffuse disc bulge and moderate facet hypertrophy. There is mild central canal stenosis. There is moderate bilateral subarticular stenosis and mild to moderate bilateral foraminal stenosis. L5-S1:  There is diffuse disc bulge, endplate osteophyte and facet hypertrophy. There is a left parasagittal focal broad-based disc protrusion which abuts the descending left S1 nerve root. There is overall moderate left subarticular stenosis. There   is moderate bilateral foraminal stenosis. There is no significant central canal stenosis. PARASPINAL AREA:  MRI signal in the psoas muscles is normal bilaterally. There is no evidence of muscular tear or strain. BONY STRUCTURES:  There is Modic type 2 degenerative endplate change at R0-U1. Marrow signal at other levels is normal.  CORD/CAUDA EQUINA:  Normal caliber, contour, and signal intensity. Impression: CONCLUSION:    1. Multilevel degenerative disc disease and degenerative change of facets is described in detail level by level above. 2. There is soft disc protrusion left parasagittal L5-S1 level which abuts the descending left S1 nerve root. Dictated by (CST): Latisha Rowley MD on 7/21/2022 at 7:56 AM       Finalized by (CST): Latisha Rowley MD on 7/21/2022 at 8:01 AM         ASSESSMENT AND PLAN:  Judi Ibarra is a pleasant 75-year-old male who presents for follow-up of his bilateral low back pain with radiation down the left leg. I am recommending a left L5 and left S1 transforaminal epidural steroid injection under local anesthesia. He should continue tapering down off of the Norco.  He should continue his home exercise program and follow-up with me 2 weeks after the procedure. RTC in 2 weeks after procedure  Discharge Instructions were provided as documented in AVS summary. The patient was in agreement with the assessment and plan. All questions were answered. There were no barriers to learning. We discussed that a telemedicine visit is in place of an office visit; however, this limits the ability to perform a thorough physical examination which may affect objective findings related to a specific condition and can affect treatment. We also discussed that NSAIDs may mask or worsen COVID-19 infection symptoms. The patient was also informed that corticosteroids, in any form, may significantly decrease immune response and may increase risk and complications of infection. The patient was advised that given the current situation with COVID-19, it is in his/her best interest to socially distance his/herself.  Given this, we are not recommending any elective procedures or office visits at the outpatient surgery center or in the office respectively unless deemed necessary. My staff will be reaching out to the patient for the elective procedure when it is considered appropriate and the patient can follow-up in office as well when appropriate. In the meantime, I will be available for telephone and video encounter. Lumbar spondylosis  (primary encounter diagnosis)  Mechanical low back pain  Bulge of lumbar disc without myelopathy  Facet syndrome, lumbar  Essential hypertension  Biomechanical lesion  Lumbar disc herniation with radiculopathy  Lumbar foraminal stenosis  Myalgia  Lumbar trigger point syndrome  DDD (degenerative disc disease), lumbosacral  Strain of left iliopsoas muscle, initial encounter  Gastroesophageal reflux disease with esophagitis without hemorrhage    Alex Ann MD  Physical Medicine and Rehabilitation/Sports Medicine  MEDICAL CENTER [facility]

## 2023-07-07 NOTE — PATIENT INSTRUCTIONS
1) My office will call you to schedule the LEFT l5 and LEFt S1 TFESI under local anesthesia once the procedure is approved by your insurance carrier. 2) Continue Norco tapering as per Dr. Sigrid Perkins office  3) Continue home exercise program  4) Follow up with me 2 weeks after the procedure. This can be in the office or virtually.

## 2023-07-10 ENCOUNTER — TELEPHONE (OUTPATIENT)
Dept: PHYSICAL MEDICINE AND REHAB | Facility: CLINIC | Age: 48
End: 2023-07-10

## 2023-07-10 NOTE — TELEPHONE ENCOUNTER
Initiated authorization for LEFT L5 and LEFT S1 TFESIs CPT [id number], [id number] dx:M51.16 to be done at [facility] with [facility]  Case #[id number].   [facility] requested clinicals-clinicals faxed/uploaded to [phone]  Status: pending

## 2023-07-26 NOTE — TELEPHONE ENCOUNTER
Patient has been scheduled for  Left L5 + S1 transforaminal epidural steroid injection  on 8/9/23 at the [facility] with Dr. Quinton Moran.   -Anesthesia type: Local.  -Patient informed to fast 12 hours prior to procedure with IVCS/MAC.   -Scheduling [address] covid testing required for all procedures whether patient is vaccinated or not. -Patient was advised that if he/she does receive the covid vaccine it needs to be at least 2 weeks before or after the injection. -Medications and allergies reviewed. -Patient reminded to hold NSAIDs (Ibuprofen, ASA 81, Aleve, Naproxen, Mobic, Diclofenac, Etodolac, Celebrex etc.) for 3 days prior to Lumbar MBB/Facet if BMI is greater than 35. For Cervical injections only hold multivitamins, Vitamin E, Fish Oil, Phentermine/Lomaira for 7 days prior to injection and NSAIDS.  mg to be held for 7 days prior to injections.  -If patient is receiving MAC/IVCS Phentermine Levell Marus) will need to be held for 7 days prior to injection.  -If on blood thinner clearance has been received to hold this medication by provider.   -Patient informed he/she will need a  to and from procedure. Kala Valdez is located in the [facility] [address] 1st floor,  may park in the yellow/purple parking lot.     Patient verbalized understanding and agrees with plan.  -----> Scheduled in Epic: Yes  -----> Scheduled in [employer]: Yes  [x] Follow up appointment made

## 2023-08-09 ENCOUNTER — OFFICE VISIT (OUTPATIENT)
Dept: SURGERY | Facility: CLINIC | Age: 48
End: 2023-08-09
Payer: COMMERCIAL

## 2023-08-09 DIAGNOSIS — M47.816 LUMBAR SPONDYLOSIS: ICD-10-CM

## 2023-08-09 DIAGNOSIS — M54.16 LUMBAR RADICULOPATHY: ICD-10-CM

## 2023-08-09 DIAGNOSIS — M51.37 DDD (DEGENERATIVE DISC DISEASE), LUMBOSACRAL: ICD-10-CM

## 2023-08-09 DIAGNOSIS — M54.59 MECHANICAL LOW BACK PAIN: ICD-10-CM

## 2023-08-09 DIAGNOSIS — M51.36 BULGE OF LUMBAR DISC WITHOUT MYELOPATHY: ICD-10-CM

## 2023-08-09 DIAGNOSIS — M48.061 LUMBAR FORAMINAL STENOSIS: Primary | ICD-10-CM

## 2023-08-09 DIAGNOSIS — M47.816 FACET SYNDROME, LUMBAR: ICD-10-CM

## 2023-08-09 PROCEDURE — 64483 NJX AA&/STRD TFRM EPI L/S 1: CPT | Performed by: PHYSICAL MEDICINE & REHABILITATION

## 2023-08-09 PROCEDURE — 64484 NJX AA&/STRD TFRM EPI L/S EA: CPT | Performed by: PHYSICAL MEDICINE & REHABILITATION

## 2023-08-09 NOTE — PATIENT INSTRUCTIONS
Post Injection Instructions     Please do not do anything strenuous over the next 24 hours (if you had a knee injection do not walk more than 2 city blocks, do not attend any aerobic classes, do not run, no heavy lifting, no prolong standing). You may resume your day to day activities after your injection. You may experience some mild amount of swelling and discomfort after the procedure. Please ice the area that was injected at least 5-6 times a day (15 minutes) for two days after (this will help prevent worsening pain that sometimes occurs after an injection). Only take tylenol if needed for pain for the first few days. Watch for signs of infection which include redness, warmth, worsening pain, fevers or chills. If you develop any of these signs call the office immediately at [phone]    My office will call you in 2 days to check in and see how you are feeling. Follow up with me in the office 2 weeks after your injection. Everyone responds differently to injections, but you can expect your peak effects a few weeks after your last injection. Faraz Tamayo.  Lizy Colbert MD  Physical Medicine and Rehabilitation/Sports Medicine  MEDICAL CENTER [facility]

## 2023-08-09 NOTE — PROCEDURES
[facility] [address]    2-LEVEL LUMBAR TRANSFORAMINAL   NAME:  Keara Rosario II    MR #:    [id number] :  1975     PHYSICIAN:  Ludwig Jaime MD        Operative Report    DATE OF PROCEDURE: 2023   PREOPERATIVE DIAGNOSES: 1. Lumbar foraminal stenosis    2. Lumbar spondylosis    3. DDD (degenerative disc disease), lumbosacral    4. Mechanical low back pain    5. Lumbar radiculopathy    6. Facet syndrome, lumbar    7. Bulge of lumbar disc without myelopathy        POSTOPERATIVE DIAGNOSES:   1. Lumbar foraminal stenosis    2. Lumbar spondylosis    3. DDD (degenerative disc disease), lumbosacral    4. Mechanical low back pain    5. Lumbar radiculopathy    6. Facet syndrome, lumbar    7. Bulge of lumbar disc without myelopathy        PROCEDURES: Left L5 and S1 transforaminal epidural steroid injections done under fluoroscopic guidance with contrast enhancement. SURGEON: Ludwig Jaime MD   ANESTHESIA: Local   INDICATIONS:      OPERATIVE PROCEDURE:  Written consent was obtained from the patient. The patient was brought into the operating room and placed in the prone position on the fluoroscopy table with pillow underneath the abdomen. The patient's skin was cleaned and draped in a normal sterile fashion. Using AP fluoroscopy, all five lumbar vertebrae were identified. When the Left L5 and S1 vertebrae were identified, fluoroscopy was Left anterior obliqued opening up the Left L5 and S1 intervertebral foramen. At this point in time, each site was anesthetized with 1% PF lidocaine without epinephrine. Then, 5 inch, 22-gauge spinal needles were inserted and directed towards the Left L5 and S1 intervertebral foramen. When they felt to be in good position, AP fluoroscopy was used to advance the needles to the 6 o'clock position on the Left L5 and S1  pedicles. At this point in time, Omnipaque-240 contrast was used to obtain a good epidurogram indicating correct needle placement at each level. Then, aspiration was performed. No blood, fluid, or air was aspirated. Then, the patient was injected with a 4 cc solution of 2cc 6mg/cc of Betamethasone and 2cc of 1% PF lidocaine without epinephrine at each site. After this, the needles were removed. Each site was cleaned. Band-Aids were applied. The patient was transferred to the cart and into [facility]. The patient was given discharge instructions and will follow up in the clinic as scheduled. Throughout the whole procedure, the patient's pulse oximetry and vital signs were monitored and they remained completely stable. Also, throughout the whole procedure, prior to injection of any medication, aspiration was performed. No blood, fluid, or air was aspirated at anytime. Jose Leach.  Emily Zimmer MD, [facility]  Physical Medicine and Rehabilitation/Sports Medicine  [address] [place] [address]

## 2023-08-15 NOTE — TELEPHONE ENCOUNTER
LMTCB 1st attempt    Procedure: Left L5 + S1 transforaminal epidural steroid injection  Anesthesia: Local  Dx: M47.816  Location: [facility]  CPT Codes: [id number], 78131 Xray Femur 2 Views, Right

## 2023-08-18 RX ORDER — PREGABALIN 100 MG/1
100 CAPSULE ORAL 2 TIMES DAILY
Qty: 60 CAPSULE | Refills: 1 | Status: SHIPPED | OUTPATIENT
Start: 2023-08-18

## 2023-08-18 NOTE — TELEPHONE ENCOUNTER
Medication request: Pregabalin 100 mg oral capsule. Take 1 capsule (100 mg total) by mouth 2 (two) times daily. #60. 1 refill.      LOV: 7/7/2023 Televisit ( 8/9/2023 [facility])   Due back to office per last office note:   Return in about 2 weeks (around 8/23/2023) for Injection follow up  NOV: 8/25/2023     [place]: 4/21/2023    Urine drug screen: N/A  Pain Contract: N/A    LOV plan:-

## 2023-08-25 ENCOUNTER — TELEMEDICINE (OUTPATIENT)
Dept: PHYSICAL MEDICINE AND REHAB | Facility: CLINIC | Age: 48
End: 2023-08-25
Payer: COMMERCIAL

## 2023-08-25 DIAGNOSIS — M47.816 FACET SYNDROME, LUMBAR: ICD-10-CM

## 2023-08-25 DIAGNOSIS — M79.10 MYALGIA: ICD-10-CM

## 2023-08-25 DIAGNOSIS — M54.59 MECHANICAL LOW BACK PAIN: ICD-10-CM

## 2023-08-25 DIAGNOSIS — M48.061 LUMBAR FORAMINAL STENOSIS: ICD-10-CM

## 2023-08-25 DIAGNOSIS — M54.59 LUMBAR TRIGGER POINT SYNDROME: ICD-10-CM

## 2023-08-25 DIAGNOSIS — M99.9 BIOMECHANICAL LESION: ICD-10-CM

## 2023-08-25 DIAGNOSIS — M51.16 LUMBAR DISC HERNIATION WITH RADICULOPATHY: ICD-10-CM

## 2023-08-25 DIAGNOSIS — I10 ESSENTIAL HYPERTENSION: ICD-10-CM

## 2023-08-25 DIAGNOSIS — M51.36 BULGE OF LUMBAR DISC WITHOUT MYELOPATHY: ICD-10-CM

## 2023-08-25 DIAGNOSIS — M51.37 DDD (DEGENERATIVE DISC DISEASE), LUMBOSACRAL: ICD-10-CM

## 2023-08-25 DIAGNOSIS — M47.816 LUMBAR SPONDYLOSIS: Primary | ICD-10-CM

## 2023-08-25 PROCEDURE — 99214 OFFICE O/P EST MOD 30 MIN: CPT | Performed by: PHYSICAL MEDICINE & REHABILITATION

## 2023-08-25 RX ORDER — PREGABALIN 100 MG/1
100 CAPSULE ORAL 2 TIMES DAILY
Qty: 60 CAPSULE | Refills: 1 | Status: SHIPPED | OUTPATIENT
Start: 2023-08-25

## 2023-08-25 RX ORDER — HYDROCODONE BITARTRATE AND ACETAMINOPHEN 10; 325 MG/1; MG/1
1 TABLET ORAL EVERY 8 HOURS PRN
Qty: 60 TABLET | Refills: 0 | Status: SHIPPED | OUTPATIENT
Start: 2023-08-25

## 2023-08-25 RX ORDER — CYCLOBENZAPRINE HCL 5 MG
TABLET ORAL
Qty: 90 TABLET | Refills: 0 | Status: SHIPPED | OUTPATIENT
Start: 2023-08-25

## 2023-08-25 NOTE — PROGRESS NOTES
[address]  Video Visit Progress Note      Telehealth outside of [address] Verbal Consent   I conducted a telehealth visit with Jacques Cotto II today, 08/25/23, which was completed using two-way, real-time interactive audio and video communication. This has been done in good gloria to provide continuity of care in the best interest of the provider-patient relationship, due to the COVID -19 public health crisis/national emergency where restrictions of face-to-face office visits are ongoing. Every conscious effort was taken to allow for sufficient and adequate time to complete the visit. The patient was made aware of the limitations of the telehealth visit, including treatment limitations as no physical exam could be performed. The patient was advised to call 911 or to go to the ER in case there was an emergency. The patient was also advised of the potential privacy & security concerns related to the telehealth platform. The patient was made aware of where to find [facility] notice of privacy practices, telehealth consent form and other related consent forms and documents. which are located on the [facility] website. The patient verbally agreed to telehealth consent form, related consents and the risks discussed. Lastly, the patient confirmed that they were in [place]. Included in this visit, time may have been spent reviewing labs, medications, radiology tests and decision making. Appropriate medical decision-making and tests are ordered as detailed in the plan of care above. Coding/billing information is submitted for this visit based on complexity of care and/or time spent for the visit. CHIEF COMPLAINT:  Patient presents with: Injection  Low Back Pain      History of Present Illness:   The patient is a 50year old  LEFT handed male with significant past medical history of SIBO, gastroenteritis, mariluz's esophagus  who presents for follow up of their low back pain with radiation down both legs to the bilateral gastrocs. He is status post caudal epidural steroid injection on 3/22/2023. He felt that to be tremendously helpful for the radicular symptoms. He is also status post bilateral L4-L5 and L5-S1 facet joint injections on 5/3/2023 as he was having axial low back pain. He is now status post left L5 and left S1 transforaminal epidural steroid injection on 8/9/2023. He improved by 90% that lasted for about 1 to 2 weeks. He rates his pain today a 8/10 with radiation into the left buttock and popliteal fossa. PAST MEDICAL HISTORY:  Past Medical History:   Diagnosis Date    Anxiety state     Back problem     degenerative disc disease    Depression     Esophageal reflux     History of blood transfusion 1992    with emergency appy       SURGICAL HISTORY:  Past Surgical History:   Procedure Laterality Date    APPENDECTOMY  1992    COLECTOMY      COLONOSCOPY  09/03/2021    LAPAROSCOPIC CHOLECYSTECTOMY      OTHER      peritonitis r/t ruptured appendix -  week later 9 inches colon removed       SOCIAL HISTORY:   Social History    Occupational History      Not on file    Tobacco Use      Smoking status: Never      Smokeless tobacco: Never    Vaping Use      Vaping Use: Never used    Substance and Sexual Activity      Alcohol use: Never      Drug use: Never      Sexual activity: Not on file      FAMILY HISTORY:   Family History   Problem Relation Age of Onset    Other (Other) Father         brain trauma    Cancer Mother         breast       CURRENT MEDICATIONS:   Current Outpatient Medications   Medication Sig Dispense Refill    pregabalin 100 MG Oral Cap Take 1 capsule (100 mg total) by mouth 2 (two) times daily. 60 capsule 1    pantoprazole 40 MG Oral Tab EC Take 1 tablet (40 mg total) by mouth every morning before breakfast. 30 tablet 2    HYDROcodone-acetaminophen (NORCO) 7.5-325 MG Oral Tab Take 1 tablet by mouth every 6 (six) hours as needed for Pain.  48 tablet 0    tiZANidine 4 MG Oral Tab Take 1 tablet (4 mg total) by mouth 2 (two) times daily as needed. 60 tablet 1    hydrOXYzine 50 MG Oral Tab Take 1 tablet (50 mg total) by mouth nightly as needed. 30 tablet 0    Sildenafil Citrate 100 MG Oral Tab Take 1 tablet (100 mg total) by mouth daily as needed for Erectile Dysfunction. 30 tablet 1    Rizatriptan Benzoate 10 MG Oral Tab Take 1 tablet (10 mg total) by mouth as needed for Migraine. 10 tablet 3    Sildenafil Citrate 50 MG Oral Tab Take 1 tablet (50 mg total) by mouth daily as needed for Erectile Dysfunction. 30 tablet 1    dicyclomine 10 MG Oral Cap Take 1 capsule (10 mg total) by mouth 4 (four) times daily before meals and nightly. 120 capsule 1    escitalopram 20 MG Oral Tab Take 1 tablet (20 mg total) by mouth daily. 90 tablet 1       ALLERGIES:     Mold                    OTHER (SEE COMMENTS)    Comment:Note: congestion  Nuts                    OTHER (SEE COMMENTS)    Comment:Note: Walnuts: itchy and congestion  Other                   OTHER (SEE COMMENTS)    Comment:Note: congestion  Pollen                  OTHER (SEE COMMENTS)    Comment:Note: congestion  Seasonal                OTHER (SEE COMMENTS)    Comment:Note: congestion  Watermelon              OTHER (SEE COMMENTS)    Comment:Note: itchy and congestion  Theophylline            ITCHING, OTHER (SEE COMMENTS)    REVIEW OF SYSTEMS:   Review of Systems   Constitutional: Negative. HENT: Negative. Eyes: Negative. Respiratory: Negative. Cardiovascular: Negative. Gastrointestinal: Negative. Genitourinary: Negative. Musculoskeletal: As per HPI  Skin: Negative. Neurological: As per HPI  Endo/Heme/Allergies: Negative. Psychiatric/Behavioral: Negative. All other systems reviewed and are negative. Pertinent positives and negatives noted in the HPI. PHYSICAL EXAM:     There is no height or weight on file to calculate BMI.     General: No immediate distress  Head: Normocephalic/ Atraumatic  Eyes: Extra-occular movements intact  Ears/Nose/Throat:  External appearance identifies normal appearance without obvious deformity  Cardiovascular: No cyanosis, clubbing or edema  Respiratory: Non-labored respirations  Skin: No lesions noted   Neurological: alert & oriented x 3, attentive, able to follow commands, comprehention intact, spontaneous speech intact  Psychiatric: Mood and affect appropriate        Data  No visits with results within 6 Month(s) from this visit.    Latest known visit with results is:   Office Visit on 01/27/2023   Component Date Value Ref Range Status    TESTOSTERONE,TOTAL,MALES 01/27/2023 279  250 - 827 ng/dL Final    WHITE BLOOD CELL COUNT 01/27/2023 5.9  3.8 - 10.8 Thousand/uL Final    RED BLOOD CELL COUNT 01/27/2023 5.08  4.20 - 5.80 Million/uL Final    HEMOGLOBIN 01/27/2023 15.3  13.2 - 17.1 g/dL Final    HEMATOCRIT 01/27/2023 44.9  38.5 - 50.0 % Final    MCV 01/27/2023 88.4  80.0 - 100.0 fL Final    MCH 01/27/2023 30.1  27.0 - 33.0 pg Final    MCHC 01/27/2023 34.1  32.0 - 36.0 g/dL Final    RDW 01/27/2023 12.3  11.0 - 15.0 % Final    PLATELET COUNT 41/68/0462 325  140 - 400 Thousand/uL Final    MPV 01/27/2023 9.1  7.5 - 12.5 fL Final    ABSOLUTE NEUTROPHILS 01/27/2023 2,832  1,500 - 7,800 cells/uL Final    ABSOLUTE BAND NEUTROPHILS 01/27/2023 CANCELED  0 - 750 cells/uL Final    ABSOLUTE METAMYELOCYTES 01/27/2023 CANCELED  0 cells/uL Final    ABSOLUTE MYELOCYTES 01/27/2023 CANCELED  0 cells/uL Final    ABSOLUTE PROMYELOCYTES 01/27/2023 CANCELED  0 cells/uL Final    ABSOLUTE LYMPHOCYTES 01/27/2023 2,454  850 - 3,900 cells/uL Final    ABSOLUTE MONOCYTES 01/27/2023 413  200 - 950 cells/uL Final    ABSOLUTE EOSINOPHILS 01/27/2023 130  15 - 500 cells/uL Final    ABSOLUTE BASOPHILS 01/27/2023 71  0 - 200 cells/uL Final    ABSOLUTE BLASTS 01/27/2023 CANCELED  0 cells/uL Final    ABSOLUTE NUCLEATED RBC 01/27/2023 CANCELED  0 cells/uL Final    NEUTROPHILS 01/27/2023 48  % Final    BAND NEUTROPHILS 01/27/2023 CANCELED  % Final    METAMYELOCYTES 01/27/2023 CANCELED  % Final    MYELOCYTES 01/27/2023 CANCELED  % Final    PROMYELOCYTES 01/27/2023 CANCELED  % Final    LYMPHOCYTES 01/27/2023 41.6  % Final    REACTIVE LYMPHOCYTES 01/27/2023 CANCELED  0 - 10 % Final    MONOCYTES 01/27/2023 7.0  % Final    EOSINOPHILS 01/27/2023 2.2  % Final    BASOPHILS 01/27/2023 1.2  % Final    BLASTS 01/27/2023 CANCELED  % Final    NUCLEATED RBC 01/27/2023 CANCELED  0 /100 WBC Final    COMMENT(S) 01/27/2023 CANCELED   Final    GLUCOSE 01/27/2023 95  65 - 99 mg/dL Final    UREA NITROGEN (BUN) 01/27/2023 16  7 - 25 mg/dL Final    CREATININE 01/27/2023 0.99  0.60 - 1.29 mg/dL Final    EGFR 01/27/2023 95  > OR = 60 mL/min/1.73m2 Final    BUN/CREATININE RATIO 35/64/6907 NOT APPLICABLE  6 - 22 (calc) Final    SODIUM 01/27/2023 141  135 - 146 mmol/L Final    POTASSIUM 01/27/2023 4.4  3.5 - 5.3 mmol/L Final    CHLORIDE 01/27/2023 105  98 - 110 mmol/L Final    CARBON DIOXIDE 01/27/2023 28  20 - 32 mmol/L Final    CALCIUM 01/27/2023 9.8  8.6 - 10.3 mg/dL Final    PROTEIN, TOTAL 01/27/2023 7.2  6.1 - 8.1 g/dL Final    ALBUMIN 01/27/2023 4.9  3.6 - 5.1 g/dL Final    GLOBULIN 01/27/2023 2.3  1.9 - 3.7 g/dL (calc) Final    ALBUMIN/GLOBULIN RATIO 01/27/2023 2.1  1.0 - 2.5 (calc) Final    BILIRUBIN, TOTAL 01/27/2023 0.5  0.2 - 1.2 mg/dL Final    ALKALINE PHOSPHATASE 01/27/2023 63  36 - 130 U/L Final    AST 01/27/2023 19  10 - 40 U/L Final    ALT 01/27/2023 15  9 - 46 U/L Final    CHOLESTEROL, TOTAL 01/27/2023 211 (H)  <200 mg/dL Final    HDL CHOLESTEROL 01/27/2023 38 (L)  > OR = 40 mg/dL Final    TRIGLYCERIDES 01/27/2023 186 (H)  <150 mg/dL Final    LDL-CHOLESTEROL 01/27/2023 141 (H)  mg/dL (calc) Final    CHOL/HDLC RATIO 01/27/2023 5.6 (H)  <5.0 (calc) Final    NON-HDL CHOLESTEROL 01/27/2023 173 (H)  <130 mg/dL (calc) Final    PSA, TOTAL 01/27/2023 0.45  < OR = 4.00 ng/mL Final    VITAMIN D, 25-OH, TOTAL 01/27/2023 29 (L)  30 - 100 ng/mL Final   ]      Radiology Imaging:  I reviewed with the patient his MRI of the lumbar spine from 7/21/2022  MRI SPINE LUMBAR (CPT=72148)  Narrative: PROCEDURE:  MRI SPINE LUMBAR (CPT=72148)     LOCATION:                                           COMPARISON:  None. INDICATIONS:  I65.709K Strain of left iliopsoas muscle, initial encounter K21.00 Gastroesophageal reflux disease with esophagitis without hemorrhage M51.26 Bulge of lumbar d*     TECHNIQUE:  Multiplanar T1 and T2 weighted images including fat suppression sequences. Images acquired in sagittal and axial planes. PATIENT STATED HISTORY: (As transcribed by Technologist)  Chronic low back pain for many years. FINDINGS:    LUMBAR DISC LEVELS  L1-L2:  No significant disc/facet abnormality, spinal stenosis, or foraminal narrowing. L2-L3:  Minimal diffuse disc bulge is noted without significant stenosis of central canal or foramina. L3-L4:  There is diffuse disc bulge, endplate osteophyte and facet hypertrophy. There is mild to moderate central canal stenosis. There is moderate bilateral subarticular stenosis and mild to moderate bilateral foraminal stenosis. L4-L5:  There is diffuse disc bulge and moderate facet hypertrophy. There is mild central canal stenosis. There is moderate bilateral subarticular stenosis and mild to moderate bilateral foraminal stenosis. L5-S1:  There is diffuse disc bulge, endplate osteophyte and facet hypertrophy. There is a left parasagittal focal broad-based disc protrusion which abuts the descending left S1 nerve root. There is overall moderate left subarticular stenosis. There   is moderate bilateral foraminal stenosis. There is no significant central canal stenosis. PARASPINAL AREA:  MRI signal in the psoas muscles is normal bilaterally. There is no evidence of muscular tear or strain. BONY STRUCTURES:  There is Modic type 2 degenerative endplate change at L6-R3.   Marrow signal at other levels is normal.  CORD/CAUDA EQUINA:  Normal caliber, contour, and signal intensity. Impression: CONCLUSION:    1. Multilevel degenerative disc disease and degenerative change of facets is described in detail level by level above. 2. There is soft disc protrusion left parasagittal L5-S1 level which abuts the descending left S1 nerve root. Dictated by (CST): Lyndsey Gilbert MD on 7/21/2022 at 7:56 AM       Finalized by (CST): Lyndsey Gilbert MD on 7/21/2022 at 8:01 AM         ASSESSMENT AND PLAN:  Leonie Alas is a pleasant 69-year-old male who presents for follow-up of his low back pain with radicular symptoms into the left buttock and left leg due to a lumbar radiculopathy. He is status post left L5 and left S1 transforaminal epidural steroid injection without much improvement. I am recommending surgical consultation and have referred him to Dr. Virginia Haywood as he does have a protrusion compressing the left S1 nerve root. He should restart Norco and I have given him a small prescription until he is able to see his PCP for this. He should also start cyclobenzaprine and continue the Lyrica. I have ordered a new MRI for him as his last MRI is from July 2022. He will follow-up with me if surgery is not an option. RTC in PRN  Discharge Instructions were provided as documented in AVS summary. The patient was in agreement with the assessment and plan. All questions were answered. There were no barriers to learning. We discussed that a telemedicine visit is in place of an office visit; however, this limits the ability to perform a thorough physical examination which may affect objective findings related to a specific condition and can affect treatment. We also discussed that NSAIDs may mask or worsen COVID-19 infection symptoms.   The patient was also informed that corticosteroids, in any form, may significantly decrease immune response and may increase risk and complications of infection. The patient was advised that given the current situation with COVID-19, it is in his/her best interest to socially distance his/herself. Given this, we are not recommending any elective procedures or office visits at the outpatient surgery center or in the office respectively unless deemed necessary. My staff will be reaching out to the patient for the elective procedure when it is considered appropriate and the patient can follow-up in office as well when appropriate. In the meantime, I will be available for telephone and video encounter. Lumbar spondylosis  (primary encounter diagnosis)  Mechanical low back pain  Bulge of lumbar disc without myelopathy  Facet syndrome, lumbar  Essential hypertension  Biomechanical lesion  Lumbar disc herniation with radiculopathy  Lumbar foraminal stenosis  Myalgia  Lumbar trigger point syndrome  DDD (degenerative disc disease), lumbosacral    Alex Ruggiero MD  Physical Medicine and Rehabilitation/Sports Medicine  MEDICAL CENTER [facility]

## 2023-08-25 NOTE — PATIENT INSTRUCTIONS
1) Make an appointment with Dr. Edie Nichols to discuss surgical options  2) restart Norco 10/325 every 8 hours as needed fr pain. I have written for 60 pills to get you to see Dr. Eugenie Felipe  3) Start cyclobenzaprine 5 mg 0.5-2 tablets three times per day as needed for spasms. Do not operate heavy machinery while on this medication as it may make you sleepy. 4) Continue Lyrica  5) Please call and schedule your MRI at [phone]. Once you have your MRI scheduled, then call my office again to schedule a follow-up visit soon after your MRI so we may review the images together. 6) Follow up with me if surgery is not option.

## 2023-08-29 ENCOUNTER — TELEPHONE (OUTPATIENT)
Dept: NEUROLOGY | Facility: CLINIC | Age: 48
End: 2023-08-29

## 2023-08-29 DIAGNOSIS — M54.16 LUMBAR RADICULOPATHY: ICD-10-CM

## 2023-08-29 DIAGNOSIS — M48.061 LUMBAR FORAMINAL STENOSIS: ICD-10-CM

## 2023-08-29 DIAGNOSIS — M54.59 MECHANICAL LOW BACK PAIN: ICD-10-CM

## 2023-08-29 DIAGNOSIS — M51.37 DDD (DEGENERATIVE DISC DISEASE), LUMBOSACRAL: Primary | ICD-10-CM

## 2023-08-29 DIAGNOSIS — M47.816 FACET SYNDROME, LUMBAR: ICD-10-CM

## 2023-08-29 DIAGNOSIS — M79.10 MYALGIA: ICD-10-CM

## 2023-08-29 DIAGNOSIS — M54.6 CHRONIC MIDLINE THORACIC BACK PAIN: ICD-10-CM

## 2023-08-29 DIAGNOSIS — M51.16 LUMBAR DISC HERNIATION WITH RADICULOPATHY: ICD-10-CM

## 2023-08-29 DIAGNOSIS — G89.29 CHRONIC MIDLINE THORACIC BACK PAIN: ICD-10-CM

## 2023-08-29 DIAGNOSIS — M47.816 LUMBAR SPONDYLOSIS: ICD-10-CM

## 2023-09-07 ENCOUNTER — HOSPITAL ENCOUNTER (OUTPATIENT)
Dept: MRI IMAGING | Facility: HOSPITAL | Age: 48
Discharge: HOME | End: 2023-09-07
Attending: PHYSICAL MEDICINE & REHABILITATION
Payer: COMMERCIAL

## 2023-09-07 DIAGNOSIS — M79.10 MYALGIA: ICD-10-CM

## 2023-09-07 DIAGNOSIS — M51.16 LUMBAR DISC HERNIATION WITH RADICULOPATHY: ICD-10-CM

## 2023-09-07 DIAGNOSIS — M51.36 BULGE OF LUMBAR DISC WITHOUT MYELOPATHY: ICD-10-CM

## 2023-09-07 DIAGNOSIS — M47.816 FACET SYNDROME, LUMBAR: ICD-10-CM

## 2023-09-07 DIAGNOSIS — M54.59 MECHANICAL LOW BACK PAIN: ICD-10-CM

## 2023-09-07 DIAGNOSIS — M51.37 DDD (DEGENERATIVE DISC DISEASE), LUMBOSACRAL: ICD-10-CM

## 2023-09-07 DIAGNOSIS — M48.061 LUMBAR FORAMINAL STENOSIS: ICD-10-CM

## 2023-09-07 DIAGNOSIS — M54.59 LUMBAR TRIGGER POINT SYNDROME: ICD-10-CM

## 2023-09-07 DIAGNOSIS — I10 ESSENTIAL HYPERTENSION: ICD-10-CM

## 2023-09-07 DIAGNOSIS — M47.816 LUMBAR SPONDYLOSIS: ICD-10-CM

## 2023-09-07 DIAGNOSIS — M99.9 BIOMECHANICAL LESION: ICD-10-CM

## 2023-09-07 PROCEDURE — 72148 MRI LUMBAR SPINE W/O DYE: CPT | Performed by: PHYSICAL MEDICINE & REHABILITATION

## 2023-09-07 RX ORDER — PREGABALIN 100 MG/1
100 CAPSULE ORAL 2 TIMES DAILY
Qty: 60 CAPSULE | Refills: 1 | Status: SHIPPED | OUTPATIENT
Start: 2023-09-07 | End: 2023-09-08

## 2023-09-07 RX ORDER — CYCLOBENZAPRINE HCL 5 MG
TABLET ORAL
Qty: 90 TABLET | Refills: 0 | Status: SHIPPED | OUTPATIENT
Start: 2023-09-07 | End: 2023-09-08

## 2023-09-07 NOTE — TELEPHONE ENCOUNTER
Refill Request    Medication request: cyclobenzaprine 5 MG Oral Tab. 5 mg 1-2 tablets three times per day as needed for spasms. Do not operate heavy machinery while on this medication as it may make you sleepy      LOV:3/6/2023 (08/25/2023 Telemedicine) Zan Talamantes MD   Due back to clinic per last office note:  Return to clinic PRN  NOV: Visit date not found      [place]: 08/25/2023 #90 (15 days)    Urine drug screen (if applicable): N/A  Pain contract: N/A    LOV plan (if weaning or changing medications): Per Dr Little [facility] should also start cyclobenzaprine and continue the Lyrica. \"        Refill Request    Medication request: pregabalin 100 MG Oral Cap. Take 1 capsule (100 mg total) by mouth 2 (two) times daily. [place]: 08/18/2023 #60 (30 days)    LOV plan (if weaning or changing medications): Per Dr Little [facility] should also start cyclobenzaprine and continue the Lyrica.  \"

## 2023-09-08 ENCOUNTER — PATIENT MESSAGE (OUTPATIENT)
Dept: PHYSICAL MEDICINE AND REHAB | Facility: CLINIC | Age: 48
End: 2023-09-08

## 2023-09-11 ENCOUNTER — TELEPHONE (OUTPATIENT)
Dept: PHYSICAL MEDICINE AND REHAB | Facility: CLINIC | Age: 48
End: 2023-09-11

## 2023-09-11 NOTE — TELEPHONE ENCOUNTER
Per Dr.Behar: \"He should continue with what his PCP said as discussion occurred after our visit. Any further gabapentin or Lyrica refill should go through the PCP as they are now managing it\"    Spoke with [address] [place] [address] and relayed the above message. Nothing further needed at this time.

## 2023-09-11 NOTE — TELEPHONE ENCOUNTER
Spoke with [address] [place] [address] who received Lyrica 100 mg  BID rx from Dr.Behar pn 9/7/23. Pharmacy stated patient recently picked up rx from PCP office for Lyrica 200 mg BID on 9/8/23. Pharmacy wanted to clarify dosing with Dr.Behar since they received 2 different prescriptions. (Patient was seen by PCP office on 9/8/23, but nothing is mentioned in their note regarding the pregabalin.)      Message sent to Dr.Behar to clarify Pregabalin rx.

## 2023-09-27 ENCOUNTER — HOSPITAL ENCOUNTER (OUTPATIENT)
Dept: MRI IMAGING | Facility: HOSPITAL | Age: 48
Discharge: HOME | End: 2023-09-27
Attending: PHYSICAL MEDICINE & REHABILITATION
Payer: COMMERCIAL

## 2023-09-27 DIAGNOSIS — M47.816 LUMBAR SPONDYLOSIS: ICD-10-CM

## 2023-09-27 DIAGNOSIS — M54.16 LUMBAR RADICULOPATHY: ICD-10-CM

## 2023-09-27 DIAGNOSIS — M54.6 CHRONIC MIDLINE THORACIC BACK PAIN: ICD-10-CM

## 2023-09-27 DIAGNOSIS — M47.816 FACET SYNDROME, LUMBAR: ICD-10-CM

## 2023-09-27 DIAGNOSIS — M51.16 LUMBAR DISC HERNIATION WITH RADICULOPATHY: ICD-10-CM

## 2023-09-27 DIAGNOSIS — M51.37 DDD (DEGENERATIVE DISC DISEASE), LUMBOSACRAL: ICD-10-CM

## 2023-09-27 DIAGNOSIS — M79.10 MYALGIA: ICD-10-CM

## 2023-09-27 DIAGNOSIS — G89.29 CHRONIC MIDLINE THORACIC BACK PAIN: ICD-10-CM

## 2023-09-27 DIAGNOSIS — M48.061 LUMBAR FORAMINAL STENOSIS: ICD-10-CM

## 2023-09-27 DIAGNOSIS — M54.59 MECHANICAL LOW BACK PAIN: ICD-10-CM

## 2023-09-27 PROCEDURE — 72146 MRI CHEST SPINE W/O DYE: CPT | Performed by: PHYSICAL MEDICINE & REHABILITATION

## 2024-02-08 ENCOUNTER — APPOINTMENT (OUTPATIENT)
Dept: URBAN - METROPOLITAN AREA CLINIC 244 | Age: 49
Setting detail: DERMATOLOGY
End: 2024-02-08

## 2024-02-08 DIAGNOSIS — D17 BENIGN LIPOMATOUS NEOPLASM: ICD-10-CM

## 2024-02-08 DIAGNOSIS — L28.0 LICHEN SIMPLEX CHRONICUS: ICD-10-CM

## 2024-02-08 PROBLEM — D17.0 BENIGN LIPOMATOUS NEOPLASM OF SKIN AND SUBCUTANEOUS TISSUE OF HEAD, FACE AND NECK: Status: ACTIVE | Noted: 2024-02-08

## 2024-02-08 PROCEDURE — OTHER PRESCRIPTION: OTHER

## 2024-02-08 PROCEDURE — OTHER COUNSELING: OTHER

## 2024-02-08 PROCEDURE — 99203 OFFICE O/P NEW LOW 30 MIN: CPT

## 2024-02-08 PROCEDURE — OTHER DEFER: OTHER

## 2024-02-08 PROCEDURE — OTHER ADDITIONAL NOTES: OTHER

## 2024-02-08 RX ORDER — FLUOCINONIDE 0.5 MG/G
OINTMENT TOPICAL
Qty: 30 | Refills: 0 | Status: ERX | COMMUNITY
Start: 2024-02-08

## 2024-02-08 ASSESSMENT — LOCATION SIMPLE DESCRIPTION DERM
LOCATION SIMPLE: LEFT FOREHEAD
LOCATION SIMPLE: LEFT HAND

## 2024-02-08 ASSESSMENT — LOCATION DETAILED DESCRIPTION DERM
LOCATION DETAILED: LEFT DORSAL INDEX METACARPOPHALANGEAL JOINT
LOCATION DETAILED: LEFT MEDIAL FOREHEAD

## 2024-02-08 ASSESSMENT — LOCATION ZONE DERM
LOCATION ZONE: HAND
LOCATION ZONE: FACE

## 2024-02-08 NOTE — PROCEDURE: ADDITIONAL NOTES
Detail Level: Simple
Render Risk Assessment In Note?: no
Additional Notes: Will consider ILK if not improved

## 2024-02-08 NOTE — PROCEDURE: DEFER
Procedure To Be Performed At Next Visit: Excision
Size Of Lesion In Cm (Optional): 0
Introduction Text (Please End With A Colon): The following procedure was deferred:
Detail Level: Detailed
Instructions (Optional): Will use full excision tray

## 2024-02-16 ENCOUNTER — APPOINTMENT (OUTPATIENT)
Dept: URBAN - METROPOLITAN AREA CLINIC 244 | Age: 49
Setting detail: DERMATOLOGY
End: 2024-02-20

## 2024-02-16 DIAGNOSIS — D17 BENIGN LIPOMATOUS NEOPLASM: ICD-10-CM

## 2024-02-16 PROBLEM — D17.0 BENIGN LIPOMATOUS NEOPLASM OF SKIN AND SUBCUTANEOUS TISSUE OF HEAD, FACE AND NECK: Status: ACTIVE | Noted: 2024-02-16

## 2024-02-16 PROCEDURE — OTHER SOFT TISSUE EXCISION: OTHER

## 2024-02-16 PROCEDURE — 21011 EXC FACE LES SC <2 CM: CPT

## 2024-02-16 ASSESSMENT — LOCATION ZONE DERM: LOCATION ZONE: FACE

## 2024-02-16 ASSESSMENT — LOCATION SIMPLE DESCRIPTION DERM: LOCATION SIMPLE: LEFT FOREHEAD

## 2024-02-16 ASSESSMENT — LOCATION DETAILED DESCRIPTION DERM: LOCATION DETAILED: LEFT MEDIAL FOREHEAD

## 2024-02-16 NOTE — PROCEDURE: SOFT TISSUE EXCISION
Disclaimer: Primary Closures are bundled in Soft Tissue Excision cpt codes. If you feel complex repairs, flaps or graft closures are warranted you will have to document them separately and must justify your reasoning for adding these closures. You assume the risk of audit by doing so.
Detail Level: Detailed
Insurance Zone (Required For Proper Billing): Face/Scalp
X Size Of Lesion In Cm (Optional): 0
Excision Depth (Required For Proper Billing): subcutaneous tissue
Excision Method: Slit
Repair Type: None
Complex Requirements: Extensive Undermining Performed?: No
Undermining Type: Entire Wound
Debridement Text: The wound edges were debrided prior to proceeding with the closure to facilitate wound healing.
Helical Rim Text: The closure involved the helical rim.
Vermilion Border Text: The closure involved the vermilion border.
Nostril Rim Text: The closure involved the nostril rim.
Retention Suture Text: Retention sutures were placed to support the closure and prevent dehiscence.
Suture Removal: 14 days
Anesthesia Volume In Cc: 6
Scalpel Size: 15 blade
Anesthesia Type: 1% lidocaine with epinephrine
Hemostasis: Electrocautery
Estimated Blood Loss (Cc): minimal
Lab: -[id number]
Deep Sutures: 3-0 Vicryl
Epidermal Sutures: 4-0 Nylon
Epidermal Closure: simple interrupted
Wound Care: Petrolatum
Dressing: dry sterile dressing
Path Notes (To The Dermatopathologist): Lipoma vs Angiolipoma vs other
Medical Necessity Clause: This procedure was medically necessary because the lesion that was treated was:
Consent was obtained from the patient. The risks and benefits to therapy were discussed in detail. Specifically, the risks of infection, scarring, bleeding, prolonged wound healing, incomplete removal, allergy to anesthesia, nerve injury and recurrence were addressed. Prior to the procedure, the treatment site was clearly identified and confirmed by the patient.
Post-Care Instructions: I reviewed with the patient in detail post-care instructions. Patient is not to engage in any heavy lifting, exercise, or swimming for the next 14 days. Should the patient develop any fevers, chills, bleeding, severe pain patient will contact the office immediately.
Add Superficial Fascia When Documenting Dermal Sutures?: Yes
Billing Type: Third-Party Bill

## 2024-02-23 ENCOUNTER — APPOINTMENT (OUTPATIENT)
Dept: URBAN - METROPOLITAN AREA CLINIC 244 | Age: 49
Setting detail: DERMATOLOGY
End: 2024-02-24

## 2024-02-23 DIAGNOSIS — L82.1 OTHER SEBORRHEIC KERATOSIS: ICD-10-CM

## 2024-02-23 DIAGNOSIS — D22 MELANOCYTIC NEVI: ICD-10-CM

## 2024-02-23 DIAGNOSIS — L81.4 OTHER MELANIN HYPERPIGMENTATION: ICD-10-CM

## 2024-02-23 PROBLEM — D22.5 MELANOCYTIC NEVI OF TRUNK: Status: ACTIVE | Noted: 2024-02-23

## 2024-02-23 PROBLEM — D22.62 MELANOCYTIC NEVI OF LEFT UPPER LIMB, INCLUDING SHOULDER: Status: ACTIVE | Noted: 2024-02-23

## 2024-02-23 PROBLEM — D22.61 MELANOCYTIC NEVI OF RIGHT UPPER LIMB, INCLUDING SHOULDER: Status: ACTIVE | Noted: 2024-02-23

## 2024-02-23 PROBLEM — D22.39 MELANOCYTIC NEVI OF OTHER PARTS OF FACE: Status: ACTIVE | Noted: 2024-02-23

## 2024-02-23 PROCEDURE — OTHER LIQUID NITROGEN (COSMETIC): OTHER

## 2024-02-23 PROCEDURE — 99213 OFFICE O/P EST LOW 20 MIN: CPT | Mod: 24

## 2024-02-23 PROCEDURE — OTHER COUNSELING: OTHER

## 2024-02-23 ASSESSMENT — LOCATION DETAILED DESCRIPTION DERM
LOCATION DETAILED: SUPERIOR THORACIC SPINE
LOCATION DETAILED: GLABELLA
LOCATION DETAILED: LEFT PROXIMAL DORSAL FOREARM
LOCATION DETAILED: RIGHT INFERIOR MEDIAL MALAR CHEEK
LOCATION DETAILED: LEFT DISTAL POSTERIOR UPPER ARM
LOCATION DETAILED: RIGHT DISTAL POSTERIOR UPPER ARM
LOCATION DETAILED: RIGHT ELBOW
LOCATION DETAILED: LEFT ELBOW
LOCATION DETAILED: LEFT INFERIOR CENTRAL MALAR CHEEK
LOCATION DETAILED: RIGHT PROXIMAL DORSAL FOREARM
LOCATION DETAILED: RIGHT INFERIOR CENTRAL MALAR CHEEK
LOCATION DETAILED: INFERIOR THORACIC SPINE

## 2024-02-23 ASSESSMENT — LOCATION SIMPLE DESCRIPTION DERM
LOCATION SIMPLE: LEFT FOREARM
LOCATION SIMPLE: RIGHT POSTERIOR UPPER ARM
LOCATION SIMPLE: LEFT CHEEK
LOCATION SIMPLE: LEFT ELBOW
LOCATION SIMPLE: GLABELLA
LOCATION SIMPLE: LEFT POSTERIOR UPPER ARM
LOCATION SIMPLE: UPPER BACK
LOCATION SIMPLE: RIGHT ELBOW
LOCATION SIMPLE: RIGHT FOREARM
LOCATION SIMPLE: RIGHT CHEEK

## 2024-02-23 ASSESSMENT — LOCATION ZONE DERM
LOCATION ZONE: TRUNK
LOCATION ZONE: ARM
LOCATION ZONE: FACE

## 2024-02-23 NOTE — PROCEDURE: LIQUID NITROGEN (COSMETIC)
Price (Use Numbers Only, No Special Characters Or $): 75
Post-Care Instructions: I reviewed with the patient in detail post-care instructions. Patient is to wear sunprotection, and avoid picking at any of the treated lesions. Pt may apply Vaseline to crusted or scabbing areas.
Spray Paint Technique: No
Billing Information: Bill by Static Price
Consent: The patient's consent was obtained including but not limited to risks of crusting, scabbing, blistering, scarring, darker or lighter pigmentary change, recurrence, incomplete removal and infection. The patient understands that the procedure is cosmetic in nature and is not covered by insurance.
Show Spray Paint Technique Variable?: Yes
Spray Paint Text: The liquid nitrogen was applied to the skin utilizing a spray paint frosting technique.
Detail Level: Detailed

## 2024-04-23 ENCOUNTER — OFFICE VISIT (OUTPATIENT)
Dept: SURGERY | Facility: CLINIC | Age: 49
End: 2024-04-23
Payer: COMMERCIAL

## 2024-04-23 ENCOUNTER — LAB ENCOUNTER (OUTPATIENT)
Dept: LAB | Facility: HOSPITAL | Age: 49
End: 2024-04-23
Attending: NEUROLOGICAL SURGERY
Payer: COMMERCIAL

## 2024-04-23 VITALS
HEIGHT: 73 IN | SYSTOLIC BLOOD PRESSURE: 133 MMHG | WEIGHT: 215 LBS | BODY MASS INDEX: 28.49 KG/M2 | HEART RATE: 108 BPM | DIASTOLIC BLOOD PRESSURE: 87 MMHG

## 2024-04-23 DIAGNOSIS — M47.27 LUMBOSACRAL SPONDYLOSIS WITH RADICULOPATHY: Primary | ICD-10-CM

## 2024-04-23 DIAGNOSIS — M47.27 LUMBOSACRAL SPONDYLOSIS WITH RADICULOPATHY: ICD-10-CM

## 2024-04-23 DIAGNOSIS — M47.816 LUMBAR SPONDYLOSIS: ICD-10-CM

## 2024-04-23 DIAGNOSIS — M48.061 LUMBAR FORAMINAL STENOSIS: ICD-10-CM

## 2024-04-23 LAB
ALBUMIN SERPL-MCNC: 5 G/DL (ref 3.2–4.8)
ALBUMIN/GLOB SERPL: 1.8 {RATIO} (ref 1–2)
ALP LIVER SERPL-CCNC: 59 U/L
ALT SERPL-CCNC: 20 U/L
ANION GAP SERPL CALC-SCNC: 5 MMOL/L (ref 0–18)
APTT PPP: 29.5 SECONDS (ref 23.3–35.6)
AST SERPL-CCNC: 21 U/L (ref ?–34)
BASOPHILS # BLD AUTO: 0.05 X10(3) UL (ref 0–0.2)
BASOPHILS NFR BLD AUTO: 0.7 %
BILIRUB SERPL-MCNC: 0.6 MG/DL (ref 0.3–1.2)
BUN BLD-MCNC: 9 MG/DL (ref 9–23)
BUN/CREAT SERPL: 7.9 (ref 10–20)
CALCIUM BLD-MCNC: 10.1 MG/DL (ref 8.7–10.4)
CHLORIDE SERPL-SCNC: 108 MMOL/L (ref 98–112)
CO2 SERPL-SCNC: 28 MMOL/L (ref 21–32)
CREAT BLD-MCNC: 1.14 MG/DL
DEPRECATED RDW RBC AUTO: 41.1 FL (ref 35.1–46.3)
EGFRCR SERPLBLD CKD-EPI 2021: 79 ML/MIN/1.73M2 (ref 60–?)
EOSINOPHIL # BLD AUTO: 0.05 X10(3) UL (ref 0–0.7)
EOSINOPHIL NFR BLD AUTO: 0.7 %
ERYTHROCYTE [DISTWIDTH] IN BLOOD BY AUTOMATED COUNT: 13.1 % (ref 11–15)
FASTING STATUS PATIENT QL REPORTED: YES
GLOBULIN PLAS-MCNC: 2.8 G/DL (ref 2.8–4.4)
GLUCOSE BLD-MCNC: 98 MG/DL (ref 70–99)
HCT VFR BLD AUTO: 51.5 %
HGB BLD-MCNC: 17.7 G/DL
IMM GRANULOCYTES # BLD AUTO: 0 X10(3) UL (ref 0–1)
IMM GRANULOCYTES NFR BLD: 0 %
INR BLD: 1 (ref 0.8–1.2)
LYMPHOCYTES # BLD AUTO: 1.42 X10(3) UL (ref 1–4)
LYMPHOCYTES NFR BLD AUTO: 20.9 %
MCH RBC QN AUTO: 29.5 PG (ref 26–34)
MCHC RBC AUTO-ENTMCNC: 34.4 G/DL (ref 31–37)
MCV RBC AUTO: 86 FL
MONOCYTES # BLD AUTO: 0.41 X10(3) UL (ref 0.1–1)
MONOCYTES NFR BLD AUTO: 6 %
NEUTROPHILS # BLD AUTO: 4.88 X10 (3) UL (ref 1.5–7.7)
NEUTROPHILS # BLD AUTO: 4.88 X10(3) UL (ref 1.5–7.7)
NEUTROPHILS NFR BLD AUTO: 71.7 %
OSMOLALITY SERPL CALC.SUM OF ELEC: 291 MOSM/KG (ref 275–295)
PLATELET # BLD AUTO: 266 10(3)UL (ref 150–450)
POTASSIUM SERPL-SCNC: 4.8 MMOL/L (ref 3.5–5.1)
PROT SERPL-MCNC: 7.8 G/DL (ref 5.7–8.2)
PROTHROMBIN TIME: 13.8 SECONDS (ref 11.6–14.8)
RBC # BLD AUTO: 5.99 X10(6)UL
SODIUM SERPL-SCNC: 141 MMOL/L (ref 136–145)
WBC # BLD AUTO: 6.8 X10(3) UL (ref 4–11)

## 2024-04-23 PROCEDURE — 36415 COLL VENOUS BLD VENIPUNCTURE: CPT | Performed by: NEUROLOGICAL SURGERY

## 2024-04-23 PROCEDURE — 85025 COMPLETE CBC W/AUTO DIFF WBC: CPT | Performed by: NEUROLOGICAL SURGERY

## 2024-04-23 PROCEDURE — 85610 PROTHROMBIN TIME: CPT | Performed by: NEUROLOGICAL SURGERY

## 2024-04-23 PROCEDURE — 87081 CULTURE SCREEN ONLY: CPT

## 2024-04-23 PROCEDURE — 85730 THROMBOPLASTIN TIME PARTIAL: CPT | Performed by: NEUROLOGICAL SURGERY

## 2024-04-23 PROCEDURE — 80053 COMPREHEN METABOLIC PANEL: CPT | Performed by: NEUROLOGICAL SURGERY

## 2024-04-23 PROCEDURE — 99205 OFFICE O/P NEW HI 60 MIN: CPT | Performed by: NEUROLOGICAL SURGERY

## 2024-04-23 NOTE — PROGRESS NOTES
Patient has seen Dr. Cardona at his [place] office on 09/14/2023 for his lower back symptoms. Patient is new at this facility but he is following up because he states his lower back pain radiating into bilateral buttock and legs is worsening. Also he states that when turning his neck, it sounds like he has sand in his ears.

## 2024-04-23 NOTE — PROGRESS NOTES
KAREN REYEZ M.D., F.A.A.N.S.     of Neurosurgery  [facility]  Board Certified Neurosurgeon                          Formerly [facility], [facility], [facility]        [address]  [address]  [place] [address]    PHONE  [phone]          FAX  [phone]    [email or web address]      OFFICE FOLLOW-UP NOTE      Louis Raymundo II    : 1975    MRN: [id number]  CSN: [id number]      PCP: LUPE EVANS MD  Referring Provider: No ref. provider found    Insurance: Payor: [employer] / Plan: [employer] PPO / Product Type: PPO /     Date of Visit: 2024    Reason for Visit:   Chief Complaint    Consult                         History of Present Care:  Louis Raymundo II is a a(n) 48 year old, male.  He has previously been evaluated by me for mechanical lower back pain and two-level disc disease.  He was a surgical candidate at that point in time, in the fall .  Today, he is following up with significantly worsened lower back discomfort.  The pain radiates into his left lower extremity all the way down to the heel.  He also has occasional right buttock discomfort but not pain radiating below.  The pain gets to be a 10 out of 10 and he has difficulty getting out of bed from time to time.  He has attempted physical therapy, multiple epidural steroid injections as well as facet rhizotomies without any significant improvement.  He is here to rediscuss the option of surgery.      History:  .  Past Medical History:    Anxiety state    Back problem    degenerative disc disease    Depression    Esophageal reflux    History of blood transfusion    with emergency appy      Past Surgical History:   Procedure Laterality Date    Appendectomy      Colectomy      Colonoscopy  2021    Laparoscopic cholecystectomy      Other      peritonitis r/t  ruptured appendix -  week later 9 inches colon removed      Family History   Problem Relation Age of Onset    Other (Other) Father         brain trauma    Cancer Mother         breast      Social History     Socioeconomic History    Marital status:      Spouse name: Not on file    Number of children: Not on file    Years of education: Not on file    Highest education level: Not on file   Occupational History    Not on file   Tobacco Use    Smoking status: Never    Smokeless tobacco: Never   Vaping Use    Vaping status: Never Used   Substance and Sexual Activity    Alcohol use: Never    Drug use: Never    Sexual activity: Not on file   Other Topics Concern    Caffeine Concern Yes    Exercise Not Asked    Seat Belt Not Asked    Special Diet No    Stress Concern Not Asked    Weight Concern Not Asked   Social History Narrative    Not on file     Social Determinants of Health     Financial Resource Strain: Not on file   Food Insecurity: Low Risk  (4/28/2023)    Received from [facility]    Food Insecurity     Have there been times that your food ran out, and you didn't have money to get more?: No     Are there times that you worry that this might happen?: No   Transportation Needs: Low Risk  (4/28/2023)    Received from [facility]    Transportation Needs     Do you have trouble getting transportation to medical appointments?: No     How do you normally get to and from your appointments?: Not on file   Physical Activity: Not on file   Stress: Not on file   Social Connections: Not on file   Housing Stability: Not on file (4/28/2023)        Allergies:  Allergies   Allergen Reactions    Mold OTHER (SEE COMMENTS)     Note: congestion    Other OTHER (SEE COMMENTS)     Note: congestion    Pollen OTHER (SEE COMMENTS)     Note: congestion    Seasonal OTHER (SEE COMMENTS)     Note: congestion    Watermelon OTHER  (SEE COMMENTS)     Note: itchy and congestion    Theophylline ITCHING and OTHER (SEE COMMENTS)         Medications:  Current Outpatient Medications   Medication Sig Dispense Refill    pregabalin 200 MG Oral Cap Take 1 capsule (200 mg total) by mouth 2 (two) times daily. 60 capsule 0    HYDROcodone-acetaminophen  MG Oral Tab Take 1 tablet by mouth every 8 (eight) hours as needed for Pain. 90 tablet 0    pantoprazole 40 MG Oral Tab EC Take 1 tablet (40 mg total) by mouth every morning before breakfast. 30 tablet 2    hydrOXYzine 50 MG Oral Tab Take 1 tablet (50 mg total) by mouth nightly as needed. 30 tablet 0    Tadalafil (CIALIS) 20 MG Oral Tab Take 1 tablet (20 mg total) by mouth as needed for Erectile Dysfunction. 24 tablet 1    cyclobenzaprine 10 MG Oral Tab Take 1 tablet (10 mg total) by mouth 2 (two) times daily as needed for Muscle spasms. 60 tablet 0    dicyclomine 10 MG Oral Cap Take 1 capsule (10 mg total) by mouth 4 (four) times daily before meals and nightly. 120 capsule 1    Rizatriptan Benzoate 10 MG Oral Tab Take 1 tablet (10 mg total) by mouth as needed for Migraine. 10 tablet 3    escitalopram 20 MG Oral Tab Take 1 tablet (20 mg total) by mouth daily. 90 tablet 1        Review of Systems:  A 10-point system was reviewed.  Pertinent positives and negatives are noted in HPI.      Physical Exam:  /87 (BP Location: Right arm, Patient Position: Sitting, Cuff Size: large)   Pulse 108   Ht 73\"   Wt 215 lb (97.5 kg)   BMI 28.37 kg/m²         Neurological Exam:    AAOx3, following commands  PERRLA  EOMI  Face symmetrical  Tongue midline  Hearing symmetrical and intact to finger rub    No rhinorrhea or otorrhea    Romberg negative    Motor Strength:  Left plantarflexion is minus 5 out of 5 relative to the right    Sensation to light touch:  Symmetrical in the lower extremities    Incision:  None    Abdomen:  Soft, non-distended, non-tender, with no rebound or guarding.  No peritoneal signs.      Extremities:  Non-tender, no lower extremity edema noted.      Labs:  CBC:  Lab Results   Component Value Date    WBC 7.1 04/08/2024    HGB 17.3 (H) 04/08/2024    HCT 51.8 (H) 04/08/2024    MCV 86.5 04/08/2024     04/08/2024      BMG:  Lab Results   Component Value Date     04/08/2024    K 4.5 04/08/2024    CO2 24 04/08/2024     04/08/2024    BUN 11 04/08/2024      INR:  No results found for: \"INR\", \"PROTIME\"       Diagnostics:  I again reviewed the MRI of the lumbar spine without contrast.  There is loss of regional lumbar lordosis with significant evidence of endplate changes send degenerative disc disease at L4-5.  There is severe left lateral recess stenosis at L4-5 and foraminal stenosis at L5-S1.    Diagnosis:  1. Lumbar foraminal stenosis    2. Lumbar spondylosis    3. Lumbosacral spondylosis with radiculopathy      Assessment/Plan:  Our patient has returned with significantly worsened symptoms of lower back pain and lumbosacral radiculopathy.  He has attempted all conservative treatment modalities without any significant improvement and he remains a surgical candidate.  At this point in time, he has decided to proceed with surgery.  We again discussed a minimally invasive left L4-5, L5-S1 laminectomy, and transforaminal lumbar interbody fusion with decompression of the foramina via complete facetectomies on the left.  We discussed indications and details of the procedure.  We will be scheduling the surgery for 13 May pending preoperative clearance at [facility].    More than 30 minutes were spent during this visit and the coordination of this patient's care. All questions and concerns were addressed. We appreciate the opportunity to participate in the care of this patient. Please do not hesitate to call our office ([phone]) with any issues.        Shawn Cardona M.D., F.A.A.N.S.    4/23/2024  10:14 AM    This note has been dictated utilizing voice recognition software.  Unfortunately, this may lead to occasional typographical errors. If there are any questions regarding this, please do not hesitate to contact our office.

## 2024-04-23 NOTE — PATIENT INSTRUCTIONS
You are scheduled for left lumbar 4-5, lumbar 5-sacral 1 transforaminal interbody fusion on 5-13-24 with Dr. Cardona at [facility].    PCP clearance is needed within 30 days of surgery.  We have faxed a request for pre-op clearance to your primary care physician Dr Cerda. Please contact their office for appointment.  Please schedule this appointment AT LEAST 1 WEEK PRIOR TO SURGERY DATE.  Your PCP may order additional testing or clearance from another specialist.   You will have an appointment with our RN Spine Navigator prior to surgery.  This is an educational telehealth/phone visit in which you will receive more information on the specifics of your surgery, instructions for before surgery, what to expect in the hospital and how to take care of yourself after surgery.  Your surgeon wants you to to participate in this visit so that you are as prepared for surgery as possible and have an opportunity to ask questions.  If possible, we would like your care partner to be present for the visit as well.    You will need to contact the Pre-admission department at [phone]. The Pre-Admission nurse will review your health history and give you information for day-of instructions. The nurse will also get you scheduled for all your pre-op testing required for your surgery.   You will need pre-operative labs which will include blood work and a MRSA/MSSA test (nasal swab). You will need for fast for the blood work. You may also need an EKG and/or chest x-ray depending on your current health status.    Do not take any blood thinning medications such as over the counter NSAIDS (advil, aleve, ibuprofen etc.), herbal supplements (garlic,tumeric etc.), vitamin E, fish oil or krill oil for at least 7 days prior to surgery.  If you have questions about your other medications, please call our office or send a [employer] message.   You should have nothing to eat or drink after 11:00pm the night prior to surgery except for the  following:  Do drink 12 ounces of regular Gatorade (NOT RED) 12 hours and 4 hours prior to your scheduled surgery time. Do not drink any other liquids (including water) before your surgery. Take 1000 mg of Tylenol (Acetaminophen) 4 hours before your scheduled surgery time, take this with your scheduled Gatorade.  In order to prevent infection, you will need to purchase Hibiclens soap and use it after your regular body soap for 5 days prior to your procedure.  The last shower should be the night before surgery.  This soap can be found at any pharmacy in the first aid section. See detailed instructions below.    Our office will get prior authorization for surgery through your insurance.    Surgery is usually scheduled as 2-3 day admission.  This is an estimate and varies from person to person.  Ultimately, the surgeon will determine when you are ready to be discharged.  The hospital will contact you 1-2 days before surgery with your arrival time.       If you require FMLA or disability paperwork for your recovery, please make sure to either drop it off or have it faxed to our office at [phone]. Be sure the form has your name and date of birth on it.  The form will be faxed to our Forms Department and they will complete it for you.  There is a 25$ fee for all forms that need to be filled out.  Please be aware there is a 10-14 day turnaround time.  You will need to sign a release of information (PATTI) form if your paperwork does not come with one.  You may call the Forms Department with any questions at [phone].  Their fax number is [phone].     POST OP CARE--First Two Weeks  No bending at waist, twisting, pushing or pulling  No lifting more than 10 lb (a gallon of milk)  Wear cervical collar/lumbar brace, if prescribed, as instructed by surgeon  No overhead reaching  No driving until approved by your surgeon   Change positions frequently. No prolonged sitting or standing.  Increase walking as tolerated to  avoid blood clots  No NSAIDs until approved by surgeon (ibuprofen, aleve, advil, meloxicam, Celebrex, diclofenac etc).   Remove any bandage on post op day 3.  Leave incision open to air.  Glue will fall off on its own.  If you have staples or sutures that are not dissolvable, these will be removed at your 2 or 3 week post op visit.   Check your incision for signs of infection daily (redness, warmth, drainage, increased pain at incision site, fever)  Do not shower until post op day 3.  Do not allow water pressure to directly hit the incision.  Do not scrub the incision and avoid any lotion, creams or perfume near the incision site.  No swimming, hot tubs or baths until your incision is completely healed.  Take pain medication as prescribed, if needed.  Constipation is a common side effect of opioids.  If you experience constipation, drink plenty of water and take over-the-counter stool softeners daily.   Avoid nicotine and alcohol   When to call the office:  Increased or change in location of pain  Increased weakness in arms or legs  Incision drainage, redness or warmth  Fever  Bowel or bladder changes   Choking on food or liquids (cervical)  Pain, redness, swelling, color changes or warmth in lower leg    Hibiclens Bathing  Hibiclens is a body soap that is used before surgery to protect you from getting an infection post-operatively  Hibiclens comes in a large blue bottle and can be found in most pharmacies in the First Aid supplies  Shower with this daily for FIVE consecutive days before surgery, using the entire bottle over the five days.  The last shower should be the night before surgery.   Steps to bathing with Hibiclens  Do not use Hibiclens on your hair, face or private areas  Wash your hair and body as normal with your usual cleansers  Rinse well  Using a clean wet washcloth apply enough Hibiclens to cover your body. Wash from the neck down avoiding the genital areas and concentrating on the surgical  area  Rinse well  Dry yourself with a clean, dry towel  Do not use any powders, creams, lotions or sprays on your body as these attract bacteria  Deodorant, hand lotion and facial creams are acceptable.

## 2024-04-24 ENCOUNTER — TELEPHONE (OUTPATIENT)
Dept: SURGERY | Facility: CLINIC | Age: 49
End: 2024-04-24

## 2024-04-24 DIAGNOSIS — M47.816 LUMBAR SPONDYLOSIS: ICD-10-CM

## 2024-04-24 DIAGNOSIS — M48.061 LUMBAR FORAMINAL STENOSIS: Primary | ICD-10-CM

## 2024-04-25 NOTE — TELEPHONE ENCOUNTER
Prior Authorization for Inpatient surgery initiated with Elisa LIN at [facility] [phone].  Requesting coverage for:left lumbar 4-5, lumbar 5-sacral 1 transforaminal interbody fusion   Date of Service: 05/13/24   Inpatient days requested: 2 Days  CPT codes: 20791, 67986, 50353, 05705, 93360, 47893, 70916, 80434, 05325    Request for surgery pending review, pending reference #[id number]. Clinical reviewer will reach out for clinicals.      Faxed clinicals to [phone].  Confirmation received.

## 2024-04-25 NOTE — TELEPHONE ENCOUNTER
Patient is scheduled for Left MIS L4-5, L5-S1 laminectomy, and transforaminal lumbar interbody fusion  on 5-13-24 with Dr Cardona at [facility].    N/a pre-op apt scheduled (if sx is more than 30 days from last apt)  Ypre-op apt scheduled with RN spine navigator  Y Surgical instructions reviewed by nursing staff with patient  Y  form completed  Y Surgery order signed   Y Placed sx on surgery sheet  Y Placed on outlook calendar  Y HERMEL DELORt message sent to patient with sx instructions  Y Faxed pre-op clearance request to PCP Dr Cerda   N/a  Faxed letter to prescribing provider requesting anticoagulants be held for surgery  N/a E-mail sent to [employer] [facility]   Y Post-op appointments made  Y PA [facility]. Routed to PA team to initiate.  Y Post-Op outreach pt reminder placed.   Y Entire Neurosurgery Checklist Completed    Clearances: PCP  PA:[facility]   CPT Codes: 78090, 99658, 02241, 56016, 72967, 00326, 00032, 00088, 89731

## 2024-05-01 ENCOUNTER — NURSE ONLY (OUTPATIENT)
Dept: SURGERY | Facility: CLINIC | Age: 49
End: 2024-05-01
Payer: COMMERCIAL

## 2024-05-01 DIAGNOSIS — Z71.9 ENCOUNTER FOR EDUCATION: Primary | ICD-10-CM

## 2024-05-01 NOTE — TELEPHONE ENCOUNTER
Received denial from [facility] for surgery scheduled on 05/13/24 with Dr. Cardona.    Denial Reason:    The services are not eligible for coverage because your plan doesn't cover unproven procedures.  Your plan only covers proven procedures, which are:   Recognized as safe and effective for the diagnosis or treatment of a specified condition and    Based on clinical evidence published in peer-reviewed medical literature    Experimental or Investigational Services or Unproven Services:  1. Experimental or Investigational Services and Unproven Services and all services related to Experimental or Investigational and Unproven Services are excluded. The fact that an Experimental or Investigational or Unproven Service, treatment, device or pharmacological regimen is the only available treatment for a particular condition will not result in Benefits if the  procedure is considered to be experimental or Investigational or Unproven in the treatment of that  particular condition. This exclusion does not apply to Covered Health Services provided during a Clinical Trial for which Benefits are provided as described under Clinical Trials in Section 6,  Additional Coverage Details.  Here is the specific clinical reason for our decision:  Your doctor plans to decompress and fuse multiple levels in your lower back. Decompression is the removal of bone and soft tissue necessary to directly treat pinched nerves in your spine. A spinal fusion is a surgery to permanently join two or more bones in your spine, creating one solid bone  after healing. We reviewed all the information received. We reviewed your health plan medical criteria for the treatment of your spinal condition and your benefit document. Your records must show that you meet certain criteria. Your surgeon has requested to decompress and fuse multiple  levels in your lower back.  · You must have nerve pinching on CT or MRI of your lower back.  · You must have symptoms consistent  with the nerve pinching seen on the images.  · For fusion, you must have evidence of instability apparent on imaging at the levels to be operated on or likely to occur as a result of the decompression. Instability is when the bones in your spine move too much, allowing them to pinch nerves.  · The following submitted products are not currently covered by your insurance: OsteoCel. This product has not been shown to be helpful.  Fusion materials help your bones grow together. These are also referred to as fusion enhancers, substitutes, products, or grafts.  · The requested fusion services are not medically necessary. The images and notes do not support a  need for it.  Your plan does not cover the requested services. This is a full denial.     Your provider can speak with a [facility] medical director by completing a [id number] Scheduling Request Form at [employer] > Prior Authorization and Notification >  Forms > Peer to Peer Scheduling Request Form, or by calling the [employer] Support Team at [phone]. Pending Ref#[id number]      You or your representative can also request an appeal.  Please include the following information when requesting a standard appeal:   A copy of the letter you received   A written request explaining which coverage determination you want us to review and why we should reconsider our coverage determination   Any additional information that supports your position  Mail or fax this information to:  [facility] Appeals Unit  [address] [address]  [place] [address]  Standard appeal fax: [phone]  Expedited (fast) appeal fax: [phone]  If your situation is not urgent, you have 180 days to submit your request for an appeal. If you don't submit your appeal request within this timeframe, you may lose your opportunity to do so.

## 2024-05-01 NOTE — PROGRESS NOTES
RN Spine Navigator Education for [place]     If you have received instructions from your surgeon that are different from those listed below, please follow your physician's instructions.    You are scheduled for a Lumbar fusion with Dr. Cardona on 5/13.      Patient Surgical Goals: Decrease in shooting pains.     Before Your Surgery    Choose a Care Partner  Patient attended spine navigator visit independently.  Care partner is Leigh . Your care partner(s) should be able to provide transportation to and from the hospital. They should be able to help at home for the first week after discharge, including helping you with meals, medication, and dressing changes.    Clearance Before Surgery  You will need to see your primary care provider within 30 days before surgery. Please make sure this appointment is at least a week before surgery as more testing or doctor visits may be ordered. Presurgical testing may include labs, nasal swab, imaging, EKG.   Make sure that you complete all preadmission testing so that surgery does not get delayed.    Home Environment  Assessed home status: home has stairs, bathroom and bedroom are upstairs, patient has dependents at home, and patient has pets. Suggested arrangements for childcare  and pet care.  It is recommended that you prepare your home by putting clean sheets on your bed, freezing meals, and putting frequently used items at waist level.  Prevent falls by removing items that could cause you to trip, adding nightlights and adding a nonskid mat in shower.   Assistive devices can be purchased at a medical supply store or online including canes, walkers, toileting devices (commodes, raised toilet seats, toilet paper wiping aid), long handled sponge, shower chair or tub transfer bench, grabber/reacher tool, sock aid, long handled shoehorn, if needed.      Tobacco, Nicotine and Marijuana Use   No marijuana products for 24 hours before anesthesia or while taking narcotic  medications    Medications to Stop   For 7-10 days before surgery do not take any blood thinning medications. This includes non-steroidal anti-inflammatories or NSAIDs (Advil, Aleve, Motrin, ibuprofen, naproxen, meloxicam, diclofenac, celecoxib, etc.), aspirin (unless told otherwise by your cardiologist or surgeon), herbal supplements and vitamins (garlic, turmeric, vitamin E, fish oil or krill oil, etc.). You may only take Tylenol or prescribed narcotic medication if needed for pain.   Other medications to stop include:   Tadalafil for 720 hours    Leading Up to Day of Surgery  Five days before surgery wash with Hibiclens soap after your regular body soap every day. Do not put use Hibiclens on your face, hair or privates. Your last shower should be the night before surgery.  One business day before surgery, the preadmission testing staff will call you and let you know what time to arrive, where to park and will provide any additional instructions.   After 11pm the day before surgery, do not eat or drink anything (including water, gum, or candies) except for Tylenol and Gatorade.   Drink 12 ounces of regular Gatorade (NOT RED) 12 hours prior to your scheduled surgery time. Drink your second 12 ounces of regular Gatorade and take 1000 mg of Tylenol (acetaminophen) 4 hours before your scheduled surgery time.     Items to Bring to the Hospital   Bring insurance card, ID, advanced directive, or medical power of  paperwork, loose fitting clothing, shoes with a back that can accommodate swollen feet, long phone charging cord.   Do not bring jewelry, valuables, or medications.   If you take an uncommon medication that the hospital may not have, it must be brought to the hospital in the original container, and you must notify the nurse of this medication.     In the Hospital     Operative Day and Hospital Stay  In the preoperative area, you will change into a gown, have an IV placed in your hand or arm by the nurse,  and sign any consent paperwork that is needed for your procedure. You will speak to the surgeon and anesthesiologist. It is important to tell the doctors and nurses if you have had any significant side effects from pain medications or anesthesia such as a rash or severe nausea.    In the operating room, the anesthesiologist will attach monitors, give you oxygen through a mask, and give you medicine through the IV to fall asleep. After you are sleeping, the breathing tube will be placed. The surgeon may use additional nerve monitoring during your surgery. This is to make sure that the muscles and nerves in your arms and legs are working normally as he operates. The equipment will be hooked up and removed while you are asleep. You will wake up on the stretcher.     During the surgery, your care partner can sit in the surgical waiting area. There are TV screens in that area that keep them informed of your progress.     In the recovery room, monitors will be attached that check your heart rate, blood pressure and oxygen levels. While you may not remember this part, a nurse is with you and constantly checking on your condition. Medications for pain and nausea will be given if you need them. You may have a jara catheter to empty your bladder or a drain at your surgical site. Your family is not allowed in the recovery room. When you are ready to leave the recovery room, you will be transported on your stretcher to the inpatient unit accompanied by your family once a room is available.  On the inpatient unit, a team of doctors and advanced practice providers will manage your care. The spine care nurses will check your blood pressure, temperature, heartbeat, breathing, stomach sounds and your incision. They may assess the strength and sensation in your arms and legs. Medications that are given in the hospital include antibiotics, IV fluids, pain medications, muscle relaxers, stool softeners, and your home medications. You  may get blood drawn and another x-ray. Physical and occupational therapists may come to your room to teach you how to move around safely after surgery.     Post Op Plan   The average length of hospital stay is one to three days. A  may visit you to help arrange extra care for you once you leave the hospital. Occasionally, it is recommended that a home health nurse or therapist visit you in your home for a short time. The best place to recover is your own home, but if you need more assistance than home health and your care partner can provide, the  will help you and your family choose other facilities to help you recover your strength.    Preventing surgical complications  It is important to follow all instructions before and after surgery to decrease the risks of surgery and prevent complications.     Blood clots: walk, wear leg compression devices in the hospital, and do ankle pumps at home  Infection: wash with Hibiclens before surgery, wash your hands, don't touch your incision  Pneumonia: take deep breaths and cough and use the breathing exercise (incentive spirometer)   Nausea/vomiting: start with liquids and small meals and do not take pills on an empty stomach  Constipation: drink water and walk frequently    Tell your nurse if you are experiencing nausea, vomiting or constipation as they have medications to help treat these.      At home     Understanding Pain After Surgery  We will do our best to manage your pain after surgery, but it is not possible to be completely pain-free. There will be operative pain in your back or neck. Pain in the arms or legs may be one of the first things to improve. Numbness, weakness, and tingling should improve over time. However, there can be a temporary increase in symptoms in the first few days due to inflammation from surgery.   Pain medications will be prescribed to take home at discharge. The goal of pain medicine after surgery is to make your  pain tolerable, not to make you pain free. We encourage you to use the medication prescribed to you after your surgery, but please take the lowest possible dose to manage your pain. Taking high doses of narcotics can cause side effects. Transition to plain Tylenol or your regular medications when your pain improves. Speak to your prescribing provider for getting off of your  narcotics. You may get more continuous pain relief by alternating between medications if you have multiple instead of taking them at the same time. Write down when you have taken a medication as it may be difficult to remember after a few doses.    Post operative medication   Tylenol (acetaminophen): take for pain. Do not take more than 3000 mg - 4000 mg in 24 hours because it can damage your liver.   Narcotics: take for moderate to severe pain. Do not drink alcohol or drive while taking narcotics. Some narcotic medications (Norco, Tylenol #3, Percocet, Vicodin) contain Tylenol (acetaminophen). Make sure to not exceed the maximum dose if you are taking additional Tylenol with these medications.  Muscle relaxers: take for muscle cramping. These can cause drowsiness.    NSAIDS (Advil, Aleve, Motrin, ibuprofen, naproxen, meloxicam, diclofenac, celecoxib, etc.) or aspirin: Do not take these unless your physician says it is ok. For a fusion, it may be several months before you can take NSAIDS.  Stool softeners: take to prevent constipation while you are taking narcotic medications. You can get these over the counter at the pharmacy. You may use laxatives, which are stronger, if needed.    If you believe you will need more of medication your surgeon has prescribed to you, request a refill through your pharmacy or through the refill request in [employer] (log in, go to medications, then select refill request) at least two business days before you run out of medication.     Nonpharmacologic pain management   You may use ice on your incision for 20 minutes  every hour to help with pain and swelling. Do not place ice directly on your skin. You can use heat to sooth your muscles but avoid placing heat directly on your incision. Make frequent position changes. You can do gentle stretching while avoiding significant bending or twisting. Use deep breathing techniques and distractions such as TV, music, reading, or games.     Movement restrictions  After surgery, no bending or twisting your neck or back. Do not lift anything over 10lbs (about the weight of a gallon of milk) or lift anything over your shoulders. Avoid pulling or pushing. You may use stairs while holding the handrail.  It is ok to ride in the car but refrain from driving or traveling long distances until cleared to do so by your surgeon. You may not drive while taking narcotics or muscle relaxants. If you had a fracture or fusion surgery, your doctor may give you a brace. Braces are worn for comfort, when up and moving around, or constantly depending on your doctor's order. Wear your brace as instructed.     Post Op Exercise   Walk frequently. Start with walking short distances and increase as you start to feel better. Do ankle pumps (bending at your ankles, bring your feet towards your head then point them towards the ground) 15-20 times every hour when awake to help prevent blood clots.     Your surgeon will let you know at your post operative appointment if you are ready to decrease your movement restrictions and increase your exercise. If you have questions in between appointments about lessening your restrictions, please contact the office.     You and your doctor will discuss how you are feeling as you heal and decide if outpatient physical therapy or a medical fitness referral is needed.    Caring for your incision  Always wash your hands before touching your incision. Your incision will be closed with sutures under the skin and skin glue or Steri-Strips (thin white bandages) on top of the skin. Do not  attempt to peal off Skin glue/Steri-Strips as they will come off on their own. If the incision is closed with sutures or staples on top of the skin, they will be removed at a post op appointment. The incision may be covered with a gauze dressing that can come off after three days. Once the original gauze dressing is removed, we prefer that you leave your incision open to air (without a gauze dressing). If the incision has drainage or is rubbing against your clothes or brace, you may place gauze and medical tape over it. Change the gauze and medical tape daily. Look at your incision daily to check for signs of infection.  You can shower three days after your surgery or sooner if your surgeon allows. We recommend the care partner be present during the first shower for safety. Let soapy water run over the incision, but do not scrub it or spray it directly. Gently pat it dry after with a clean towel. Do not apply any creams or lotions to the incision. Do not soak in a tub, pool, or any body of water until your incision is fully healed.    Signs of Infection   Check your incision daily for swelling, redness, drainage, pus, bad smell, or opening of the incision.     When to Call for Assistance  Call the Neurosurgery Office ([phone] Option #2) if you experience signs of infection, opening of the incision, continuous nausea or vomiting, poor pain control despite using the pain medication as directed, a sudden increase in pain, temperature over 101F, difficulty swallowing, leg swelling, or with any concerns, unanswered questions, or new problems, such as new numbness/weakness/tingling.  Call 911 or go to the nearest emergency room if you experience chest pain, difficulty breathing, loss of bowel or bladder control, extreme drowsiness, or any other life-threatening situation.     Follow-up Plan   Appointments With Dr. Cardona or Isaias at 2 and 6 weeks     Answered questions regarding: positioning after surgery     You can  contact the RN Spine Navigator at [phone] or [email or web address] with additional questions or feedback on your care. It may take several business days to receive a reply so please do not call the RN spine navigator for refills or for emergencies.    Spine navigator spent 35 minutes conducting a virtual visit to provide education. Thank you for letting the RN Spine Navigator participate in your care.

## 2024-05-01 NOTE — TELEPHONE ENCOUNTER
P2P scheduled for tomorrow, 5-2-24 at 1:30 pm.      Placed on [place].     Provider informed.    Block apt slot for 1:30.

## 2024-05-02 NOTE — TELEPHONE ENCOUNTER
Surgery denied based on not meeting transforaminal lumbar interbody fusion criteria.   Current every day smoker

## 2024-05-06 NOTE — TELEPHONE ENCOUNTER
Received request for appeal to be initiated.     Appeal letter initiated and pended. Routed to CECILIA Curran.

## 2024-05-09 ENCOUNTER — LAB ENCOUNTER (OUTPATIENT)
Dept: LAB | Facility: HOSPITAL | Age: 49
End: 2024-05-09
Attending: NEUROLOGICAL SURGERY
Payer: COMMERCIAL

## 2024-05-09 DIAGNOSIS — Z01.818 PRE-OP TESTING: ICD-10-CM

## 2024-05-09 LAB
ANTIBODY SCREEN: NEGATIVE
RH BLOOD TYPE: POSITIVE

## 2024-05-09 PROCEDURE — 86900 BLOOD TYPING SEROLOGIC ABO: CPT

## 2024-05-09 PROCEDURE — 36415 COLL VENOUS BLD VENIPUNCTURE: CPT

## 2024-05-09 PROCEDURE — 86901 BLOOD TYPING SEROLOGIC RH(D): CPT

## 2024-05-09 PROCEDURE — 86850 RBC ANTIBODY SCREEN: CPT

## 2024-05-09 NOTE — TELEPHONE ENCOUNTER
Appeal letter, denial letter and medical records faxed to [facility].  Confirmation received.  Placed as urgent as surgery is scheduled for 5-13.       msg sent informing patient of pending appeal.

## 2024-05-10 NOTE — TELEPHONE ENCOUNTER
Called [facility] at [phone] spoke to Destinee RODRIGEZ ref#[id number].    She said the appeal letter wasn't received.  Please re-fax to [phone].  Can take up to 30 business days for a decision.

## 2024-05-10 NOTE — TELEPHONE ENCOUNTER
Re-faxed appeal letter and records.  Called patient to inform appeal is pending.  Will place surgery date of 5-22 on hold for him in case we do receive the approval in time.        Patient's surgery has been canceled.    Y case change request sent  N pre op canceled   N/a RN navigator apt canceled   Y post ops cancelled  Y removed on [place]  Y removed from surgery sheet

## 2024-05-15 NOTE — TELEPHONE ENCOUNTER
Called [facility] at [phone] spoke to Elisha SALAZAR ref#[id number] appeal letter received for case#[id number],  Is still in review.  Will  have decision by 5/17/24.

## 2024-05-17 NOTE — TELEPHONE ENCOUNTER
Called [facility] at [phone] spoke to Mer LANDIN regarding case#[id number] she doesn't know why Elisha told me to call back today.  Case is still in review.  Suggested we call back again next week.  Ref#[id number].

## 2024-05-17 NOTE — TELEPHONE ENCOUNTER
Called pt, no answer, left message informing per pre-auth dept request for surgery auth is still pending review with [employer]. Informed we will check with pre-auth dept again on Monday, if still pending, we will call him to cancel surgery. Informed surgery can be rescheduled once auth rcv'd from ins.

## 2024-05-17 NOTE — TELEPHONE ENCOUNTER
Patient is requesting to speak with clinical staff in regards to update on insurance determination for surgery 05/22/2024.

## 2024-05-20 NOTE — TELEPHONE ENCOUNTER
Called [facility] at [phone] spoke to Kurtis RODRIGEZ ref#[id number].  Case is still in review asked for this to be expedited since I was told there would be a decision by 5/17.  She will give the information to her supervisor who will have expedited.  Should have an answer within 24 hours.

## 2024-05-21 NOTE — TELEPHONE ENCOUNTER
Called [facility] at [phone] surgery is still in review per Jaycee ALEX

## 2024-05-23 NOTE — TELEPHONE ENCOUNTER
Surgery was cancelled.  Will reschedule once we receive authorization.     Patient's surgery cancelled    Y Case change request sent.    Y Post ops cancelled  y Updated on outlook  y Updated on surgery sheet  y routed to PA team to update

## 2024-07-09 NOTE — TELEPHONE ENCOUNTER
Called [facility] to check appeal status.  Spoke to Karol LOVE  Ref#[id number].  Appeal was denied.  Requested denial paperwork be faxed to the office.  Awaiting fax.

## 2024-07-10 NOTE — TELEPHONE ENCOUNTER
Received denial paperwork.  Copy made and sent to scan.  Copy saved at RN desk until scanning can be confirmed.     MC msg sent.

## 2024-08-01 ENCOUNTER — TELEPHONE (OUTPATIENT)
Dept: SURGERY | Facility: CLINIC | Age: 49
End: 2024-08-01

## 2024-08-01 NOTE — TELEPHONE ENCOUNTER
Received referral via Fax from [facility]  MRI disc will be send via mail to the [place] office. Patient is aware he will get a call back to schedule a follow up appointment with Dr. Cardona once we get the disc lz

## 2024-08-13 ENCOUNTER — TELEPHONE (OUTPATIENT)
Dept: SURGERY | Facility: CLINIC | Age: 49
End: 2024-08-13

## 2024-08-13 NOTE — TELEPHONE ENCOUNTER
[place] office received imaging disc and report from [facility].     07/22/2024 - MRI Lumbar Spine Wo    Films uploaded to PACS.  Copy of report sent to the scanning department.   Disc and report will be returned to the patient at his appointment with Dr. Cardona on 08/15/2024.

## 2024-08-15 ENCOUNTER — TELEPHONE (OUTPATIENT)
Dept: SURGERY | Facility: CLINIC | Age: 49
End: 2024-08-15

## 2024-08-15 ENCOUNTER — OFFICE VISIT (OUTPATIENT)
Facility: CLINIC | Age: 49
End: 2024-08-15
Payer: OTHER GOVERNMENT

## 2024-08-15 VITALS
HEIGHT: 73 IN | BODY MASS INDEX: 26.51 KG/M2 | SYSTOLIC BLOOD PRESSURE: 132 MMHG | HEART RATE: 88 BPM | WEIGHT: 200 LBS | DIASTOLIC BLOOD PRESSURE: 90 MMHG

## 2024-08-15 DIAGNOSIS — M51.37 DDD (DEGENERATIVE DISC DISEASE), LUMBOSACRAL: Primary | ICD-10-CM

## 2024-08-15 DIAGNOSIS — M51.36 DEGENERATIVE DISC DISEASE, LUMBAR: ICD-10-CM

## 2024-08-15 DIAGNOSIS — M47.27 LUMBOSACRAL SPONDYLOSIS WITH RADICULOPATHY: ICD-10-CM

## 2024-08-15 DIAGNOSIS — M47.26 OTHER SPONDYLOSIS WITH RADICULOPATHY, LUMBAR REGION: ICD-10-CM

## 2024-08-15 PROCEDURE — 99215 OFFICE O/P EST HI 40 MIN: CPT | Performed by: NEUROLOGICAL SURGERY

## 2024-08-15 NOTE — H&P
KAREN REYEZ M.D., F.A.A.N.S.     of Neurosurgery  [facility]  Board Certified Neurosurgeon                              [facility] Neurosurgery        [place] for [facility]      [address]  [address] [place] [address]    PHONE  [phone]          FAX  [phone]    [email or web address]    [facility], [address], [place]     OFFICE CONSULTATION          Louis Raymundo II  : 1975   MRN: [id number]    PCP: LUPE EVANS MD  Referring Provider: No ref. provider found     Insurance: Payor: [facility] / Plan: [facility] OPTUM / Product Type: *No Product type* /            Date of Consult:  8/15/2024    Reason for Consultation:  Our patient has been referred to our office for evaluation of: Persistent left lower extremity pain      History of Present Illness:  Louis Raymundo II is a a(n) right-handed, 49 year old, male.  This is a pleasant gentleman that I know well from the past.  He had been a surgical candidate for a lumbosacral fusion and is coming to see us today with persistent and worsening left lower extremity pain that radiates into the left hip, left thigh and then posteriorly down to the left heel.  He denies any significant right lower extremity discomfort.  He is attempted conservative treatment modalities and has been dealing with this for several years.  He denies any acute bowel or bladder changes.        History:  Past Medical History:    Anxiety state    Back problem    degenerative disc disease    Depression    Esophageal reflux    History of blood transfusion    with emergency appy, no reaction    Osteoarthritis    Visual impairment    readers      Past Surgical History:   Procedure Laterality Date    Appendectomy  1992    Colectomy      Colonoscopy  2021    Laparoscopic cholecystectomy      Other      peritonitis r/t ruptured appendix -  week later 9 inches colon removed      Family History   Problem Relation Age of Onset    Other (Other) Father         brain trauma    Cancer Mother         breast      Social History     Socioeconomic History    Marital status:      Spouse name: Not on file    Number of children: Not on file    Years of education: Not on file    Highest education level: Not on file   Occupational History    Not on file   Tobacco Use    Smoking status: Never    Smokeless tobacco: Never   Vaping Use    Vaping status: Never Used   Substance and Sexual Activity    Alcohol use: Never    Drug use: Not Currently    Sexual activity: Not on file   Other Topics Concern    Caffeine Concern Yes    Exercise Not Asked    Seat Belt Not Asked    Special Diet No    Stress Concern Not Asked    Weight Concern Not Asked   Social History Narrative    Not on file     Social Determinants of Health     Financial Resource Strain: Not on file   Food Insecurity: Low Risk  (4/28/2023)    [facility]    Food Insecurity     Have there been times that your food ran out, and you didn't have money to get more?: No     Are there times that you worry that this might happen?: No   Transportation Needs: Low Risk  (4/28/2023)    [facility]    Transportation Needs     Do you have trouble getting transportation to medical appointments?: No     How do you normally get to and from your appointments?: Not on file   Physical Activity: Not on file   Stress: Not on file   Social Connections: Not on file   Housing Stability: Not on file (4/28/2023)        Allergies:  Allergies   Allergen Reactions    Mold OTHER (SEE COMMENTS)     Note: congestion    Other OTHER (SEE COMMENTS)     Note: congestion    Pollen OTHER (SEE COMMENTS)     Note: congestion    Seasonal OTHER (SEE COMMENTS)     Note: congestion    Watermelon OTHER (SEE COMMENTS)     Note: itchy and congestion     Theophylline ITCHING and OTHER (SEE COMMENTS)       Medications:  Current Outpatient Medications   Medication Sig Dispense Refill    Tadalafil (CIALIS) 20 MG Oral Tab Take 1 tablet (20 mg total) by mouth as needed for Erectile Dysfunction. 24 tablet 1    HYDROcodone-acetaminophen  MG Oral Tab Take 1 tablet by mouth every 8 (eight) hours as needed for Pain. 90 tablet 0    cyclobenzaprine 10 MG Oral Tab Take 1 tablet (10 mg total) by mouth 2 (two) times daily as needed for Muscle spasms. 60 tablet 0    pregabalin 200 MG Oral Cap Take 1 capsule (200 mg total) by mouth 2 (two) times daily. 60 capsule 0    hydrOXYzine 50 MG Oral Tab Take 1 tablet (50 mg total) by mouth nightly as needed. 30 tablet 0    dicyclomine 10 MG Oral Cap Take 1 capsule (10 mg total) by mouth 4 (four) times daily before meals and nightly. 120 capsule 1    pantoprazole 40 MG Oral Tab EC Take 1 tablet (40 mg total) by mouth every morning before breakfast. 30 tablet 2    Rizatriptan Benzoate 10 MG Oral Tab Take 1 tablet (10 mg total) by mouth as needed for Migraine. 10 tablet 3    escitalopram 20 MG Oral Tab Take 1 tablet (20 mg total) by mouth daily. (Patient taking differently: Take 1 tablet (20 mg total) by mouth every morning.) 90 tablet 1        Review of Systems:  A 10-point system was reviewed.  Pertinent positives and negatives are noted in HPI.      Physical Exam:  BP (!) 142/92 (BP Location: Right arm, Patient Position: Sitting, Cuff Size: adult)   Pulse 104   Ht 73\"   Wt 200 lb (90.7 kg)   BMI 26.39 kg/m²        Neurological Exam:    Motor Strength:  Left plantarflexion is plus 4 out of 5 relative to the right    Sensation to light touch:  Intact and symmetric in lower extremities    DTRs:  Hypoactive lower extremity reflexes    Long tract signs:  Negative donovan  Negative babinski  Negative clonus      Abdomen:  Soft, non-distended, non-tender, with no rebound or guarding.  No peritoneal signs.     Extremities:  Non-tender, no  lower extremity edema noted.      Labs:  CBC:  Lab Results   Component Value Date    WBC 6.8 04/23/2024    HGB 17.7 (H) 04/23/2024    HCT 51.5 04/23/2024    MCV 86.0 04/23/2024    .0 04/23/2024      BMG:   Lab Results   Component Value Date     04/23/2024    K 4.8 04/23/2024    CO2 28.0 04/23/2024     04/23/2024    BUN 9 04/23/2024      INR:   Lab Results   Component Value Date    INR 1.00 04/23/2024        Diagnostics:  I reviewed an MRI of the lumbar spine with evidence of diffuse moderate to severe degenerative disc disease at L4-5 L5-S1 but also at L3-4.  There is evidence of severe left lateral recess stenosis at L3-4 associated with a disc fragment.  The disc height at L4-5 and L5-S1 is severely diminished associated with severe bilateral foraminal stenosis.     Diagnosis:  1. DDD (degenerative disc disease), lumbosacral    2. Degenerative disc disease, lumbar    3. Other spondylosis with radiculopathy, lumbar region    4. Lumbosacral spondylosis with radiculopathy        Assessment/Plan:  Louis Raymundo II is a a(n) 49 year old, male, presents with persistent spondylotic degenerative disc disease of the lumbar spine.  As previously discussed, he is a candidate for a surgical intervention.  He has attempted multiple conservative and less invasive treatment modalities in the past without success.  He is a candidate for an L3-4, L4-5 and L5-S1 laminectomy with complete facetectomies at L4-5 and L5-S1 and transforaminal lumbar interbody fusions at those levels.  We again discussed indications and details of the procedure.  We reviewed benefits and risks and we will be scheduling the surgery for 9 September pending preoperative clearance.    All questions and concerns were addressed. We appreciate the opportunity to participate in the care of this patient. Please do not hesitate to call our office ([phone]) with any issues.   This report will be submitted to the referring  provider.          Shawn Cardona M.D., F.A.A.N.S.    8/15/2024  9:37 AM    This note has been dictated utilizing voice recognition software. Unfortunately, this may lead to occasional typographical errors. If there are any questions regarding this, please do not hesitate to contact our office.

## 2024-08-15 NOTE — PATIENT INSTRUCTIONS
Refill policies:    Allow 2-3 business days for refills; controlled substances may take longer.  Contact your pharmacy at least 5 days prior to running out of medication and have them send an electronic request or submit request through the “request refill” option in your [facility] account.  Refills are not addressed on weekends; covering physicians do not authorize routine medications on weekends.  No narcotics or controlled substances are refilled after noon on Fridays or by on call physicians.  By law, narcotics must be electronically prescribed.  A 30 day supply with no refills is the maximum allowed.  If your prescription is due for a refill, you may be due for a follow up appointment.  To best provide you care, patients receiving routine medications need to be seen at least once a year.  Patients receiving narcotic/controlled substance medications need to be seen at least once every 3 months.  In the event that your preferred pharmacy does not have the requested medication in stock (e.g. Backordered), it is your responsibility to find another pharmacy that has the requested medication available.  We will gladly send a new prescription to that pharmacy at your request.    Scheduling Tests:    If your physician has ordered radiology tests such as MRI or CT scans, please contact Central Scheduling at [phone] right away to schedule the test.  Once scheduled, the [facility] Centralized Referral Team will work with your insurance carrier to obtain pre-certification or prior authorization.  Depending on your insurance carrier, approval may take 3-10 days.  It is highly recommended patients assure they have received an authorization before having a test performed.  If test is done without insurance authorization, patient may be responsible for the entire amount billed.      Precertification and Prior Authorizations:  If your physician has recommended that you have a procedure or additional testing performed the [facility]  Centralized Referral Team will contact your insurance carrier to obtain pre-certification or prior authorization.    You are strongly encouraged to contact your insurance carrier to verify that your procedure/test has been approved and is a COVERED benefit.  Although the Pending sale to [facility] Centralized Referral Team does its due diligence, the insurance carrier gives the disclaimer that \"Although the procedure is authorized, this does not guarantee payment.\"    Ultimately the patient is responsible for payment.   Thank you for your understanding in this matter.  Paperwork Completion:  If you require FMLA or disability paperwork for your recovery, please make sure to either drop it off or have it faxed to our office at [phone]. Be sure the form has your name and date of birth on it.  The form will be faxed to our Forms Department and they will complete it for you.  There is a 25$ fee for all forms that need to be filled out.  Please be aware there is a 10-14 day turnaround time.  You will need to sign a release of information (PATTI) form if your paperwork does not come with one.  You may call the Forms Department with any questions at [phone].  Their fax number is [phone].     Please expect a call from our surgery scheduler, Alea Viera, to help you schedule your back surgery on 09/09/2024 at [facility].

## 2024-08-15 NOTE — H&P (VIEW-ONLY)
KAREN REYEZ M.D., F.A.A.N.S.     of Neurosurgery  [facility]  Board Certified Neurosurgeon                              [facility] Neurosurgery        [place] for [facility]      [address]  [address] [place] [address]    PHONE  [phone]          FAX  [phone]    [email or web address]    [facility], [address], [place]     OFFICE CONSULTATION          Louis Raymundo II  : 1975   MRN: [id number]    PCP: LUPE EVANS MD  Referring Provider: No ref. provider found     Insurance: Payor: [facility] / Plan: [facility] OPTUM / Product Type: *No Product type* /            Date of Consult:  8/15/2024    Reason for Consultation:  Our patient has been referred to our office for evaluation of: Persistent left lower extremity pain      History of Present Illness:  Louis Raymundo II is a a(n) right-handed, 49 year old, male.  This is a pleasant gentleman that I know well from the past.  He had been a surgical candidate for a lumbosacral fusion and is coming to see us today with persistent and worsening left lower extremity pain that radiates into the left hip, left thigh and then posteriorly down to the left heel.  He denies any significant right lower extremity discomfort.  He is attempted conservative treatment modalities and has been dealing with this for several years.  He denies any acute bowel or bladder changes.        History:  Past Medical History:    Anxiety state    Back problem    degenerative disc disease    Depression    Esophageal reflux    History of blood transfusion    with emergency appy, no reaction    Osteoarthritis    Visual impairment    readers      Past Surgical History:   Procedure Laterality Date    Appendectomy  1992    Colectomy      Colonoscopy  2021    Laparoscopic cholecystectomy      Other      peritonitis r/t ruptured appendix -  week later 9 inches colon removed      Family History   Problem Relation Age of Onset    Other (Other) Father         brain trauma    Cancer Mother         breast      Social History     Socioeconomic History    Marital status:      Spouse name: Not on file    Number of children: Not on file    Years of education: Not on file    Highest education level: Not on file   Occupational History    Not on file   Tobacco Use    Smoking status: Never    Smokeless tobacco: Never   Vaping Use    Vaping status: Never Used   Substance and Sexual Activity    Alcohol use: Never    Drug use: Not Currently    Sexual activity: Not on file   Other Topics Concern    Caffeine Concern Yes    Exercise Not Asked    Seat Belt Not Asked    Special Diet No    Stress Concern Not Asked    Weight Concern Not Asked   Social History Narrative    Not on file     Social Determinants of Health     Financial Resource Strain: Not on file   Food Insecurity: Low Risk  (4/28/2023)    [facility]    Food Insecurity     Have there been times that your food ran out, and you didn't have money to get more?: No     Are there times that you worry that this might happen?: No   Transportation Needs: Low Risk  (4/28/2023)    [facility]    Transportation Needs     Do you have trouble getting transportation to medical appointments?: No     How do you normally get to and from your appointments?: Not on file   Physical Activity: Not on file   Stress: Not on file   Social Connections: Not on file   Housing Stability: Not on file (4/28/2023)        Allergies:  Allergies   Allergen Reactions    Mold OTHER (SEE COMMENTS)     Note: congestion    Other OTHER (SEE COMMENTS)     Note: congestion    Pollen OTHER (SEE COMMENTS)     Note: congestion    Seasonal OTHER (SEE COMMENTS)     Note: congestion    Watermelon OTHER (SEE COMMENTS)     Note: itchy and congestion     Theophylline ITCHING and OTHER (SEE COMMENTS)       Medications:  Current Outpatient Medications   Medication Sig Dispense Refill    Tadalafil (CIALIS) 20 MG Oral Tab Take 1 tablet (20 mg total) by mouth as needed for Erectile Dysfunction. 24 tablet 1    HYDROcodone-acetaminophen  MG Oral Tab Take 1 tablet by mouth every 8 (eight) hours as needed for Pain. 90 tablet 0    cyclobenzaprine 10 MG Oral Tab Take 1 tablet (10 mg total) by mouth 2 (two) times daily as needed for Muscle spasms. 60 tablet 0    pregabalin 200 MG Oral Cap Take 1 capsule (200 mg total) by mouth 2 (two) times daily. 60 capsule 0    hydrOXYzine 50 MG Oral Tab Take 1 tablet (50 mg total) by mouth nightly as needed. 30 tablet 0    dicyclomine 10 MG Oral Cap Take 1 capsule (10 mg total) by mouth 4 (four) times daily before meals and nightly. 120 capsule 1    pantoprazole 40 MG Oral Tab EC Take 1 tablet (40 mg total) by mouth every morning before breakfast. 30 tablet 2    Rizatriptan Benzoate 10 MG Oral Tab Take 1 tablet (10 mg total) by mouth as needed for Migraine. 10 tablet 3    escitalopram 20 MG Oral Tab Take 1 tablet (20 mg total) by mouth daily. (Patient taking differently: Take 1 tablet (20 mg total) by mouth every morning.) 90 tablet 1        Review of Systems:  A 10-point system was reviewed.  Pertinent positives and negatives are noted in HPI.      Physical Exam:  BP (!) 142/92 (BP Location: Right arm, Patient Position: Sitting, Cuff Size: adult)   Pulse 104   Ht 73\"   Wt 200 lb (90.7 kg)   BMI 26.39 kg/m²        Neurological Exam:    Motor Strength:  Left plantarflexion is plus 4 out of 5 relative to the right    Sensation to light touch:  Intact and symmetric in lower extremities    DTRs:  Hypoactive lower extremity reflexes    Long tract signs:  Negative donovan  Negative babinski  Negative clonus      Abdomen:  Soft, non-distended, non-tender, with no rebound or guarding.  No peritoneal signs.     Extremities:  Non-tender, no  lower extremity edema noted.      Labs:  CBC:  Lab Results   Component Value Date    WBC 6.8 04/23/2024    HGB 17.7 (H) 04/23/2024    HCT 51.5 04/23/2024    MCV 86.0 04/23/2024    .0 04/23/2024      BMG:   Lab Results   Component Value Date     04/23/2024    K 4.8 04/23/2024    CO2 28.0 04/23/2024     04/23/2024    BUN 9 04/23/2024      INR:   Lab Results   Component Value Date    INR 1.00 04/23/2024        Diagnostics:  I reviewed an MRI of the lumbar spine with evidence of diffuse moderate to severe degenerative disc disease at L4-5 L5-S1 but also at L3-4.  There is evidence of severe left lateral recess stenosis at L3-4 associated with a disc fragment.  The disc height at L4-5 and L5-S1 is severely diminished associated with severe bilateral foraminal stenosis.     Diagnosis:  1. DDD (degenerative disc disease), lumbosacral    2. Degenerative disc disease, lumbar    3. Other spondylosis with radiculopathy, lumbar region    4. Lumbosacral spondylosis with radiculopathy        Assessment/Plan:  Louis Raymundo II is a a(n) 49 year old, male, presents with persistent spondylotic degenerative disc disease of the lumbar spine.  As previously discussed, he is a candidate for a surgical intervention.  He has attempted multiple conservative and less invasive treatment modalities in the past without success.  He is a candidate for an L3-4, L4-5 and L5-S1 laminectomy with complete facetectomies at L4-5 and L5-S1 and transforaminal lumbar interbody fusions at those levels.  We again discussed indications and details of the procedure.  We reviewed benefits and risks and we will be scheduling the surgery for 9 September pending preoperative clearance.    All questions and concerns were addressed. We appreciate the opportunity to participate in the care of this patient. Please do not hesitate to call our office ([phone]) with any issues.   This report will be submitted to the referring  provider.          Shawn Cardona M.D., F.A.A.N.S.    8/15/2024  9:37 AM    This note has been dictated utilizing voice recognition software. Unfortunately, this may lead to occasional typographical errors. If there are any questions regarding this, please do not hesitate to contact our office.

## 2024-08-23 NOTE — TELEPHONE ENCOUNTER
Patient is scheduled for L3-4, L4-5, L5-S1 laminectomy. Left L4-5 and L5-S1 complete facetectomies and transforaminal lumbar interbody fusion  on 9/9/24 with Dr. Cardona at [facility].    N/A pre-op apt scheduled (if sx is more than 30 days from last apt)  Y pre-op apt scheduled with RN spine navigator  Y Surgical instructions reviewed by nursing staff with patient  N/A  form completed  Y Surgery order signed   Y Placed sx on surgery sheet  Y Placed on outlook calendar  Y [employer] message sent to patient with sx instructions  Y Faxed pre-op clearance request to PCP Dr. Carlee Ray   N/A Faxed letter to prescribing provider requesting anticoagulants be held for surgery  N/A E-mail sent to [employer]  Y Post-op appointments made  Y CECILIA Murry. Routed to PA team to initiate.  N/A Post-Op outreach pt reminder placed.   Y Entire Neurosurgery Checklist Completed    Clearances: pending   PA: pending   CPT Codes: 45728, 74411, 31250, 64251, 08682, 75081, 54982, 65291, 53860

## 2024-08-23 NOTE — TELEPHONE ENCOUNTER
You are scheduled for L3-4, L4-5, L5-S1 laminectomy. Left L4-5 and L5-S1 complete facetectomies and transforaminal lumbar interbody fusion  on 9/9/24 with Dr. Cardona at [facility].    PCP clearance is needed within 30 days of surgery.  We have faxed a request for pre-op clearance to your primary care physician Dr. Carlee Ray. Please contact their office for appointment.  Please schedule this appointment AT LEAST 1 WEEK PRIOR TO SURGERY DATE.  Your PCP may order additional testing or clearance from another specialist.   You will have an appointment with our RN Spine Navigator prior to surgery.  This is an educational telehealth/phone visit in which you will receive more information on the specifics of your surgery, instructions for before surgery, what to expect in the hospital and how to take care of yourself after surgery.  Your surgeon wants you to to participate in this visit so that you are as prepared for surgery as possible and have an opportunity to ask questions.  If possible, we would like your care partner to be present for the visit as well.    You will need to contact the Pre-admission department at [phone]. The Pre-Admission nurse will review your health history and give you information for day-of instructions. The nurse will also get you scheduled for all your pre-op testing required for your surgery.   You will need pre-operative labs which will include blood work and a MRSA/MSSA test (nasal swab). You will need for fast for the blood work. You may also need an EKG and/or chest x-ray depending on your current health status.    Please discuss this with the prescribing physician.  Do not take any blood thinning medications such as over the counter NSAIDS (advil, aleve, ibuprofen etc.), herbal supplements (garlic,tumeric etc.), vitamin E, fish oil or krill oil for at least 7 days prior to surgery.  If you have questions about your other medications, please call our office or send a [employer]  message.   You should have nothing to eat or drink after 11:00pm the night prior to surgery except for the following:  Do drink 12 ounces of regular Gatorade (NOT RED) 12 hours and 4 hours prior to your scheduled surgery time. Do not drink any other liquids (including water) before your surgery. Take 1000 mg of Tylenol (Acetaminophen) 4 hours before your scheduled surgery time, take this with your scheduled Gatorade.  In order to prevent infection, you will need to purchase Hibiclens soap and use it after your regular body soap for 5 days prior to your procedure.  The last shower should be the night before surgery.  This soap can be found at any pharmacy in the first aid section. See detailed instructions below.    Our office will get prior authorization for surgery through your insurance.   Surgery is usually scheduled as a 1-2 day admission.  This is an estimate and varies from person to person.  Ultimately, the surgeon will determine when you are ready to be discharged.  The hospital will contact you 1-2 days before surgery with your arrival time.       If you require FMLA or disability paperwork for your recovery, please make sure to either drop it off or have it faxed to our office at [phone]. Be sure the form has your name and date of birth on it.  The form will be faxed to our Forms Department and they will complete it for you.  There is a 25$ fee for all forms that need to be filled out.  Please be aware there is a 10-14 day turnaround time.  You will need to sign a release of information (PATTI) form if your paperwork does not come with one.  You may call the Forms Department with any questions at [phone].  Their fax number is [phone].     POST OP CARE--First Two Weeks  No bending at waist, twisting, pushing or pulling  No lifting more than 10 lb (a gallon of milk)  Wear cervical collar/lumbar brace, if prescribed, as instructed by surgeon  No overhead reaching  No driving until approved by  your surgeon   Change positions frequently. No prolonged sitting or standing.  Increase walking as tolerated to avoid blood clots  No NSAIDs until approved by surgeon (ibuprofen, aleve, advil, meloxicam, Celebrex, diclofenac etc).   Remove any bandage on post op day 3.  Leave incision open to air.  Glue will fall off on its own.  If you have staples or sutures that are not dissolvable, these will be removed at your 2 or 3 week post op visit.   Check your incision for signs of infection daily (redness, warmth, drainage, increased pain at incision site, fever)  Do not shower until post op day 3.  Do not allow water pressure to directly hit the incision.  Do not scrub the incision and avoid any lotion, creams or perfume near the incision site.  No swimming, hot tubs or baths until your incision is completely healed.  Take pain medication as prescribed, if needed.  Constipation is a common side effect of opioids.  If you experience constipation, drink plenty of water and take over-the-counter stool softeners daily.   Avoid nicotine and alcohol   When to call the office:  Increased or change in location of pain  Increased weakness in arms or legs  Incision drainage, redness or warmth  Fever  Bowel or bladder changes   Choking on food or liquids (cervical)  Pain, redness, swelling, color changes or warmth in lower leg    Hibiclens Bathing  Hibiclens is a body soap that is used before surgery to protect you from getting an infection post-operatively  Hibiclens comes in a large blue bottle and can be found in most pharmacies in the First Aid supplies  Shower with this daily for FIVE consecutive days before surgery, using the entire bottle over the five days.  The last shower should be the night before surgery.   Steps to bathing with Hibiclens  Do not use Hibiclens on your hair, face or private areas  Wash your hair and body as normal with your usual cleansers  Rinse well  Using a clean wet washcloth apply enough Hibiclens  to cover your body. Wash from the neck down avoiding the genital areas and concentrating on the surgical area  Rinse well  Dry yourself with a clean, dry towel  Do not use any powders, creams, lotions or sprays on your body as these attract bacteria  Deodorant, hand lotion and facial creams are acceptable.     For Office Use Only:    Medical Clearances Needed: pending   PA: pending   CPT Codes: 36033, 96111, 23208, 00611, 34570, 97213, 68402, 64838, 64203

## 2024-08-27 NOTE — TELEPHONE ENCOUNTER
Prior authorization request completed for: L3-4, L4-5, L5-S1 laminectomy. Left L4-5 and L5-S1 complete facetectomies and transforaminal lumbar interbody fusion   Authorization #referral#[id number] surgery approved refer to service procedure #8 on referral   Authorization dates: 09/09/24  CPT codes approved: 16915, 51412, 54426, 04706, 33453, 89428, 73745, 55868, 79798  Number of visits/dates of service approved: Inpatient   Physician: Brendan   Location: [facility]     Call Ref#: Lisa ROPER   Representative Name: Lisa ROPER   Insurance Carrier: [facility]

## 2024-08-28 ENCOUNTER — NURSE ONLY (OUTPATIENT)
Dept: SURGERY | Facility: CLINIC | Age: 49
End: 2024-08-28
Payer: OTHER GOVERNMENT

## 2024-08-28 DIAGNOSIS — Z71.9 ENCOUNTER FOR EDUCATION: Primary | ICD-10-CM

## 2024-08-28 NOTE — PROGRESS NOTES
RN Spine Navigator Education for [place]     If you have received instructions from your surgeon that are different from those listed below, please follow your physician's instructions.    You are scheduled for a Lumbar fusion and Lumbar decompression with Dr. Cardona on 9/9.      Patient Surgical Goals: Decreased pain/pressure in back and legs. Discontinue pain medications.     Before Your Surgery    Choose a Care Partner  Patient attended spine navigator visit independently.  Care partner is daughter and son. Your care partner(s) should be able to provide transportation to and from the hospital. They should be able to help at home for the first week after discharge, including helping you with meals, medication, and dressing changes.    Clearance Before Surgery  You will need to see your primary care provider within 30 days before surgery. Please make sure this appointment is at least a week before surgery as more testing or doctor visits may be ordered. Presurgical testing may include labs, nasal swab, imaging, EKG.   Make sure that you complete all preadmission testing so that surgery does not get delayed.    Home Environment  Assessed home status: home has stairs, bathroom and bedroom are upstairs, and patient has pets. Suggested arrangements for pet care and help at home.  It is recommended that you prepare your home by putting clean sheets on your bed, freezing meals, and putting frequently used items at waist level.  Prevent falls by removing items that could cause you to trip, adding nightlights and adding a nonskid mat in shower.   Assistive devices can be purchased at a medical supply store or online including canes, walkers, toileting devices (commodes, raised toilet seats, toilet paper wiping aid), long handled sponge, shower chair or tub transfer bench, grabber/reacher tool, sock aid, long handled shoehorn, if needed.      Tobacco, Nicotine and Marijuana Use   Not applicable and No marijuana products for 24  hours before anesthesia or while taking narcotic medications    Medications to Stop   For 7-10 days before surgery do not take any blood thinning medications. This includes non-steroidal anti-inflammatories or NSAIDs (Advil, Aleve, Motrin, ibuprofen, naproxen, meloxicam, diclofenac, celecoxib, etc.), aspirin (unless told otherwise by your cardiologist or surgeon), herbal supplements and vitamins (garlic, turmeric, vitamin E, fish oil or krill oil, etc.). You may only take Tylenol or prescribed narcotic medication if needed for pain.   Other medications to stop include: Cialis hold for 72 hours.     Leading Up to Day of Surgery  Five days before surgery wash with Hibiclens soap after your regular body soap every day. Do not put use Hibiclens on your face, hair or privates. Your last shower should be the night before surgery.  One business day before surgery, the preadmission testing staff will call you and let you know what time to arrive, where to park and will provide any additional instructions.   After 11pm the day before surgery, do not eat or drink anything (including water, gum, or candies) except for Tylenol and Gatorade.   Drink 12 ounces of regular Gatorade (NOT RED) 12 hours prior to your scheduled surgery time. Drink your second 12 ounces of regular Gatorade and take 1000 mg of Tylenol (acetaminophen) 4 hours before your scheduled surgery time.     Items to Bring to the Hospital   Bring insurance card, ID, advanced directive, or medical power of  paperwork, loose fitting clothing, shoes with a back that can accommodate swollen feet, long phone charging cord.   Do not bring jewelry, valuables, or medications.   If you take an uncommon medication that the hospital may not have, it must be brought to the hospital in the original container, and you must notify the nurse of this medication.     In the Hospital     Operative Day and Hospital Stay  In the preoperative area, you will change into a gown,  have an IV placed in your hand or arm by the nurse, and sign any consent paperwork that is needed for your procedure. You will speak to the surgeon and anesthesiologist. It is important to tell the doctors and nurses if you have had any significant side effects from pain medications or anesthesia such as a rash or severe nausea.    In the operating room, the anesthesiologist will attach monitors, give you oxygen through a mask, and give you medicine through the IV to fall asleep. After you are sleeping, the breathing tube will be placed. The surgeon may use additional nerve monitoring during your surgery. This is to make sure that the muscles and nerves in your arms and legs are working normally as he operates. The equipment will be hooked up and removed while you are asleep. You will wake up on the stretcher.     During the surgery, your care partner can sit in the surgical waiting area. There are TV screens in that area that keep them informed of your progress.     In the recovery room, monitors will be attached that check your heart rate, blood pressure and oxygen levels. While you may not remember this part, a nurse is with you and constantly checking on your condition. Medications for pain and nausea will be given if you need them. You may have a jara catheter to empty your bladder or a drain at your surgical site. Your family is not allowed in the recovery room. When you are ready to leave the recovery room, you will be transported on your stretcher to the inpatient unit accompanied by your family once a room is available.  On the inpatient unit, a team of doctors and advanced practice providers will manage your care. The spine care nurses will check your blood pressure, temperature, heartbeat, breathing, stomach sounds and your incision. They may assess the strength and sensation in your arms and legs. Medications that are given in the hospital include antibiotics, IV fluids, pain medications, muscle  relaxers, stool softeners, and your home medications. You may get blood drawn and another x-ray. Physical and occupational therapists may come to your room to teach you how to move around safely after surgery.     Post Op Plan   The average length of hospital stay is one to three days. A  may visit you to help arrange extra care for you once you leave the hospital. Occasionally, it is recommended that a home health nurse or therapist visit you in your home for a short time. The best place to recover is your own home, but if you need more assistance than home health and your care partner can provide, the  will help you and your family choose other facilities to help you recover your strength.    Preventing surgical complications  It is important to follow all instructions before and after surgery to decrease the risks of surgery and prevent complications.     Blood clots: walk, wear leg compression devices in the hospital, and do ankle pumps at home  Infection: wash with Hibiclens before surgery, wash your hands, don't touch your incision  Pneumonia: take deep breaths and cough and use the breathing exercise (incentive spirometer)   Nausea/vomiting: start with liquids and small meals and do not take pills on an empty stomach  Constipation: drink water and walk frequently    Tell your nurse if you are experiencing nausea, vomiting or constipation as they have medications to help treat these.      At home     Understanding Pain After Surgery  We will do our best to manage your pain after surgery, but it is not possible to be completely pain-free. There will be operative pain in your back or neck. Pain in the arms or legs may be one of the first things to improve. Numbness, weakness, and tingling should improve over time. However, there can be a temporary increase in symptoms in the first few days due to inflammation from surgery.   Pain medications will be prescribed to take home at discharge.  The goal of pain medicine after surgery is to make your pain tolerable, not to make you pain free. We encourage you to use the medication prescribed to you after your surgery, but please take the lowest possible dose to manage your pain. Taking high doses of narcotics can cause side effects. Transition to plain Tylenol or your baseline medicines when your pain improves. You may get more continuous pain relief by alternating between medications if you have multiple instead of taking them at the same time. Write down when you have taken a medication as it may be difficult to remember after a few doses.    Post operative medication   Tylenol (acetaminophen): take for pain. Do not take more than 3000 mg - 4000 mg in 24 hours because it can damage your liver.   Narcotics: take for moderate to severe pain. Do not drink alcohol or drive while taking narcotics. Some narcotic medications (Norco, Tylenol #3, Percocet, Vicodin) contain Tylenol (acetaminophen). Make sure to not exceed the maximum dose if you are taking additional Tylenol with these medications.  Muscle relaxers: take for muscle cramping. These can cause drowsiness.    NSAIDS (Advil, Aleve, Motrin, ibuprofen, naproxen, meloxicam, diclofenac, celecoxib, etc.) or aspirin: Do not take these unless your physician says it is ok. For a fusion, it may be several months before you can take NSAIDS.  Stool softeners: take to prevent constipation while you are taking narcotic medications. You can get these over the counter at the pharmacy. You may use laxatives, which are stronger, if needed.    If you believe you will need more of medication your surgeon has prescribed to you, request a refill through your pharmacy or through the refill request in [employer] (log in, go to medications, then select refill request) at least two business days before you run out of medication.     Nonpharmacologic pain management   You may use ice on your incision for 20 minutes every hour to  help with pain and swelling. Do not place ice directly on your skin. You can use heat to sooth your muscles but avoid placing heat directly on your incision. Make frequent position changes. You can do gentle stretching while avoiding significant bending or twisting. Use deep breathing techniques and distractions such as TV, music, reading, or games.     Movement restrictions  After surgery, no bending or twisting your neck or back. Do not lift anything over 10lbs (about the weight of a gallon of milk) or lift anything over your shoulders. Avoid pulling or pushing. You may use stairs while holding the handrail.  It is ok to ride in the car but refrain from driving or traveling long distances until cleared to do so by your surgeon. You may not drive while taking narcotics or muscle relaxants. If you had a fracture or fusion surgery, your doctor may give you a brace. Braces are worn for comfort, when up and moving around, or constantly depending on your doctor's order. Wear your brace as instructed.     Post Op Exercise   Walk frequently. Start with walking short distances and increase as you start to feel better. Do ankle pumps (bending at your ankles, bring your feet towards your head then point them towards the ground) 15-20 times every hour when awake to help prevent blood clots.     Your surgeon will let you know at your post operative appointment if you are ready to decrease your movement restrictions and increase your exercise. If you have questions in between appointments about lessening your restrictions, please contact the office.     You and your doctor will discuss how you are feeling as you heal and decide if outpatient physical therapy or a medical fitness referral is needed.    Caring for your incision  Always wash your hands before touching your incision. Your incision will be closed with sutures under the skin and skin glue or Steri-Strips (thin white bandages) on top of the skin. Do not attempt to  peal off Skin glue/Steri-Strips as they will come off on their own. If the incision is closed with sutures or staples on top of the skin, they will be removed at a post op appointment. The incision may be covered with a gauze dressing that can come off after three days. Once the original gauze dressing is removed, we prefer that you leave your incision open to air (without a gauze dressing). If the incision has drainage or is rubbing against your clothes or brace, you may place gauze and medical tape over it. Change the gauze and medical tape daily. Look at your incision daily to check for signs of infection.  You can shower three days after your surgery or sooner if your surgeon allows. We recommend the care partner be present during the first shower for safety. Let soapy water run over the incision, but do not scrub it or spray it directly. Gently pat it dry after with a clean towel. Do not apply any creams or lotions to the incision. Do not soak in a tub, pool, or any body of water until your incision is fully healed.    Signs of Infection   Check your incision daily for swelling, redness, drainage, pus, bad smell, or opening of the incision.     When to Call for Assistance  Call the Neurosurgery Office ([phone] Option #2) if you experience signs of infection, opening of the incision, continuous nausea or vomiting, poor pain control despite using the pain medication as directed, a sudden increase in pain, temperature over 101F, difficulty swallowing, leg swelling, or with any concerns, unanswered questions, or new problems, such as new numbness/weakness/tingling.  Call 911 or go to the nearest emergency room if you experience chest pain, difficulty breathing, loss of bowel or bladder control, extreme drowsiness, or any other life-threatening situation.    Follow-up Plan   Appointments With Dr. Cardona or Isaias at 2 and 6 weeks     Answered questions regarding: None      You can contact the RN Spine Navigator at  [phone] or [email or web address] with additional questions or feedback on your care. It may take several business days to receive a reply so please do not call the RN spine navigator for refills or for emergencies.    Spine navigator spent 39 minutes conducting a virtual visit to provide education. Thank you for letting the RN Spine Navigator participate in your care.

## 2024-08-28 NOTE — TELEPHONE ENCOUNTER
S/w: PCP office, stated pt is not scheduled for preop exam. Called pt who stated [facility]24 [facility] [place]

## 2024-09-03 NOTE — TELEPHONE ENCOUNTER
Rcv'd fax from [facility] dated 8/29/24, \"patient is a healthy adult and is medically stable to undergo lumbar spinal surgery.\"    Faxed to [facility], placed in scanning bin.     -still need labs and EKG

## 2024-09-04 NOTE — TELEPHONE ENCOUNTER
S/w: Lisa at PCP office who stated she will fax labs today, she does not see an EKG in the chart. She will f/u with pt and send a message to PCP/Nurse to confirm if pt had EKG.

## 2024-09-05 NOTE — TELEPHONE ENCOUNTER
Received lab esults from PCP.  Faxed to PAT.  Copy made and sent to scan.  Copy saved at RN desk until scanning can be confirmed.     Noted that patient did have a stress test on 4-19-24 at [facility], in media tab.

## 2024-09-05 NOTE — PAT NURSING NOTE
Received faxed preop lab resultsf rom surgeon's office but print is very small To scan .S/w Anila who [facility] if [facility] be ref[facility] with a larger font .

## 2024-09-06 ENCOUNTER — LAB ENCOUNTER (OUTPATIENT)
Dept: LAB | Facility: HOSPITAL | Age: 49
End: 2024-09-06
Attending: NEUROLOGICAL SURGERY
Payer: OTHER GOVERNMENT

## 2024-09-06 DIAGNOSIS — Z01.818 PREOP TESTING: ICD-10-CM

## 2024-09-06 DIAGNOSIS — M51.36 DEGENERATIVE DISC DISEASE, LUMBAR: ICD-10-CM

## 2024-09-06 DIAGNOSIS — M47.26 OTHER SPONDYLOSIS WITH RADICULOPATHY, LUMBAR REGION: ICD-10-CM

## 2024-09-06 DIAGNOSIS — M51.37 DDD (DEGENERATIVE DISC DISEASE), LUMBOSACRAL: ICD-10-CM

## 2024-09-06 LAB
ANTIBODY SCREEN: NEGATIVE
APTT PPP: 29.3 SECONDS (ref 23–36)
INR BLD: 0.96 (ref 0.8–1.2)
MRSA DNA SPEC QL NAA+PROBE: NEGATIVE
PROTHROMBIN TIME: 13.4 SECONDS (ref 11.6–14.8)
RH BLOOD TYPE: POSITIVE

## 2024-09-06 PROCEDURE — 86901 BLOOD TYPING SEROLOGIC RH(D): CPT

## 2024-09-06 PROCEDURE — 87641 MR-STAPH DNA AMP PROBE: CPT

## 2024-09-06 PROCEDURE — 86900 BLOOD TYPING SEROLOGIC ABO: CPT

## 2024-09-06 PROCEDURE — 85610 PROTHROMBIN TIME: CPT | Performed by: NEUROLOGICAL SURGERY

## 2024-09-06 PROCEDURE — 86850 RBC ANTIBODY SCREEN: CPT

## 2024-09-06 PROCEDURE — 85730 THROMBOPLASTIN TIME PARTIAL: CPT | Performed by: NEUROLOGICAL SURGERY

## 2024-09-06 PROCEDURE — 36415 COLL VENOUS BLD VENIPUNCTURE: CPT | Performed by: NEUROLOGICAL SURGERY

## 2024-09-06 NOTE — TELEPHONE ENCOUNTER
PAT calling , please call back asap as pt's sx is this Monday, unable to transfer to [facility] or [phone]

## 2024-09-06 NOTE — TELEPHONE ENCOUNTER
Called PAT.  The patient did not complete a PTT, PT/INR, MRSA test or Type and screen.  Orders placed.  Called patient who will head to our lab now to complete testing.

## 2024-09-09 ENCOUNTER — ANESTHESIA (OUTPATIENT)
Dept: SURGERY | Facility: HOSPITAL | Age: 49
End: 2024-09-09
Payer: OTHER GOVERNMENT

## 2024-09-09 ENCOUNTER — APPOINTMENT (OUTPATIENT)
Dept: GENERAL RADIOLOGY | Facility: HOSPITAL | Age: 49
End: 2024-09-09
Attending: NEUROLOGICAL SURGERY
Payer: OTHER GOVERNMENT

## 2024-09-09 ENCOUNTER — HOSPITAL ENCOUNTER (INPATIENT)
Facility: HOSPITAL | Age: 49
LOS: 4 days | Discharge: HOME | End: 2024-09-13
Attending: NEUROLOGICAL SURGERY | Admitting: NEUROLOGICAL SURGERY
Payer: OTHER GOVERNMENT

## 2024-09-09 ENCOUNTER — ANESTHESIA EVENT (OUTPATIENT)
Dept: SURGERY | Facility: HOSPITAL | Age: 49
End: 2024-09-09
Payer: OTHER GOVERNMENT

## 2024-09-09 DIAGNOSIS — Z98.1 S/P LUMBAR FUSION: Primary | ICD-10-CM

## 2024-09-09 DIAGNOSIS — Z01.818 PREOP TESTING: ICD-10-CM

## 2024-09-09 PROCEDURE — 01NR0ZZ RELEASE SACRAL NERVE, OPEN APPROACH: ICD-10-PCS | Performed by: NEUROLOGICAL SURGERY

## 2024-09-09 PROCEDURE — 0ST20ZZ RESECTION OF LUMBAR VERTEBRAL DISC, OPEN APPROACH: ICD-10-PCS | Performed by: NEUROLOGICAL SURGERY

## 2024-09-09 PROCEDURE — 0SG00AJ FUSION OF LUMBAR VERTEBRAL JOINT WITH INTERBODY FUSION DEVICE, POSTERIOR APPROACH, ANTERIOR COLUMN, OPEN APPROACH: ICD-10-PCS | Performed by: NEUROLOGICAL SURGERY

## 2024-09-09 PROCEDURE — 0SG3071 FUSION OF LUMBOSACRAL JOINT WITH AUTOLOGOUS TISSUE SUBSTITUTE, POSTERIOR APPROACH, POSTERIOR COLUMN, OPEN APPROACH: ICD-10-PCS | Performed by: NEUROLOGICAL SURGERY

## 2024-09-09 PROCEDURE — 01NB0ZZ RELEASE LUMBAR NERVE, OPEN APPROACH: ICD-10-PCS | Performed by: NEUROLOGICAL SURGERY

## 2024-09-09 PROCEDURE — 4A11X4G MONITORING OF PERIPHERAL NERVOUS ELECTRICAL ACTIVITY, INTRAOPERATIVE, EXTERNAL APPROACH: ICD-10-PCS | Performed by: NEUROLOGICAL SURGERY

## 2024-09-09 PROCEDURE — 0ST40ZZ RESECTION OF LUMBOSACRAL DISC, OPEN APPROACH: ICD-10-PCS | Performed by: NEUROLOGICAL SURGERY

## 2024-09-09 PROCEDURE — 0SG0071 FUSION OF LUMBAR VERTEBRAL JOINT WITH AUTOLOGOUS TISSUE SUBSTITUTE, POSTERIOR APPROACH, POSTERIOR COLUMN, OPEN APPROACH: ICD-10-PCS | Performed by: NEUROLOGICAL SURGERY

## 2024-09-09 PROCEDURE — 76000 FLUOROSCOPY <1 HR PHYS/QHP: CPT | Performed by: NEUROLOGICAL SURGERY

## 2024-09-09 PROCEDURE — 94799 UNLISTED PULMONARY SVC/PX: CPT

## 2024-09-09 PROCEDURE — 0SG30AJ FUSION OF LUMBOSACRAL JOINT WITH INTERBODY FUSION DEVICE, POSTERIOR APPROACH, ANTERIOR COLUMN, OPEN APPROACH: ICD-10-PCS | Performed by: NEUROLOGICAL SURGERY

## 2024-09-09 DEVICE — TLX20, 7X11X31MM 20°
Type: IMPLANTABLE DEVICE | Site: BACK | Status: FUNCTIONAL
Brand: TLX

## 2024-09-09 DEVICE — ROD SPNL POST THORACOLUMBOSACRAL 5.5X60MM: Type: IMPLANTABLE DEVICE | Site: BACK | Status: FUNCTIONAL

## 2024-09-09 DEVICE — PROTEIOS XL 10CC: Type: IMPLANTABLE DEVICE | Site: BACK | Status: FUNCTIONAL

## 2024-09-09 DEVICE — SCREW SPNL 5.5MM LOK OPN TULIP RELINE: Type: IMPLANTABLE DEVICE | Site: BACK | Status: FUNCTIONAL

## 2024-09-09 DEVICE — SCREW SPNL 7.5X50MM 2C REDUC RELINE: Type: IMPLANTABLE DEVICE | Site: BACK | Status: FUNCTIONAL

## 2024-09-09 DEVICE — K WIRE FIX NIT BVL BLNT TIP PRECEPT: Type: IMPLANTABLE DEVICE | Site: BACK

## 2024-09-09 DEVICE — SCREW SPNL 6.5X55MM 2C REDUC RELINE: Type: IMPLANTABLE DEVICE | Site: BACK | Status: FUNCTIONAL

## 2024-09-09 DEVICE — GRAFT BNE SUB MTRX C OSTEOCEL PRO: Type: IMPLANTABLE DEVICE | Site: BACK | Status: FUNCTIONAL

## 2024-09-09 DEVICE — ROD SPNL 5.5X70MM POST: Type: IMPLANTABLE DEVICE | Site: BACK | Status: FUNCTIONAL

## 2024-09-09 RX ORDER — OXYCODONE HYDROCHLORIDE 5 MG/1
5 TABLET ORAL EVERY 4 HOURS PRN
Status: DISCONTINUED | OUTPATIENT
Start: 2024-09-09 | End: 2024-09-10

## 2024-09-09 RX ORDER — DICYCLOMINE HYDROCHLORIDE 10 MG/1
10 CAPSULE ORAL
Status: CANCELLED | OUTPATIENT
Start: 2024-09-09

## 2024-09-09 RX ORDER — RIZATRIPTAN BENZOATE 10 MG/1
10 TABLET ORAL DAILY PRN
Status: CANCELLED | OUTPATIENT
Start: 2024-09-09

## 2024-09-09 RX ORDER — ACETAMINOPHEN 500 MG
500 TABLET ORAL EVERY 4 HOURS PRN
Qty: 120 TABLET | Refills: 0 | Status: SHIPPED | OUTPATIENT
Start: 2024-09-09 | End: 2024-09-29

## 2024-09-09 RX ORDER — HYDROMORPHONE HYDROCHLORIDE 1 MG/ML
0.4 INJECTION, SOLUTION INTRAMUSCULAR; INTRAVENOUS; SUBCUTANEOUS EVERY 2 HOUR PRN
Status: DISCONTINUED | OUTPATIENT
Start: 2024-09-09 | End: 2024-09-12

## 2024-09-09 RX ORDER — PHENYLEPHRINE HCL 10 MG/ML
VIAL (ML) INJECTION AS NEEDED
Status: DISCONTINUED | OUTPATIENT
Start: 2024-09-09 | End: 2024-09-09 | Stop reason: SURG

## 2024-09-09 RX ORDER — GLYCOPYRROLATE 0.2 MG/ML
INJECTION, SOLUTION INTRAMUSCULAR; INTRAVENOUS AS NEEDED
Status: DISCONTINUED | OUTPATIENT
Start: 2024-09-09 | End: 2024-09-09 | Stop reason: SURG

## 2024-09-09 RX ORDER — DOCUSATE SODIUM 100 MG/1
100 CAPSULE, LIQUID FILLED ORAL 2 TIMES DAILY
Status: DISCONTINUED | OUTPATIENT
Start: 2024-09-09 | End: 2024-09-13

## 2024-09-09 RX ORDER — HYDROMORPHONE HYDROCHLORIDE 1 MG/ML
0.4 INJECTION, SOLUTION INTRAMUSCULAR; INTRAVENOUS; SUBCUTANEOUS EVERY 5 MIN PRN
Status: DISCONTINUED | OUTPATIENT
Start: 2024-09-09 | End: 2024-09-09 | Stop reason: HOSPADM

## 2024-09-09 RX ORDER — ONDANSETRON 2 MG/ML
4 INJECTION INTRAMUSCULAR; INTRAVENOUS EVERY 6 HOURS PRN
Status: DISCONTINUED | OUTPATIENT
Start: 2024-09-09 | End: 2024-09-13

## 2024-09-09 RX ORDER — ACETAMINOPHEN 500 MG
1000 TABLET ORAL ONCE
Status: COMPLETED | OUTPATIENT
Start: 2024-09-09 | End: 2024-09-09

## 2024-09-09 RX ORDER — LIDOCAINE HYDROCHLORIDE 10 MG/ML
INJECTION, SOLUTION EPIDURAL; INFILTRATION; INTRACAUDAL; PERINEURAL AS NEEDED
Status: DISCONTINUED | OUTPATIENT
Start: 2024-09-09 | End: 2024-09-09 | Stop reason: SURG

## 2024-09-09 RX ORDER — MORPHINE SULFATE 4 MG/ML
2 INJECTION, SOLUTION INTRAMUSCULAR; INTRAVENOUS EVERY 10 MIN PRN
Status: DISCONTINUED | OUTPATIENT
Start: 2024-09-09 | End: 2024-09-09 | Stop reason: HOSPADM

## 2024-09-09 RX ORDER — HYDROMORPHONE HYDROCHLORIDE 1 MG/ML
0.6 INJECTION, SOLUTION INTRAMUSCULAR; INTRAVENOUS; SUBCUTANEOUS EVERY 5 MIN PRN
Status: DISCONTINUED | OUTPATIENT
Start: 2024-09-09 | End: 2024-09-09 | Stop reason: HOSPADM

## 2024-09-09 RX ORDER — PROCHLORPERAZINE EDISYLATE 5 MG/ML
5 INJECTION INTRAMUSCULAR; INTRAVENOUS EVERY 8 HOURS PRN
Status: DISCONTINUED | OUTPATIENT
Start: 2024-09-09 | End: 2024-09-13

## 2024-09-09 RX ORDER — POLYETHYLENE GLYCOL 3350 17 G/17G
17 POWDER, FOR SOLUTION ORAL DAILY PRN
Status: DISCONTINUED | OUTPATIENT
Start: 2024-09-09 | End: 2024-09-13

## 2024-09-09 RX ORDER — BUPIVACAINE HYDROCHLORIDE AND EPINEPHRINE 2.5; 5 MG/ML; UG/ML
INJECTION, SOLUTION INFILTRATION; PERINEURAL AS NEEDED
Status: DISCONTINUED | OUTPATIENT
Start: 2024-09-09 | End: 2024-09-09 | Stop reason: HOSPADM

## 2024-09-09 RX ORDER — ONDANSETRON 2 MG/ML
INJECTION INTRAMUSCULAR; INTRAVENOUS AS NEEDED
Status: DISCONTINUED | OUTPATIENT
Start: 2024-09-09 | End: 2024-09-09 | Stop reason: SURG

## 2024-09-09 RX ORDER — HYDROMORPHONE HYDROCHLORIDE 1 MG/ML
0.8 INJECTION, SOLUTION INTRAMUSCULAR; INTRAVENOUS; SUBCUTANEOUS EVERY 2 HOUR PRN
Status: DISCONTINUED | OUTPATIENT
Start: 2024-09-09 | End: 2024-09-12

## 2024-09-09 RX ORDER — SODIUM CHLORIDE, SODIUM LACTATE, POTASSIUM CHLORIDE, CALCIUM CHLORIDE 600; 310; 30; 20 MG/100ML; MG/100ML; MG/100ML; MG/100ML
INJECTION, SOLUTION INTRAVENOUS CONTINUOUS
Status: DISCONTINUED | OUTPATIENT
Start: 2024-09-09 | End: 2024-09-10

## 2024-09-09 RX ORDER — MIDAZOLAM HYDROCHLORIDE 1 MG/ML
INJECTION INTRAMUSCULAR; INTRAVENOUS AS NEEDED
Status: DISCONTINUED | OUTPATIENT
Start: 2024-09-09 | End: 2024-09-09 | Stop reason: SURG

## 2024-09-09 RX ORDER — DICYCLOMINE HYDROCHLORIDE 10 MG/1
10 CAPSULE ORAL EVERY 4 HOURS PRN
Status: DISCONTINUED | OUTPATIENT
Start: 2024-09-09 | End: 2024-09-13

## 2024-09-09 RX ORDER — SODIUM CHLORIDE 9 MG/ML
INJECTION, SOLUTION INTRAVENOUS CONTINUOUS PRN
Status: DISCONTINUED | OUTPATIENT
Start: 2024-09-09 | End: 2024-09-09 | Stop reason: SURG

## 2024-09-09 RX ORDER — PANTOPRAZOLE SODIUM 40 MG/1
40 TABLET, DELAYED RELEASE ORAL
Status: DISCONTINUED | OUTPATIENT
Start: 2024-09-10 | End: 2024-09-13

## 2024-09-09 RX ORDER — PANTOPRAZOLE SODIUM 40 MG/1
40 TABLET, DELAYED RELEASE ORAL
Status: CANCELLED | OUTPATIENT
Start: 2024-09-10

## 2024-09-09 RX ORDER — DEXAMETHASONE SODIUM PHOSPHATE 4 MG/ML
VIAL (ML) INJECTION AS NEEDED
Status: DISCONTINUED | OUTPATIENT
Start: 2024-09-09 | End: 2024-09-09 | Stop reason: SURG

## 2024-09-09 RX ORDER — OXYCODONE HYDROCHLORIDE 5 MG/1
10 TABLET ORAL EVERY 4 HOURS PRN
Status: DISCONTINUED | OUTPATIENT
Start: 2024-09-09 | End: 2024-09-10

## 2024-09-09 RX ORDER — DIPHENHYDRAMINE HCL 25 MG
25 CAPSULE ORAL EVERY 4 HOURS PRN
Status: DISCONTINUED | OUTPATIENT
Start: 2024-09-09 | End: 2024-09-13

## 2024-09-09 RX ORDER — CYCLOBENZAPRINE HCL 10 MG
10 TABLET ORAL 3 TIMES DAILY PRN
Qty: 60 TABLET | Refills: 0 | Status: SHIPPED | OUTPATIENT
Start: 2024-09-09

## 2024-09-09 RX ORDER — METHOCARBAMOL 750 MG/1
750 TABLET, FILM COATED ORAL 3 TIMES DAILY PRN
Status: DISCONTINUED | OUTPATIENT
Start: 2024-09-09 | End: 2024-09-10

## 2024-09-09 RX ORDER — BISACODYL 10 MG
10 SUPPOSITORY, RECTAL RECTAL
Status: DISCONTINUED | OUTPATIENT
Start: 2024-09-09 | End: 2024-09-13

## 2024-09-09 RX ORDER — SENNOSIDES 8.6 MG
17.2 TABLET ORAL NIGHTLY
Status: DISCONTINUED | OUTPATIENT
Start: 2024-09-09 | End: 2024-09-13

## 2024-09-09 RX ORDER — HYDROMORPHONE HYDROCHLORIDE 1 MG/ML
0.2 INJECTION, SOLUTION INTRAMUSCULAR; INTRAVENOUS; SUBCUTANEOUS EVERY 5 MIN PRN
Status: DISCONTINUED | OUTPATIENT
Start: 2024-09-09 | End: 2024-09-09 | Stop reason: HOSPADM

## 2024-09-09 RX ORDER — SODIUM CHLORIDE, SODIUM LACTATE, POTASSIUM CHLORIDE, CALCIUM CHLORIDE 600; 310; 30; 20 MG/100ML; MG/100ML; MG/100ML; MG/100ML
INJECTION, SOLUTION INTRAVENOUS CONTINUOUS
Status: DISCONTINUED | OUTPATIENT
Start: 2024-09-09 | End: 2024-09-09 | Stop reason: HOSPADM

## 2024-09-09 RX ORDER — KETAMINE HYDROCHLORIDE 50 MG/ML
INJECTION, SOLUTION INTRAMUSCULAR; INTRAVENOUS AS NEEDED
Status: DISCONTINUED | OUTPATIENT
Start: 2024-09-09 | End: 2024-09-09 | Stop reason: SURG

## 2024-09-09 RX ORDER — EPHEDRINE SULFATE 50 MG/ML
INJECTION, SOLUTION INTRAVENOUS AS NEEDED
Status: DISCONTINUED | OUTPATIENT
Start: 2024-09-09 | End: 2024-09-09 | Stop reason: SURG

## 2024-09-09 RX ORDER — OXYCODONE HYDROCHLORIDE 10 MG/1
10 TABLET ORAL EVERY 6 HOURS PRN
Qty: 28 TABLET | Refills: 0 | Status: SHIPPED | OUTPATIENT
Start: 2024-09-09 | End: 2024-09-16

## 2024-09-09 RX ORDER — NALOXONE HYDROCHLORIDE 0.4 MG/ML
80 INJECTION, SOLUTION INTRAMUSCULAR; INTRAVENOUS; SUBCUTANEOUS AS NEEDED
Status: DISCONTINUED | OUTPATIENT
Start: 2024-09-09 | End: 2024-09-09 | Stop reason: HOSPADM

## 2024-09-09 RX ORDER — SODIUM PHOSPHATE, DIBASIC AND SODIUM PHOSPHATE, MONOBASIC 7; 19 G/230ML; G/230ML
1 ENEMA RECTAL ONCE AS NEEDED
Status: DISCONTINUED | OUTPATIENT
Start: 2024-09-09 | End: 2024-09-13

## 2024-09-09 RX ORDER — MORPHINE SULFATE 4 MG/ML
4 INJECTION, SOLUTION INTRAMUSCULAR; INTRAVENOUS EVERY 10 MIN PRN
Status: DISCONTINUED | OUTPATIENT
Start: 2024-09-09 | End: 2024-09-09 | Stop reason: HOSPADM

## 2024-09-09 RX ORDER — DIPHENHYDRAMINE HYDROCHLORIDE 50 MG/ML
25 INJECTION INTRAMUSCULAR; INTRAVENOUS EVERY 4 HOURS PRN
Status: DISCONTINUED | OUTPATIENT
Start: 2024-09-09 | End: 2024-09-13

## 2024-09-09 RX ORDER — MORPHINE SULFATE 10 MG/ML
6 INJECTION, SOLUTION INTRAMUSCULAR; INTRAVENOUS EVERY 10 MIN PRN
Status: DISCONTINUED | OUTPATIENT
Start: 2024-09-09 | End: 2024-09-09 | Stop reason: HOSPADM

## 2024-09-09 RX ADMIN — PHENYLEPHRINE HCL 100 MCG: 10 MG/ML VIAL (ML) INJECTION at 09:07:00

## 2024-09-09 RX ADMIN — SODIUM CHLORIDE, SODIUM LACTATE, POTASSIUM CHLORIDE, CALCIUM CHLORIDE: 600; 310; 30; 20 INJECTION, SOLUTION INTRAVENOUS at 07:39:00

## 2024-09-09 RX ADMIN — SODIUM CHLORIDE, SODIUM LACTATE, POTASSIUM CHLORIDE, CALCIUM CHLORIDE: 600; 310; 30; 20 INJECTION, SOLUTION INTRAVENOUS at 09:55:00

## 2024-09-09 RX ADMIN — ONDANSETRON 4 MG: 2 INJECTION INTRAMUSCULAR; INTRAVENOUS at 07:39:00

## 2024-09-09 RX ADMIN — SODIUM CHLORIDE: 9 INJECTION, SOLUTION INTRAVENOUS at 07:53:00

## 2024-09-09 RX ADMIN — DEXAMETHASONE SODIUM PHOSPHATE 8 MG: 4 MG/ML VIAL (ML) INJECTION at 07:39:00

## 2024-09-09 RX ADMIN — SODIUM CHLORIDE: 9 INJECTION, SOLUTION INTRAVENOUS at 08:37:00

## 2024-09-09 RX ADMIN — SODIUM CHLORIDE, SODIUM LACTATE, POTASSIUM CHLORIDE, CALCIUM CHLORIDE: 600; 310; 30; 20 INJECTION, SOLUTION INTRAVENOUS at 08:36:00

## 2024-09-09 RX ADMIN — MIDAZOLAM HYDROCHLORIDE 2 MG: 1 INJECTION INTRAMUSCULAR; INTRAVENOUS at 07:35:00

## 2024-09-09 RX ADMIN — EPHEDRINE SULFATE 10 MG: 50 INJECTION, SOLUTION INTRAVENOUS at 08:29:00

## 2024-09-09 RX ADMIN — SODIUM CHLORIDE: 9 INJECTION, SOLUTION INTRAVENOUS at 09:55:00

## 2024-09-09 RX ADMIN — EPHEDRINE SULFATE 10 MG: 50 INJECTION, SOLUTION INTRAVENOUS at 08:40:00

## 2024-09-09 RX ADMIN — EPHEDRINE SULFATE 10 MG: 50 INJECTION, SOLUTION INTRAVENOUS at 08:12:00

## 2024-09-09 RX ADMIN — PHENYLEPHRINE HCL 100 MCG: 10 MG/ML VIAL (ML) INJECTION at 08:55:00

## 2024-09-09 RX ADMIN — ONDANSETRON 4 MG: 2 INJECTION INTRAMUSCULAR; INTRAVENOUS at 10:38:00

## 2024-09-09 RX ADMIN — LIDOCAINE HYDROCHLORIDE 50 MG: 10 INJECTION, SOLUTION EPIDURAL; INFILTRATION; INTRACAUDAL; PERINEURAL at 07:39:00

## 2024-09-09 RX ADMIN — PHENYLEPHRINE HCL 50 MCG: 10 MG/ML VIAL (ML) INJECTION at 08:44:00

## 2024-09-09 RX ADMIN — GLYCOPYRROLATE 0.2 MG: 0.2 INJECTION, SOLUTION INTRAMUSCULAR; INTRAVENOUS at 07:39:00

## 2024-09-09 RX ADMIN — KETAMINE HYDROCHLORIDE 50 MG: 50 INJECTION, SOLUTION INTRAMUSCULAR; INTRAVENOUS at 07:39:00

## 2024-09-09 NOTE — ANESTHESIA PROCEDURE NOTES
Airway  Date/Time: 9/9/2024 7:40 AM  Urgency: elective    Airway not difficult    General Information and Staff    Patient location during procedure: OR  Resident/CRNA: Sandra Majano CRNA  Performed: CRNA   Performed by: Sandra Majano CRNA  Authorized by: Teddy English MD      Indications and Patient Condition  Indications for airway management: airway protection and anesthesia  Preoxygenated: yes  Mask difficulty assessment: 0 - not attempted    Final Airway Details  Final airway type: endotracheal airway      Successful airway: ETT  Cuffed: yes   Successful intubation technique: Video laryngoscopy  Facilitating devices/methods: intubating stylet  Blade size: #4  ETT size (mm): 7.5    Cormack-Lehane Classification: grade I - full view of glottis  Placement verified by: capnometry   Number of attempts at approach: 1

## 2024-09-09 NOTE — INTERVAL H&P NOTE
Pre-op Diagnosis: DDD (degenerative disc disease), lumbosacral [M51.37]  Degenerative disc disease, lumbar [M51.36]  Other spondylosis with radiculopathy, lumbar region [M47.26]    The above referenced H&P was reviewed by Shawn Cardona MD on 9/9/2024, the patient was examined and no significant changes have occurred in the patient's condition since the H&P was performed.  I discussed with the patient and/or legal representative the potential benefits, risks and side effects of this procedure; the likelihood of the patient achieving goals; and potential problems that might occur during recuperation.  I discussed reasonable alternatives to the procedure, including risks, benefits and side effects related to the alternatives and risks related to not receiving this procedure.  We will proceed with procedure as planned.

## 2024-09-09 NOTE — OPERATIVE REPORT
SHAWN REYEZ M.D., F.A.A.N.S.     of Neurosurgery  [facility]  Board Certified Neurosurgeon                          [facility]  part of [address]  [address] [place] [address]    PHONE  [phone]          FAX  [phone]    [email or web address]      OPERATIVE REPORT    PATIENT NAME: Louis Raymundo II    DATE OF OPERATION:  9/9/2024    PREOPERATIVE DIAGNOSIS:  1. Lumbar spondylosis with radiculopathy             2. Lumbosacral spondylosis with radiculopathy    POSTOPERATIVE DIAGNOSIS: 1. same               2. same    OPERATIVE PROCEDURE:  1.  Lumbar 4 through 5 and lumbar 5 through sacral 1 transforaminal intervertebral arthrodesis and fusion, utilizing 2 titanium interbodies/cages and allograft and locally-harvested morselized autograft  2.  Lumbar 4 through 5 and lumbar 5 through sacral 1 hemilaminectomy and left complete facetectomy and left posterolateral arthrodesis, utilizing locally-harvested morselized autograft  3. Lumbar 3 through 4 laminectomy and left medial facetectomy and foraminotomy and discectomy  3.  Lumbar 4 through S1 pedicle screw and katharina instrumentation for augmentation of fusion purposes.  4.  Real time intraoperative fluoroscopy and C-arm with the image guidance.  5.  Real time intraoperative motor-evoked potentials, SSEP and nerve monitoring.    CPT CODES: 92347, 52136, 37161-64, 65585, 65842-26-22, 04418, 22853x2, 54426, 68758, 53322-61    SURGEON:  Shawn Reyez M.D.    ASSISTANT: Isaias Smith PA-C    ANESTHESIA: General endotracheal anesthesia with TIVA and local anesthetic.    ESTIMATED BLOOD LOSS: 25 cc    INTRAVENOUS FLUIDS AND URINE OUTPUT: Per anesthesia sheet    TRANSFUSION: None    IMPLANTS: Nuva    DRAIN: None    SPECIMEN: none    COMPLICATIONS: none    PROCEDURE:    After informed consent  was obtained, the patient was brought to the operating room.  After the uneventful induction of general endotracheal anesthesia and placement of the Ramsey catheter, electrodes and monitoring devices were placed. The patient was then positioned on the operating room table, an open-frame Conner Table, in the prone position. The arms were supported and the neck physiologically extended. All pressure points were adequately padded. The patient's eyes were protected from exposure to prolonged pressure. Baseline electrophysiological potentials were obtained. Subsequently, we utilized lateral and AP fluoroscopic guidance to identify our incision sites relative to the lumbar bony anatomy corresponding to the correct level(s). We identified the incision sites for our tubular retractor placement as well as the percutaneous screws, contralaterally. They were marked out on the skin.  The patient was prepped and draped sterilely.  The C-arm was also draped sterilely into the field. Time-Out was done in the proper fashion. Perioperative antibiosis was given within one hour of incision.     After the \"Time-Out\", we infiltrated the incisions with our usual local anesthetic. We incised utilizing a 10-blade scalpel. The subcutaneous tissues were opened with Bovie cautery down to the fascia.  The fascia was opened sharply using both sharp and blunt dissection. We then used sequential dilators to place a tubular retractor at the L4-5 level along the left laminofacet junction. This was done with fluoroscopic guidance. The tubular retractor was then connected to a table-mounted arm for added stability.     The soft tissues overlying the laminofacet junction were resected utilizing mono- and bipolar electrocautery. We then used a high-speed yesika the create an ipsilateral laminectomy, undercutting the lamina contralaterally in order to accomplish a generous central decompression. The medial ispilateral facet was then drilled in order to  decompress the lateral recess.  A high-speed yseika was then utilized to complete the generous laminectomy and the left L4 pars was then transected perpendicularly. This detached the L4 articulating facet en-bloc. We then drilled down the corresponding L5 articulating surface to expose the L4-5 intervertebral space and disc. The rest of the bony decompression was accomplished utilizing Kerrison rongeurs. After the soft tissues, including the ligamentum flavum, had been resected, we visualized the ipsilaterally exiting L4 and the en-passage L5 nerve roots verifying adequate decompression. The disc space was then re-identified and some epidural veins around the disc space were coagulated and cut sharply with the microscissors.  The disc space was incised and a radical discectomy was performed using a series of Aida and Kerrison rongeurs, extending across the midline with angled curettes.  The cartilaginous endplates were then removed using a series of curettes, rongeurs, rasps, and celia, removing the cartilaginous endplates and roughing up the bony endplates to get good bleeding bone along both surfaces for fusion purposes.  This was inspected with direct visualization and fluoroscopy repeatedly.  The wound was copiously irrigated. The nerves were maintained safe and protected with continued direct visualization. No additional disc or cartilaginous endplate was encountered and very good bleeding bony endplates were seen. We trialed for the properly-sized intervertebral cage and then filled it with allograft. About 9 cc of Osteocel Plus with some of the drilled autologous bone were impacted along the ventral aspect of the disc space and also to the right side of the disc space utilizing a funnel applicator and graft delivery system. While gently and carefully retracting the en-passage nerve root medially, the cage was impacted into position and 25 % expanded to proper alignment. Approximately, 5 degrees of  segmental lordosis were gained.  Fluoroscopy confirmed good positioning of the cage. We irrigated the surgical site copiously and hemostasis was obtained with bipolar electrocautery and Flowseal. We then decorticated the ipsilateral posterolateral elements generously extending from L4 to L5 with the high-speed yesika. The rest of our harvested autograft which had been denuded off all soft tissue and morselized was then packed into the posterolateral space and gutter extending from L4 through L5 for posterolateral arthrodesis purposes. The tubular retractor was then slowly removed, verifying hemostasis at every point of the way meticulously with bipolar electrocautery.    We then performed the same sequence of events between L5 and S1 with a decompression, complete left facetectomy, L5-S1 complete discectomy and intervertebral arthrodesis, utilizing an expandable cage with allograft and locally-harvested morselized autograft.    We then redialated at the L3-4 level coming in from the left and placed and secured the tubular retractor there. The soft tissues overlying the left L3-4 laminofacet junction was resected utilizing monopolar and bipolar electrocautery. We then used the yesika to resect the left lamina and the medial facet. The ligamentum flavum was resected and the lateral recess exposed. We visualized the en-passage L4 nerve root on the left and then performed a minimal microdiscetomy at this level, following the nerve to its foramen. We irrigated the surgical site copiously and hemostasis was obtained with bipolar electrocautery and Flowseal.  The tubular retractor was then slowly removed, verifying hemostasis at every point of the way meticulously with bipolar electrocautery.      Next, after the corresponding incisions were opened with a 10-blade, a monitored Jamshidi needle was used to cannulate the pedicles from L4 to L5 to S1 in the traditional transpedicular fashion. We verified electrophysiological  readings at good thresholds. The trajectories were fluoroscopically confirmed. Subsequently, the screws with their extenders were placed over guidewires utilizing fluoroscopic guidance. Finally, we tested and stimulated all screws electrophysiologically at appropriate thresholds.      At this point in the procedure we proceeded to finish the instrumentation. In cases where a segmental spondylolisthesis was present, we asked our anesthesia colleagues now to provide pharmacological paralysis so that the listhesis would be easier to reduce.  Utilizing calipers within the screw extenders, we measured for the properly-sized lordotic rods and placed them appropriately. These were placed through the guide channels of the extenders and easily placed into the screw heads.  Locking nuts were provisionally placed.  Then, using the torque  and the counter-torque device, each end of the katharina was secured into the tulip heads of the screws, compressing or reducing the construct lightly before tightening the rods.  The locking nuts were again revisited with the torque  to confirm tightness.  The screw extenders were then removed and final fluoroscopic images documented satisfactory position of the cage, screws, and rods.  The wound was copiously irrigated and small bleeding points were controlled with a bipolar electrocautery.     The construct was again inspected and found to be quite satisfactory under direct visualization.  Still, more irrigation was used and the lumbodorsal fascia was closed using a series of interrupted 0 Vicryl sutures.  The subcutaneous tissues were copiously irrigated and closed with an interrupted inverted 3-0 Vicryl suture. The skin was closed with 4-0 Vicryl and Dermabond.    There were no complications.  All counts were reported correct. The nerve stimulation of the screws achieved satisfactory thresholds including with the final placement of the instrumentation.  The patient was returned to  the supine position, extubated in the operating room, and taken to the recovery room in satisfactory condition, having tolerated the procedure well and moving all fours strongly to command.        Shawn Cardona M.D., F.A.A.N.S.    9/9/2024  10:44 AM

## 2024-09-09 NOTE — CONSULTS
[facility]    PATIENT'S NAME: SIMON BURNETT II   ATTENDING PHYSICIAN: Shawn Cardona MD   CONSULTING PHYSICIAN: Gibran Garcia MD   PATIENT ACCOUNT#:   [id number]    LOCATION:  Critical access hospital PACU 3 [facility] 10  MEDICAL RECORD #:   [id number]       YOB: 1975  ADMISSION DATE:       09/09/2024      CONSULT DATE:  09/09/2024    REPORT OF CONSULTATION      REASON FOR ADMISSION:  Post lumbar interbody fusion.    HISTORY OF PRESENT ILLNESS:  Patient is a 49-year-old  male with chronic back pain and radiculopathy.  MRI scan of the lumbar spine showed L3-L4 disc herniation and L4-L5 bilateral foraminal stenosis, failed outpatient conservative medical management options.  Scheduled today for above-mentioned procedure by his spine neurosurgeon, Dr. Shawn Cardona.  Postoperatively, transferred to PACU for further monitoring.     PAST MEDICAL HISTORY:  Degenerative joint disease of lumbar spine, anxiety, depression, migraines, and gastroesophageal reflux disease.    PAST SURGICAL HISTORY:  Ruptured appendicitis requiring appendectomy and partial colectomy.  He had laparoscopic cholecystectomy.     MEDICATIONS:  Please see medication reconciliation list.     ALLERGIES:  Environmental allergies.  No known drug allergies.     SOCIAL HISTORY:  No tobacco, alcohol, or drug use.  Lives with his family.  Independent in his basic activities of daily living.     FAMILY HISTORY:  Mother had breast cancer.     REVIEW OF SYSTEMS:  Currently resting in bed.  Lower back discomfort.  No lower extremity tingling or numbness.  No chest pain.  No shortness of breath.  Other 12-point review of systems is negative.        PHYSICAL EXAMINATION:    GENERAL:  Alert and oriented to time, place and person.  No acute distress.   VITAL SIGNS:  Temperature 98.6, pulse 84, respiratory rate 12, blood pressure 125/70, pulse ox 98% on 3L nasal cannula oxygen.  HEENT:  Atraumatic.  Oropharynx clear.  Moist mucous membranes.  Ears and  nose normal.  Eyes:  Anicteric sclerae.   NECK:  Supple.  No lymphadenopathy.  Trachea midline.  Full range of motion.   LUNGS:  Clear to auscultation bilaterally.  Normal respiratory effort.    HEART:  Regular rate and rhythm.  S1 and S2 auscultated.  No murmur.    ABDOMEN:  Soft, nondistended.  No tenderness.  Positive bowel sounds.   EXTREMITIES:  No peripheral edema, clubbing or cyanosis.   NEUROLOGIC:  Lumbar dressing noted.  Motor and sensory intact.      ASSESSMENT AND PLAN:  Lumbar radiculopathy, status post L3-L4, L4-L5, and L5-S1 laminectomy; left L4-L5 and L5-S1 complete facetectomy and transforaminal lumbar interbody fusion.  Pain control.  Neuro checks.  DVT prophylaxis.  Physical and occupational therapy.      Dictated By Gibran Garcia MD  d: 09/09/2024 13:04:53  t: 09/09/2024 13:13:26  Job [id number]  FB/

## 2024-09-09 NOTE — ANESTHESIA POSTPROCEDURE EVALUATION
Patient: Louis Raymundo II    Procedure Summary       Date: 09/09/24 Room / Location: [facility] MAIN OR  / [facility] MAIN OR    Anesthesia Start: 0735 Anesthesia Stop: 1139    Procedures:       L3-4, L4-5, L5-S1 laminectomy.  Left L4-5 and L5-S1 complete facetectomies and transforaminal lumbar interbody fusion (Left: Spine Lumbar)      POSTERIOR LUMBAR LAMINECT SPINAL FUS W/INSTR 2 LEV (Left: Spine Lumbar) Diagnosis:       DDD (degenerative disc disease), lumbosacral      Degenerative disc disease, lumbar      Other spondylosis with radiculopathy, lumbar region      (DDD (degenerative disc disease), lumbosacral [M51.37]Degenerative disc disease, lumbar [M51.36]Other spondylosis with radiculopathy, lumbar region [M47.26])    Surgeons: Shawn Cardona MD Anesthesiologist: Teddy English MD    Anesthesia Type: general ASA Status: 2            Anesthesia Type: general    Vitals Value Taken Time   /72 09/09/24 1139   Temp 98.6 09/09/24 1139   Pulse 104 09/09/24 1139   Resp 11 09/09/24 1139   SpO2 97 % 09/09/24 1139   Vitals shown include unfiled device data.    [facility] AN Post Evaluation:   Patient Evaluated in PACU  Patient Participation: complete - patient participated  Level of Consciousness: awake  Pain Management: adequate  Airway Patency:  Dental exam unchanged from preop  Yes    Nausea/Vomiting: none  Cardiovascular Status: acceptable  Respiratory Status: acceptable and nasal cannula  Postoperative Hydration acceptable      Sandra Majano CRNA  9/9/2024 11:39 AM

## 2024-09-09 NOTE — PLAN OF CARE
SHAWN REYEZ M.D., BASIM.N.S.     of Neurosurgery  [facility]  Board Certified Neurosurgeon                              [facility]        [place] [facility]      [address]  [address]  [place] [address]    PHONE  [phone]          FAX  [phone]    [email or web address]    [facility]  part of [facility]     THORACIC AND/OR LUMBAR FUSION DISCLAIMER AND CONSENT        9/9/2024  7:20 AM    PRE-OPERATIVE CONSULTATION     Louis Raymundo II was again informed of the nature of the problem, planned treatment, indications and alternatives.  We discussed the use of hardware, including but not limited to intervertebral spacers, cages, screws, rods, plates, the use of biologics, etc.. We again reviewed the expected benefits of surgery, as well as all reasonably foreseeable risks, including but not limited to infection, bleeding requiring transfusion or reoperation, no relief or worsening of the pain, numbness, weakness, need for assistive devices to get around, injury to the nerves, paralysis, loss of control of the legs/bladder/bowel/sexual function, spinal fluid leak, scar formation, failure of the fusion to heal (made more likely with smoking,) bad position of the screws or cages, migration of the screws or cages, breakage of the screws or rods, potential problems above or below the level of fusion due to increased stress on those levels from the fusion and leading to more pain and possibly the need for more surgery, heart attack, blood clot formation, pulmonary embolism, stroke, coma and death. his questions were all answered and he understands what we discussed.  he also understands that no guarantees can be made regarding outcome or results.  Louis Raymundo II agrees the benefits of surgery outweigh the risks, and wishes to proceed with surgery.      Shawn Reyez M.D., [email or web address].

## 2024-09-09 NOTE — ANESTHESIA PREPROCEDURE EVALUATION
Anesthesia PreOp Note    HPI:     Louis Raymundo II is a 49 year old male who presents for preoperative consultation requested by: Shawn Cardona MD    Date of Surgery: 9/9/2024    Procedure(s):  L3-4, L4-5, L5-S1 laminectomy.  Left L4-5 and L5-S1 complete facetectomies and transforaminal lumbar interbody fusion  POSTERIOR LUMBAR LAMINECT SPINAL FUS W/INSTR 2 LEV  Indication: DDD (degenerative disc disease), lumbosacral [M51.37]  Degenerative disc disease, lumbar [M51.36]  Other spondylosis with radiculopathy, lumbar region [M47.26]    Relevant Problems   No relevant active problems       NPO:  Last Liquid Consumption Date: 09/08/24  Last Liquid Consumption Time: 2030  Last Solid Consumption Date: 09/08/24  Last Solid Consumption Time: 2030  Last Liquid Consumption Date: 09/08/24          History Review:  Patient Active Problem List    Diagnosis Date Noted    Pain in soft tissues of limb 08/09/2022    Biomechanical lesion 03/01/2022    Lumbar disc herniation with radiculopathy 03/01/2022    Duodenitis 06/25/2021    Gastric polyps 06/25/2021    Chronic back pain 06/22/2021    Fatty liver 06/22/2021    Gastroesophageal reflux disease with esophagitis without hemorrhage 06/22/2021    Long term current use of opiate analgesic 06/22/2021    Status post cholecystectomy 06/22/2021    Testicular mass 06/22/2021    Anxiety 07/02/2020    Migraine 07/02/2020    Anxiety and depression 07/02/2020    Right ear pain 02/26/2020    Overweight with body mass index (BMI) 25.0-29.9 07/10/2019    Vitamin D deficiency 07/10/2019    Screening for prostate cancer 06/05/2019    Pain in joint, hand 03/31/2015    Bulge of lumbar disc without myelopathy 11/07/2013    Pain in thoracic spine 11/15/2012    Thoracic or lumbosacral neuritis or radiculitis 11/15/2012       Past Medical History:    Anxiety state    Back problem    degenerative disc disease    Depression    Esophageal reflux    History of blood transfusion    with emergency appy, no  reaction    Migraines    Osteoarthritis    Visual impairment    readers       Past Surgical History:   Procedure Laterality Date    Appendectomy  1992    Colectomy      Colonoscopy  09/03/2021    Laparoscopic cholecystectomy      Other      peritonitis r/t ruptured appendix -  week later 9 inches colon removed    Other surgical history Bilateral     Sesamoid feet       Medications Prior to Admission   Medication Sig Dispense Refill Last Dose    Rizatriptan Benzoate 10 MG Oral Tab Take 1 tablet (10 mg total) by mouth as needed for Migraine. 10 tablet 1 Past Month    sertraline 50 MG Oral Tab Take 1 tablet (50 mg total) by mouth every morning.   9/9/2024 at 0430    Tadalafil (CIALIS) 20 MG Oral Tab Take 1 tablet (20 mg total) by mouth as needed for Erectile Dysfunction. 24 tablet 1 8/23/2024    HYDROcodone-acetaminophen  MG Oral Tab Take 1 tablet by mouth every 8 (eight) hours as needed for Pain. 90 tablet 0 9/8/2024 at 1200    cyclobenzaprine 10 MG Oral Tab Take 1 tablet (10 mg total) by mouth 2 (two) times daily as needed for Muscle spasms. 60 tablet 0 9/8/2024 at 1800    pregabalin 200 MG Oral Cap Take 1 capsule (200 mg total) by mouth 2 (two) times daily. 60 capsule 0 9/8/2024 at 2100    dicyclomine 10 MG Oral Cap Take 1 capsule (10 mg total) by mouth 4 (four) times daily before meals and nightly. 120 capsule 1 9/8/2024 at 0900    pantoprazole 40 MG Oral Tab EC Take 1 tablet (40 mg total) by mouth every morning before breakfast. 30 tablet 2 9/9/2024 at 0430     Current Facility-Administered Medications Ordered in Epic   Medication Dose Route Frequency Provider Last Rate Last Admin    lactated ringers infusion   Intravenous Continuous Shawn Cardona MD 20 mL/hr at 09/09/24 0621 New Bag at 09/09/24 0621    ceFAZolin (Ancef) 2g in 10mL IV syringe premix  2 g Intravenous Once Shawn Cardona MD         No current [facility]-ordered outpatient medications on file.       Allergies   Allergen Reactions    Mold OTHER (SEE  COMMENTS)     Note: congestion    Other OTHER (SEE COMMENTS)     Note: congestion    Pollen OTHER (SEE COMMENTS)     Note: congestion    Seasonal OTHER (SEE COMMENTS)     Note: congestion    Watermelon OTHER (SEE COMMENTS)     Note: itchy and congestion    Theophylline ITCHING and OTHER (SEE COMMENTS)       Family History   Problem Relation Age of Onset    Other (Other) Father         brain trauma    Cancer Mother         breast     Social History     Socioeconomic History    Marital status:    Tobacco Use    Smoking status: Never    Smokeless tobacco: Never   Vaping Use    Vaping status: Never Used   Substance and Sexual Activity    Alcohol use: Never    Drug use: Not Currently   Other Topics Concern    Caffeine Concern Yes    Special Diet No       Available pre-op labs reviewed.        Lab Results   Component Value Date    INR 0.96 09/06/2024       Vital Signs:  Body mass index is 25.77 kg/m².   height is 1.829 m (6') and weight is 86.2 kg (190 lb). His oral temperature is 98.3 °F (36.8 °C). His blood pressure is 143/80 and his pulse is 86. His respiration is 16 and oxygen saturation is 99%.   Vitals:    08/30/24 0911 09/09/24 0618   BP:  143/80   Pulse:  86   Resp:  16   Temp:  98.3 °F (36.8 °C)   TempSrc:  Oral   SpO2:  99%   Weight: 85.3 kg (188 lb) 86.2 kg (190 lb)   Height: 1.829 m (6')         Anesthesia Evaluation     Patient summary reviewed and Nursing notes reviewed    No history of anesthetic complications   Airway   Mallampati: II  Neck ROM: full  Dental      Pulmonary - negative ROS and normal exam   Cardiovascular - negative ROS and normal exam    Neuro/Psych - negative ROS     GI/Hepatic/Renal - negative ROS   (+) GERD    Endo/Other - negative ROS   Abdominal  - normal exam                 Anesthesia Plan:   ASA:  2  Plan:   General  Airway:  ETT  Post-op Pain Management: IV analgesics  Informed Consent Plan and Risks Discussed With:  Patient  Discussed plan with:  CRNA      I have informed  Louis Raymundo II and/or legal guardian or family member of the nature of the anesthetic plan, benefits, risks including possible dental damage if relevant, major complications, and any alternative forms of anesthetic management.   All of the patient's questions were answered to the best of my ability. The patient desires the anesthetic management as planned.  ROSA ELENA STAFFORD MD  9/9/2024 7:17 AM  Present on Admission:  **None**

## 2024-09-09 NOTE — H&P
Patient seen and examined by PCP. Cleared for surgical intervention. Patient has no new questions or concerns today.        ATTENDING OUTPATIENT NOTE:  LOCAL TITLE: [facility] ATTENDING NOTE  STANDARD TITLE: ATTENDING OUTPATIENT NOTE  DATE OF NOTE: AUG 29, 2024@08:46 ENTRY DATE: AUG 29, 2024@08:46:23  AUTHOR: EAN CEDENO EXP COSIGNER:  URGENCY: STATUS: COMPLETED    PACT 3 Note      DEMOGRAPHICS: SIMON BURNETT    49 year old , WHITE MALE    CHIEF COMPLAINT: Scheduled for surgery-here for medical clearance.    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 Patient has attempted multiple conservative and less invasive  treatment modalities in the past without success.  He is currently scheduled for L3-L4/L4-L5 and L5-S1 laminectomy with complete  facetectomies at L4-L5 and L5-S1 and transforaminal lumbar interbody fusion.      ____________________________________________________________________________  ACTIVE PROBLEMS: Computerized Problem List is the source for the followin. Migraine 24 EAN CEDENO  2. Anxiety 24 EAN CEDENO  3. Gastroesophageal Reflux Disease (SCT [id number]) 24 EAN CEDENO  4. Chronic pain 24 EAN CEDENO  5. Irritable bowel syndrome 24 EAN CEDENO  6. Vitamin D deficiency 24 EAN CEDENO  7. Hyperlipidemia 24 EAN CEDENO  8. Lumbar radiculopathy 24 EAN CEDENO  9. Exposure to potentially hazardous substance 05/10/24 MOIZ VILLASENOR  10. Employment problem 24 WILL WU  IMMUNIZATIONS: INFLUENZA, SPLIT VIRUS, QUADRIVALENT, PF done on DEC 01, 2022  WARNING: Data may be incomplete    MRT5 - Allergies/ADRs    FACILITY ALLERGY/ADR  -------- -----------  No Remote Allergy/ADR Data available for this patient  CORA MORALES [facility] No Known Allergies    Essential Med List For Review (EMLR)    The patient's  medication list, including over the counter medications,  remote, pending, , and discontinued, was reviewed and reconciled  with the patient and/or caregiver. Any discrepancies have been noted and  corrected as stated elsewhere in this note. A reconciled medication list  was provided to the patient.    MRT1 - Med Reconciliation    INCLUDED IN THIS LIST: Alphabetical list of active outpatient  prescriptions [facility]ensed from this [facility] (local) and dispensed from another  VA or DoD facility (remote) as well as inpatient orders (local pending and  active), local clinic medications, locally documented non-VA medications,  and local prescriptions that have  or been discontinued in the past  90 days.    Non-VA Meds Last Documented On: May 09, 2024      NOTE The display of VA prescriptions dispensed from another VA or  DoD facility (remote) is limited to active outpatient prescription entries  matched to National Drug File at the originating site and may not include  some items such as investigational drugs, compounds, etc.    NOT INCLUDED IN THIS LIST: Medications self-entered by the patient into  personal health records (i.e. [employer]) are NOT included in this  list. Non-VA medications documented outside this VA, remote inpatient  orders (regardless of status) and remote clinic medications are NOT  included in this list. The patient and provider must always discuss  medications the patient is taking, regardless of where the medication was  dispensed or obtained.    ------------------------------------------------------------------------  Non-VA CYCLOBENZAPRINE HCL 10MG TAB  TAKE ONE TABLET BY MOUTH TWICE A DAY Medication  prescribed by Non-VA provider.    OUTPT CYCLOBENZAPRINE HCL 10MG TAB (Status = Active)  TAKE ONE TABLET BY MOUTH TWICE A DAY AS NEEDED FOR MUSCLE PAIN  Rx# [id number] Last Released: 24 Qty/Days Supply: 180/90  Rx Expiration Date: 25 Refills Remaining: 3  Indication: FOR MUSCLE  PAIN    Non-VA DICYCLOMINE HCL 10MG CAP  TAKE 1 CAPSULE BY MOUTH FOUR TIMES A DAY Medication  prescribed by Non-VA provider.    OUTPT DICYCLOMINE HCL 10MG CAP (Status = Active)  TAKE TWO CAPSULES BY MOUTH TWICE A DAY FOR ABDOMINAL PAIN  Rx# [id number] Last Released: 24 Qty/Days Supply: 360/90  Rx Expiration Date: 25 Refills Remaining: 3  Indication: FOR ABDOMINAL PAIN    Non-VA ESCITALOPRAM OXALATE 20MG TAB  TAKE ONE TABLET BY MOUTH EVERY DAY Medication  prescribed by Non-VA provider.    OUTPT ESCITALOPRAM OXALATE 20MG TAB (Status = Discontinued)  TAKE ONE-HALF TABLET BY MOUTH EVERY DAY FOR ANXIETY  Rx# [id number] Last Released: 24 Qty/Days Supply: 45  Rx Expiration Date: 25 Refills Remaining: 3  Indication: FOR ANXIETY    Non-VA HYDROCODONE 10MG/ACETAMINOPHEN 325MG TAB  325MG TABLET TAKE ONE TABLET BY MOUTH THREE TIMES A  DAY AS NEEDED Medication prescribed by Non-VA  provider.    OUTPT HYDROCODONE 10MG/ACETAMINOPHEN 325MG TAB (Status = )  TAKE 1 TABLET BY MOUTH THREE TIMES A DAY AS NEEDED FOR PAIN (DUE  24)  Rx# [id number] Last Released: 24 Qty/Days Supply:   Rx Expiration Date: 24 Refills Remainin  Indication: FOR PAIN    OUTPT HYDROCODONE 10MG/ACETAMINOPHEN 325MG TAB (Status = Active)  TAKE 1 TABLET BY MOUTH THREE TIMES A DAY AS NEEDED FOR PAIN  (2024)  Rx# [id number] Last Released: 24 Qty/Days Supply:   Rx Expiration Date: 24 Refills Remainin  Indication: FOR PAIN    Non-VA HYDROXYZINE HCL 50MG TAB  TAKE ONE TABLET BY MOUTH EVERY DAY Medication  prescribed by Non-VA provider.    OUTPT NALOXONE HCL 4MG/SPRAY SOLN NASAL SPRAY (Status = Active)  INSTILL 1 SPRAY IN ONLY ONE NOSTRIL ONCE AS NEEDED FOR OPIOID  OVERDOSE DO NOT PRIME NASAL SPRAYS. GIVE ADDITIONAL DOSE IN OTHER  NOSTRIL IF PATIENT DOES NOT RESPOND WITHIN 2-3 MINUTES OR  RESPONDS BUT STOPS BREATHING AGAIN. CALL 911. IF USED, NOTIFY  YOUR PROVIDER.  Rx# [id number] Last Released: 24  Qty/Days Supply:   Rx Expiration Date: 25 Refills Remainin  Indication: FOR OPIOID OVERDOSE    OUTPT OMEPRAZOLE 20MG EC CAP (Status = Active)  TAKE ONE CAPSULE BY MOUTH EVERY MORNING FOR GASTROESOPHAGEAL  REFLUX DISEASE  Rx# [id number] Last Released: 24 Qty/Days Supply:   Rx Expiration Date: 25 Refills Remaining: 3  Indication: FOR GASTROESOPHAGEAL REFLUX DISEASE    Non-VA PANTOPRAZOLE NA 40MG EC TAB  TAKE ONE TABLET BY MOUTH EVERY DAY Medication  prescribed by Non-VA provider.    Non-VA PREGABALIN 200MG ORAL CAP  TAKE 1 CAPSULE BY MOUTH TWICE A DAY Medication  prescribed by Non-VA provider.    OUTPT PREGABALIN 200MG ORAL CAP (Status = Active)  TAKE ONE CAPSULE BY MOUTH TWICE A DAY FOR NERVE PAIN  Rx# [id number] Last Released: 24 Qty/Days Supply:   Rx Expiration Date: 24 Refills Remainin  Indication: FOR NERVE PAIN    Non-VA RIZATRIPTAN BENZOATE 10MG TAB  TAKE ONE TABLET BY MOUTH EVERY DAY AS NEEDED  Medication prescribed by Non-VA provider.    OUTPT SERTRALINE HCL 50MG TAB (Status = Active)  TAKE ONE-HALF TABLET BY MOUTH EVERY DAY FOR 7 DAYS, THEN TAKE ONE  TABLET EVERY DAY FOR MENTAL HEALTH  Rx# [id number] Last Released: 24 Qty/Days Supply:   Rx Expiration Date: 25 Refills Remaining: 3  Indication: FOR MENTAL HEALTH    Non-VA TADALAFIL 20MG TAB  TAKE ONE TABLET BY MOUTH EVERY DAY Medication  prescribed by Non-VA provider.    ------------------------------------------------------------------------  SUPPLIES  ------------------------------------------------------------------------    OUTPT TABLET CUTTER (Status = Active)  ONE CUTTER (MEDICAL SUPPLY) EVERY DAY  Rx# [id number] Last Released: 24 Qty/Days Supply:   Rx Expiration Date: 24 Refills Remainin    ==========PHARMACY TERMS AND POSSIBLE PATIENT ACTIONS===========    INPT = VA inpatient order  IV = VA intravenous medication  OUTPT = VA outpatient prescription    PHARMACY POSSIBLE  PATIENT  TERMS EXPLANATION ACTIONS  -------- -------------------------------- ---------------------    ACTIVE A prescription that can be If you have refills,  filled at the local VA pharmacy. you may request a  refill of this  prescription from  your VA pharmacy.    CLINIC A medication you received during If you have questions  a visit to a VA clinic or about this medication  emergency department. contact your VA  healthcare team.    DISCONTINUED A prescription your provider has Contact your VA  stopped. It is no longer healthcare team if you  available to be sent to you or need more of this  picked up at the VA pharmacy medication.  window.     A prescription which is too old Contact your VA  to fill. This does not refer to healthcare team if you  the expiration date of the need more of this  medication in the container. medication.    NON-VA A medication that came from If this medication  someplace other than a VA information is  pharmacy. This may be a incorrect or out of  prescription from either the VA date, please tell your  or non VA providers that was VA healthcare team.  filled outside the VA. Or, it  may be an over-the-counter (OTC),  herbal, dietary supplements or  sample medication.    ON HOLD An active prescription that will Contact your VA  not be filled until pharmacy pharmacy when you need  resolves the issue. more of this medication.    PARKED An active prescription that will Contact your VA  not be filled until the patient pharmacy when you need  requests it. this medication.    PENDING This prescription order has been If you have been  sent to the pharmacy for review instructed to start  and is not ready yet. this medication now,  contact your VA  pharmacy.    SUSPENDED An active prescription that is Contact your VA  not scheduled to be filled yet. pharmacy if you need  You should receive it before this medication now.  you run  out.    ==================================================================  ____________________________________________________________________________  RECENT LAB RESULTS:  LAB RESULTS LAST 672 HRS - NONE FOUND    ____________________________________________________________________________    ROS:  Gen:Denies fatigue, No fevers, No abnormal weight changes  HEENT: no changes in vision and hearing  Resp: Denies cough, Denies Shortness of Breath  Cardiac: Denies Chest Pain, Denies palpitations,  Neuro: Denies headaches, Denies  syncope.  Persistent and worsening left lower extremity pain radiating into left hip left  thigh and down to the left heel.  Psych: Denies depression, denies anxiety, denies SI.  Gastro: Denies Nausea, Denies Vomiting, Denies diarrhea, denies Constipation,  Denies abdominal pain  : denies dysuria and frequency    ___________________________________________________________________________  Vitals:    Date Vital Measurement Qualifiers  08/29/2024 08:48 Temp F (C) 98.3 (36.8)  \" \" Pulse 87  \" \" Respir 12  \" \" /88  \" \" Wt lbs (kg)[BMI] 188 (85.28)[25] Actual  \" \" Pain 8  \" \" POx (L/Min)(%) 99    PHYSICAL EXAM:  GENERAL: No acute distress  CHEST: Clear breath sounds bilaterally. No wheezes, rales, or rhonchi.  CARDIAC: Regular rate and rhythm.  VASCULAR: No Edema. Peripheral pulses normal and equal in all extremities.  ABDOMEN: Normal and soft with no tenderness,  MUSCULOSKELETAL: Walks with a stiff gait. No motor weakness. No bladder or  bowel issues.  NEUROLOGICAL: Alert and oriented x 3. No focal sensory or strength deficits.  Speech normal. Follows commands appropriately.  PSYCHIATRIC: Normal affect, judgement and mood.  SKIN: Normal appearance with no rashes or lesions.    ___________________________________________________________________________    Assessment and Plan:    Lumbar radiculopathy  Awaiting surgery  Impression:mri ls spine      1. Degenerative changes as described.    2.  Central disc herniation at L3-L4 and broad-based disc  protrusion at L4-L5.    3. Moderate to severe central spinal stenosis and bilateral  foraminal stenosis at L3-L4 and mild central spinal stenosis and  bilateral foraminal stenosis at L4-L5.  Currently on Norco for pain  We will get EKG and labs today.  Patient is a healthy adult and is medically stable to undergo lumbar spinal  surgery as detailed above.  wants a refill on pain meds now as it is due in 1 week.surgery scheduled in 2  weeks.    PTSD/anxiety/depression  Has talked to Dr. Leung here.  Feels better with no suicidal ideation.  On escitalopram          Hyperlipidemia  diet  -Exercise    HDL 30    Vitamin D deficiency  Over-the-counter replacement  Level is 20        Migraine  No acute issues    History of cholecystectomy          CLINICAL REMINDER PROCESSING  Hepatitis B Serology/Immunization:  The patient has been vaccinated in the past but written documentation of  vaccination is not available today.    Sexual Orientation:  The patient thinks of their sexual orientation as:  Straight or Heterosexual    /zandra/ EAN CEDENO  Medical Director, [facility]  Signed: 08/29/2024 09:05

## 2024-09-10 PROCEDURE — 97116 GAIT TRAINING THERAPY: CPT

## 2024-09-10 PROCEDURE — 97162 PT EVAL MOD COMPLEX 30 MIN: CPT

## 2024-09-10 PROCEDURE — 97165 OT EVAL LOW COMPLEX 30 MIN: CPT

## 2024-09-10 PROCEDURE — 97530 THERAPEUTIC ACTIVITIES: CPT

## 2024-09-10 RX ORDER — METHOCARBAMOL 500 MG/1
500 TABLET, FILM COATED ORAL 3 TIMES DAILY
Status: DISCONTINUED | OUTPATIENT
Start: 2024-09-10 | End: 2024-09-12

## 2024-09-10 RX ORDER — OXYCODONE AND ACETAMINOPHEN 5; 325 MG/1; MG/1
1 TABLET ORAL EVERY 4 HOURS PRN
Status: DISCONTINUED | OUTPATIENT
Start: 2024-09-10 | End: 2024-09-13

## 2024-09-10 RX ORDER — OXYCODONE AND ACETAMINOPHEN 5; 325 MG/1; MG/1
2 TABLET ORAL EVERY 4 HOURS PRN
Status: DISCONTINUED | OUTPATIENT
Start: 2024-09-10 | End: 2024-09-13

## 2024-09-10 NOTE — CM/SW NOTE
Per chart, PT worked w/ pt and Anticipated therapy need: Home with Outpatient Rehab    MD to enter order/Rx for Outpatient PT prior to pt's discharge from [facility].      PLAN: Home w/ Outpatient PT - pending Rx & med clear      SW/CM to remain available for support and/or discharge planning.         MS CleoW, LSW [id number]

## 2024-09-10 NOTE — PROGRESS NOTES
[facility]  part of [facility]    Progress Note    Louis Raymundo II Patient Status:  Inpatient    1975 MRN [id number]   Location [facility] 4W/SW/SE Attending Alma Lucero MD   Hosp Day # 1 PCP Carlee Ray     Chief Complaint: Back pain     Subjective:   Louis Raymundo II is still having uncontrolled pain. He feels his L side is having spasms radiating down his L leg. No F/C no N/V. No CP or SOB       Objective:   Objective:    Blood pressure 124/69, pulse 86, temperature 99.6 °F (37.6 °C), temperature source Oral, resp. rate 18, height 6' (1.829 m), weight 190 lb (86.2 kg), SpO2 95%.    Physical Exam:    General: No acute distress.   Respiratory: Clear to auscultation bilaterally. No wheezes. No rhonchi.  Cardiovascular: S1, S2. Regular rate and rhythm. No murmurs, rubs or gallops.   Abdomen: Soft, nontender, nondistended.  Positive bowel sounds. No rebound or guarding.  Neurologic: No focal neurological deficits.   Musculoskeletal: Moves all extremities.  Extremities: No edema.      Results:   Results:    Labs:  Recent Labs   Lab 24  1011   INR 0.96       No results for input(s): \"GLU\", \"BUN\", \"CREATSERUM\", \"GFRAA\", \"GFRNAA\", \"CA\", \"ALB\", \"NA\", \"K\", \"CL\", \"CO2\", \"ALKPHO\", \"AST\", \"ALT\", \"BILT\", \"TP\" in the last 168 hours.    CrCl cannot be calculated (Patient's most recent lab result is older than the maximum 7 days allowed.).    Recent Labs   Lab 24  1011   PTP 13.4   INR 0.96            Culture:  No results found for this visit on 24.    Cardiac  No results for input(s): \"TROP\", \"PBNP\" in the last 168 hours.      Imaging: Imaging data reviewed in Epic.  XR FLUOROSCOPY C-ARM TIME LESS THAN 1 HOUR (CPT=76000)    Result Date: 2024  CONCLUSION: Intraoperative fluoroscopy provided.  Please see surgical note for specific details.     Dictated by (CST): Robert Schilling MD on 2024 at 12:26 PM     Finalized by (CST): Robert Schilling MD on 2024  at 12:26 PM           Medications:    sennosides  17.2 mg Oral Nightly    docusate sodium  100 mg Oral BID    pantoprazole  40 mg Oral QAM AC    pregabalin  200 mg Oral BID    sertraline  50 mg Oral QAM         Assessment and Plan:   Assessment & Plan:        Lumbar radiculopathy   Neurosurgery on consult.   S/p L3-S1 decompression, L4-S1 TLIF.   Pain control - switch to percocet PRN. Cont IV dilauddi for severe pain. Cont lyrica. Add robaxin 500mg TID.   PT/OT   SW for discharge planning.   Other medical problems  GERD  Anxiety/depression       >55min spent, >50% spent counseling and coordinating care in the form of educating pt/family and d/w consultants and staff. Most of the time spent discussing the above plan.        [facility] Cerda.     Plan of care discussed with patient or family at bedside.    Alma Lucero MD  Hospitalist          Supplementary Documentation:     Quality:  DVT Prophylaxis: SD  CODE status: Full   Dispo: per clinical course            Estimated date of discharge: TBD  Discharge is dependent on: clinical stability  At this point Mr. Raymundo is expected to be discharge to: home

## 2024-09-10 NOTE — PHYSICAL THERAPY NOTE
PHYSICAL THERAPY EVALUATION - INPATIENT     Room Number: 423/423-A  Evaluation Date: 9/10/2024  Type of Evaluation: Initial   Physician Order: PT Eval and Treat    Presenting Problem: L3-S1 laminectomy L L4-S1 TLIF     Reason for Therapy: Mobility Dysfunction and Discharge Planning    PHYSICAL THERAPY ASSESSMENT   Patient is a 49 year old male admitted 9/9/2024 for  L3-S1 laminectomy L L4-S1 TLIF.  Prior to admission, patient's baseline is independent in ADL's and ambulation with no assistive device.  Patient is currently functioning below baseline with bed mobility, transfers, gait, and stair negotiation.  Patient is requiring contact guard assist as a result of the following impairments: pain and medical status.  Physical Therapy will continue to follow for duration of hospitalization.    Patient will benefit from continued skilled PT Services at discharge to promote prior level of function and safety with additional support and return home with OP PT.    PLAN  PT Treatment Plan: Bed mobility;Body mechanics;Patient education;Gait training;Stair training;Transfer training;Balance training  Rehab Potential : Good  Frequency (Obs): Daily    PHYSICAL THERAPY MEDICAL/SOCIAL HISTORY   Problem List  Principal Problem:    Lumbar disc herniation with radiculopathy  Active Problems:    S/P lumbar fusion    HOME SITUATION  Home Situation  Type of Home: House  Home Layout: Two level  Stairs to Enter : 5  Railing: Yes  Stairs to Bedroom: 12  Railing: Yes  Lives With: Daughter  Drives: Yes  Patient Owned Equipment:  (will need rolling walker)  Patient Regularly Uses: None     SUBJECTIVE  \"I was up and walking in the hallway last night\"    PHYSICAL THERAPY EXAMINATION   OBJECTIVE  Precautions: Spine  Fall Risk: Standard fall risk    WEIGHT BEARING RESTRICTION  Weight Bearing Restriction: None                PAIN ASSESSMENT  Rating: 10  Location: low back  Management Techniques: Activity promotion;Body  mechanics;Repositioning    COGNITION  Overall Cognitive Status:  WFL - within functional limits    RANGE OF MOTION AND STRENGTH ASSESSMENT  Lower extremity ROM is within functional limits  BLE WNL  Lower extremity strength is within functional limits  BLE WNL    BALANCE  Static Sitting: Good  Dynamic Sitting: Fair +  Static Standing: Fair  Dynamic Standing: Fair -    AM-PAC '6-Clicks' INPATIENT SHORT FORM - BASIC MOBILITY  How much difficulty does the patient currently have...  Patient Difficulty: Turning over in bed (including adjusting bedclothes, sheets and blankets)?: A Little   Patient Difficulty: Sitting down on and standing up from a chair with arms (e.g., wheelchair, bedside commode, etc.): A Little   Patient Difficulty: Moving from lying on back to sitting on the side of the bed?: A Little   How much help from another person does the patient currently need...   Help from Another: Moving to and from a bed to a chair (including a wheelchair)?: A Little   Help from Another: Need to walk in hospital room?: A Little   Help from Another: Climbing 3-5 steps with a railing?: A Little     AM-PAC Score:  Raw Score: 18   Approx Degree of Impairment: 46.58%   Standardized Score (AM-PAC Scale): 43.63   CMS Modifier (G-Code): CK    FUNCTIONAL ABILITY STATUS  Functional Mobility/Gait Assessment  Gait Assistance: Contact guard assist  Distance (ft): 15ft  Assistive Device: Rolling walker  Pattern: Shuffle  Rolling:  not tested   Supine to Sit: supervision  Sit to Supine:  not tested   Sit to Stand: contact guard assist    Exercise/Education Provided:  Bed mobility  Body mechanics  Gait training  Transfer training    Skilled Therapy Provided: Patient on room air. Patient currently with SBA to CGA for mobility during the session with rolling walker. Patient with increased pain, was able to ambulate in hallway last night but is having more pain at this time. Patient able to ambulate about 15ft with rolling walker with CGA,  chair brought to patient since patient having pain. Shuffling gait noted. Will need rolling walker for home, likely outpatient physical therapy when cleared by MD. The patient's Approx Degree of Impairment: 46.58% has been calculated based on documentation in the [facility] '6 clicks' Inpatient Basic Mobility Short Form.  Research supports that patients with this level of impairment may benefit from home with home health. Patient received semi-fowlers in bed, agreeable to physical therapy evaluation. Education with patient provided verbally on physical therapy plan of care, physiological benefits of out of bed mobility, and spinal precautions. Next session anticipate to progress bed mobility, transfers, gait, and stair negotiation.    Patient history and/or personal factors that may impact the plan of care include home accessibility concerns. Based on the physical therapy examination of the noted systems and functional activity/participation limitations, the patient presentation is evolving given the patient presents with symptoms that are unchanging/predictable and presents with surgical precautions/limitations. Final disposition will be made by interdisciplinary medical team.    Patient End of Session: Up in chair;Needs met;Call light within reach;RN aware of session/findings;All patient questions and concerns addressed    CURRENT GOALS  Goals to be met by: 9/25/24  Patient Goal Patient's self-stated goal is: to go home   Goal #1 Patient is able to demonstrate supine - sit EOB @ level: independent     Goal #1   Current Status    Goal #2 Patient is able to demonstrate transfers Sit to/from Stand at assistance level: modified independent with walker - rolling     Goal #2  Current Status    Goal #3 Patient is able to ambulate 100 feet with assist device: walker - rolling at assistance level: modified independent   Goal #3   Current Status    Goal #4 Patient will negotiate 12 stairs/one curb w/ assistive device and  supervision   Goal #4   Current Status    Goal #5 Patient to demonstrate independence with home activity/exercise instructions provided to patient in preparation for discharge.   Goal #5   Current Status    Goal #6    Goal #6  Current Status      Patient Evaluation Complexity Level:  History Moderate - 1 or 2 personal factors and/or co-morbidities   Examination of body systems Moderate - addressing a total of 3 or more elements   Clinical Presentation  Moderate - Evolving   Clinical Decision Making  Moderate Complexity     Gait Training: 10 minutes  Therapeutic Activity:  13 minutes

## 2024-09-10 NOTE — PROGRESS NOTES
KAREN REYEZ M.D., F.A.A.N.S.     of Neurosurgery  [facility]  Board Certified Neurosurgeon                          [facility]  part of [facility] for [facility]      [address]  [address]  [place] [address]    PHONE  [phone]          FAX  [phone]    [email or web address]      NEUROSURGERY PROGRESS NOTE      Louis Raymundo II Patient Status:  Inpatient    1975 MRN [id number]   Location [facility] 4W/SW/SE Attending Karen Reyez MD   Hosp Day # 1 PCP Carlee Ray     Subjective:  Louis Raymundo II is a(n) 49 year old male.  Alert, orientated x3.  Cooperative. Post-op pain      Vital Signs:    Temp:  [96.9 °F (36.1 °C)-100 °F (37.8 °C)] 100 °F (37.8 °C)  Pulse:  [] 103  Resp:  [10-22] 18  BP: (110-155)/(53-91) 122/72  SpO2:  [93 %-100 %] 95 %    I/O:  I/O last 3 completed shifts:  In: 1765 [P.O.:15; I.V.:1750]  Out: 2805 [Urine:2775; Blood:30]    Inpatient Medications:    Current Facility-Administered Medications:     lactated ringers infusion, , Intravenous, Continuous    methocarbamol (Robaxin) tab 750 mg, 750 mg, Oral, TID PRN    sennosides (Senokot) tab 17.2 mg, 17.2 mg, Oral, Nightly    docusate sodium (Colace) cap 100 mg, 100 mg, Oral, BID    polyethylene glycol (PEG 3350) (Miralax) 17 g oral packet 17 g, 17 g, Oral, Daily PRN    magnesium hydroxide (Milk of Magnesia) 400 MG/5ML oral suspension 30 mL, 30 mL, Oral, Daily PRN    bisacodyl (Dulcolax) 10 MG rectal suppository 10 mg, 10 mg, Rectal, Daily PRN    fleet enema (Fleet) rectal enema 133 mL, 1 enema, Rectal, Once PRN    ondansetron (Zofran) 4 MG/2ML injection 4 mg, 4 mg, Intravenous, Q6H PRN    prochlorperazine (Compazine) 10 MG/2ML injection 5 mg, 5 mg, Intravenous, Q8H PRN    diphenhydrAMINE (Benadryl) cap/tab 25 mg, 25 mg, Oral, Q4H PRN **OR** diphenhydrAMINE (Benadryl) 50  mg/mL  injection 25 mg, 25 mg, Intravenous, Q4H PRN    benzocaine-menthol (Cepacol) lozenge 1 lozenge, 1 lozenge, Buccal, Q15 Min PRN    HYDROmorphone (Dilaudid) 1 MG/ML injection 0.4 mg, 0.4 mg, Intravenous, Q2H PRN **OR** HYDROmorphone (Dilaudid) 1 MG/ML injection 0.8 mg, 0.8 mg, Intravenous, Q2H PRN    oxyCODONE immediate release tab 5 mg, 5 mg, Oral, Q4H PRN **OR** oxyCODONE immediate release tab 10 mg, 10 mg, Oral, Q4H PRN    dicyclomine (Bentyl) cap 10 mg, 10 mg, Oral, Q4H PRN    pantoprazole (Protonix) DR tab 40 mg, 40 mg, Oral, QAM AC    pregabalin (Lyrica) cap 200 mg, 200 mg, Oral, BID    sertraline (Zoloft) tab 50 mg, 50 mg, Oral, QAM    melatonin cap/tab 5 mg, 5 mg, Oral, Nightly PRN    Labs:  Lab Results   Component Value Date    WBC 6.8 04/23/2024    HGB 17.7 (H) 04/23/2024    .0 04/23/2024    BUN 9 04/23/2024     04/23/2024    K 4.8 04/23/2024    CO2 28.0 04/23/2024    GLU 98 04/23/2024    ALB 5.0 (H) 04/23/2024    PTT 29.3 09/06/2024    INR 0.96 09/06/2024         Neurological Exam:  AAOx3, following commands  PERRLA  EOMI  MAEx4  Sensation symmetrical  Incisions c/d/I    Abdomen:  Soft, non-distended, non-tender, with no rebound or guarding.  No peritoneal signs.   Extremities:  Non-tender, no lower extremity edema noted.        Imaging:  XR FLUOROSCOPY C-ARM TIME LESS THAN 1 HOUR (CPT=76000)    Result Date: 9/9/2024  CONCLUSION: Intraoperative fluoroscopy provided.  Please see surgical note for specific details.     Dictated by (CST): Robert Schilling MD on 9/09/2024 at 12:26 PM     Finalized by (CST): Robert Schilling MD on 9/09/2024 at 12:26 PM           Assessment/Plan:  Principal Problem:    Lumbar disc herniation with radiculopathy  Active Problems:    S/P lumbar fusion    POD#1 s/p L3-S1 decompression, L4-S1 TLIF, doing well with post-op pain.   Mobilize with PTOT.  C/e pain control  Dispo pending.      All questions and concerns were addressed. We appreciate the opportunity to  participate in the care of this patient. Please do not hesitate to call our office ([phone]) with any issues.          Shawn Cardona M.D., F.A.A.N.S.    9/10/2024  8:09 AM    This note has been dictated utilizing voice recognition software. Unfortunately, this may lead to occasional typographical errors. If there are any questions regarding this, please do not hesitate to contact our office.

## 2024-09-10 NOTE — PLAN OF CARE
POD 1. AOX4 on RA. 1x walker. SCDs. General diet. Voiding via jara. Oxy given for pain. Plan for Pt/OT.  Gel ice.       Problem: Patient Centered Care  Goal: Patient preferences are identified and integrated in the patient's plan of care  Description: Interventions:  - What would you like us to know as we care for you?   - Provide timely, complete, and accurate information to patient/family  - Incorporate patient and family knowledge, values, beliefs, and cultural backgrounds into the planning and delivery of care  - Encourage patient/family to participate in care and decision-making at the level they choose  - Honor patient and family perspectives and choices  9/10/2024 0426 by Agustin Donaldson, RN  Outcome: Progressing  9/10/2024 0425 by Agustin Donaldson RN  Outcome: Progressing     Problem: PAIN - ADULT  Goal: Verbalizes/displays adequate comfort level or patient's stated pain goal  Description: INTERVENTIONS:  - Encourage pt to monitor pain and request assistance  - Assess pain using appropriate pain scale  - Administer analgesics based on type and severity of pain and evaluate response  - Implement non-pharmacological measures as appropriate and evaluate response  - Consider cultural and social influences on pain and pain management  - Manage/alleviate anxiety  - Utilize distraction and/or relaxation techniques  - Monitor for opioid side effects  - Notify MD/LIP if interventions unsuccessful or patient reports new pain  - Anticipate increased pain with activity and pre-medicate as appropriate  Outcome: Progressing     Problem: SAFETY ADULT - FALL  Goal: Free from fall injury  Description: INTERVENTIONS:  - Assess pt frequently for physical needs  - Identify cognitive and physical deficits and behaviors that affect risk of falls.  - [place] fall precautions as indicated by assessment.  - Educate pt/family on patient safety including physical limitations  - Instruct pt to call for assistance with activity  based on assessment  - Modify environment to reduce risk of injury  - Provide assistive devices as appropriate  - Consider OT/PT consult to assist with strengthening/mobility  - Encourage toileting schedule  Outcome: Progressing     Problem: DISCHARGE PLANNING  Goal: Discharge to home or other facility with appropriate resources  Description: INTERVENTIONS:  - Identify barriers to discharge w/pt and caregiver  - Include patient/family/discharge partner in discharge planning  - Arrange for needed discharge resources and transportation as appropriate  - Identify discharge learning needs (meds, wound care, etc)  - Arrange for interpreters to assist at discharge as needed  - Consider post-discharge preferences of patient/family/discharge partner  - Complete POLST form as appropriate  - Assess patient's ability to be responsible for managing their own health  - Refer to Case Management Department for coordinating discharge planning if the patient needs post-hospital services based on physician/LIP order or complex needs related to functional status, cognitive ability or social support system  Outcome: Progressing     Problem: GASTROINTESTINAL - ADULT  Goal: Minimal or absence of nausea and vomiting  Description: INTERVENTIONS:  - Maintain adequate hydration with IV or PO as ordered and tolerated  - Nasogastric tube to low intermittent suction as ordered  - Evaluate effectiveness of ordered antiemetic medications  - Provide nonpharmacologic comfort measures as appropriate  - Advance diet as tolerated, if ordered  - Obtain nutritional consult as needed  - Evaluate fluid balance  Outcome: Progressing     Problem: METABOLIC/FLUID AND ELECTROLYTES - ADULT  Goal: Electrolytes maintained within normal limits  Description: INTERVENTIONS:  - Monitor labs and rhythm and assess patient for signs and symptoms of electrolyte imbalances  - Administer electrolyte replacement as ordered  - Monitor response to electrolyte replacements,  including rhythm and repeat lab results as appropriate  - Fluid restriction as ordered  - Instruct patient on fluid and nutrition restrictions as appropriate  Outcome: Progressing     Problem: SKIN/TISSUE INTEGRITY - ADULT  Goal: Skin integrity remains intact  Description: INTERVENTIONS  - Assess and document risk factors for pressure ulcer development  - Assess and document skin integrity  - Monitor for areas of redness and/or skin breakdown  - Initiate interventions, skin care algorithm/standards of care as needed  Outcome: Progressing  Goal: Incision(s), wounds(s) or drain site(s) healing without S/S of infection  Description: INTERVENTIONS:  - Assess and document risk factors for pressure ulcer development  - Assess and document skin integrity  - Assess and document dressing/incision, wound bed, drain sites and surrounding tissue  - Implement wound care per orders  - Initiate isolation precautions as appropriate  - Initiate Pressure Ulcer prevention bundle as indicated  Outcome: Progressing

## 2024-09-10 NOTE — PLAN OF CARE
Pt. A&O x 4, room air, ambulating with walker/standby assist, voiding in urinal, constant surgical pain in the lower back, pt also reports pain radiating to left leg, managed by Dilaudid and Oxy PRN, occasional paresthesia in lower extremities, eating and drinking, reg diet, understands the need to wean off of IV pain meds to go home, plan to continue PT and manage pain until adequate for discharge.    Problem: Patient Centered Care  Goal: Patient preferences are identified and integrated in the patient's plan of care  Description: Interventions:  - What would you like us to know as we care for you?   - Provide timely, complete, and accurate information to patient/family  - Incorporate patient and family knowledge, values, beliefs, and cultural backgrounds into the planning and delivery of care  - Encourage patient/family to participate in care and decision-making at the level they choose  - Honor patient and family perspectives and choices  9/10/2024 1447 by Nissa Wu RN  Outcome: Progressing  9/10/2024 1447 by Nissa Wu RN  Outcome: Progressing     Problem: Patient/Family Goals  Goal: Patient/Family Long Term Goal  Description: Patient's Long Term Goal:     Interventions:  - See additional Care Plan goals for specific interventions  9/10/2024 1447 by Nissa Wu RN  Outcome: Progressing  9/10/2024 1447 by Nissa Wu RN  Outcome: Progressing  Goal: Patient/Family Short Term Goal  Description: Patient's Short Term Goal:     Interventions:   - See additional Care Plan goals for specific interventions  9/10/2024 1447 by Nissa Wu RN  Outcome: Progressing  9/10/2024 1447 by Nissa Wu RN  Outcome: Progressing     Problem: PAIN - ADULT  Goal: Verbalizes/displays adequate comfort level or patient's stated pain goal  Description: INTERVENTIONS:  - Encourage pt to monitor pain and request assistance  - Assess pain using appropriate pain scale  - Administer analgesics based on  type and severity of pain and evaluate response  - Implement non-pharmacological measures as appropriate and evaluate response  - Consider cultural and social influences on pain and pain management  - Manage/alleviate anxiety  - Utilize distraction and/or relaxation techniques  - Monitor for opioid side effects  - Notify MD/LIP if interventions unsuccessful or patient reports new pain  - Anticipate increased pain with activity and pre-medicate as appropriate  9/10/2024 1447 by Nissa Wu RN  Outcome: Progressing  9/10/2024 1447 by Nissa Wu RN  Outcome: Progressing     Problem: SAFETY ADULT - FALL  Goal: Free from fall injury  Description: INTERVENTIONS:  - Assess pt frequently for physical needs  - Identify cognitive and physical deficits and behaviors that affect risk of falls.  - [place] fall precautions as indicated by assessment.  - Educate pt/family on patient safety including physical limitations  - Instruct pt to call for assistance with activity based on assessment  - Modify environment to reduce risk of injury  - Provide assistive devices as appropriate  - Consider OT/PT consult to assist with strengthening/mobility  - Encourage toileting schedule  9/10/2024 1447 by Nissa Wu RN  Outcome: Progressing  9/10/2024 1447 by Nissa Wu, RN  Outcome: Progressing     Problem: DISCHARGE PLANNING  Goal: Discharge to home or other facility with appropriate resources  Description: INTERVENTIONS:  - Identify barriers to discharge w/pt and caregiver  - Include patient/family/discharge partner in discharge planning  - Arrange for needed discharge resources and transportation as appropriate  - Identify discharge learning needs (meds, wound care, etc)  - Arrange for interpreters to assist at discharge as needed  - Consider post-discharge preferences of patient/family/discharge partner  - Complete POLST form as appropriate  - Assess patient's ability to be responsible for managing their own  health  - Refer to Case Management Department for coordinating discharge planning if the patient needs post-hospital services based on physician/LIP order or complex needs related to functional status, cognitive ability or social support system  9/10/2024 1447 by Nissa Wu RN  Outcome: Progressing  9/10/2024 1447 by Nissa Wu RN  Outcome: Progressing     Problem: GASTROINTESTINAL - ADULT  Goal: Minimal or absence of nausea and vomiting  Description: INTERVENTIONS:  - Maintain adequate hydration with IV or PO as ordered and tolerated  - Nasogastric tube to low intermittent suction as ordered  - Evaluate effectiveness of ordered antiemetic medications  - Provide nonpharmacologic comfort measures as appropriate  - Advance diet as tolerated, if ordered  - Obtain nutritional consult as needed  - Evaluate fluid balance  9/10/2024 1447 by Nissa Wu RN  Outcome: Progressing  9/10/2024 1447 by Nissa Wu RN  Outcome: Progressing     Problem: METABOLIC/FLUID AND ELECTROLYTES - ADULT  Goal: Electrolytes maintained within normal limits  Description: INTERVENTIONS:  - Monitor labs and rhythm and assess patient for signs and symptoms of electrolyte imbalances  - Administer electrolyte replacement as ordered  - Monitor response to electrolyte replacements, including rhythm and repeat lab results as appropriate  - Fluid restriction as ordered  - Instruct patient on fluid and nutrition restrictions as appropriate  9/10/2024 1447 by Nissa Wu RN  Outcome: Progressing  9/10/2024 1447 by Nissa Wu RN  Outcome: Progressing     Problem: SKIN/TISSUE INTEGRITY - ADULT  Goal: Skin integrity remains intact  Description: INTERVENTIONS  - Assess and document risk factors for pressure ulcer development  - Assess and document skin integrity  - Monitor for areas of redness and/or skin breakdown  - Initiate interventions, skin care algorithm/standards of care as needed  9/10/2024 1447 by Bryce  ANALILIA Azar  Outcome: Progressing  9/10/2024 1447 by Nissa Wu RN  Outcome: Progressing  Goal: Incision(s), wounds(s) or drain site(s) healing without S/S of infection  Description: INTERVENTIONS:  - Assess and document risk factors for pressure ulcer development  - Assess and document skin integrity  - Assess and document dressing/incision, wound bed, drain sites and surrounding tissue  - Implement wound care per orders  - Initiate isolation precautions as appropriate  - Initiate Pressure Ulcer prevention bundle as indicated  9/10/2024 1447 by Nissa Wu RN  Outcome: Progressing  9/10/2024 1447 by Nissa Wu, RN  Outcome: Progressing

## 2024-09-10 NOTE — OCCUPATIONAL THERAPY NOTE
OCCUPATIONAL THERAPY EVALUATION - INPATIENT     Room Number: 423/423-A  Evaluation Date: 9/10/2024  Type of Evaluation: Initial  Presenting Problem: L3-L4; L4-L5 TLIF    Physician Order: IP Consult to Occupational Therapy  Reason for Therapy: ADL/IADL Dysfunction and Discharge Planning    OCCUPATIONAL THERAPY ASSESSMENT   Patient is a 49 year old male admitted 9/9/2024 for spine surgery.  Prior to admission, patient's baseline is independent and home with family (*per patient, he and his wife will be  and daughter will be assisting at d/c).  Patient is currently functioning below baseline with self care and functional mobility.  Patient is requiring up to Mod A for ADLs and Min-CGA for functional mobility as a result of the following impairments: pain, activity tolerance. Occupational Therapy will continue to follow for duration of hospitalization.    Patient will benefit from continued skilled OT Services with no additional skilled services but increased support at home    Evaluation limited by pain; pt was educated on spine precautions and log roll forOOB; tolerating bathroom distance in room but required chair follow 2/2 pain;required assist for LE dressing while seated EOB; Min A for log roll; ambulating <15 feet with RW but required chair to be brought closer 2/2 poor tolerance; anticipate once pain is better controlled, pt will be on track to dc home with support; Continue skilled occupational therapy while IP to maximize patient function and increase patient participation, safety, and independence with basic ADL and everyday activities.     PLAN  OT Treatment Plan: Balance activities;Energy conservation/work simplification techniques;ADL training;Functional transfer training;Endurance training;Patient/Family education;Patient/Family training;Compensatory technique education  OT Device Recommendations: TBD    OCCUPATIONAL THERAPY MEDICAL/SOCIAL HISTORY   Problem List  Principal Problem:    Lumbar disc  herniation with radiculopathy  Active Problems:    S/P lumbar fusion    HOME SITUATION  Type of Home: House  Home Layout: Two level  Lives With: Daughter  Toilet and Equipment: Standard height toilet  Shower/Tub and Equipment: Tub-shower combo  Drives: Yes  Patient Regularly Uses: None    SUBJECTIVE  I need to sit down     OCCUPATIONAL THERAPY EXAMINATION    OBJECTIVE  Precautions: Spine  Fall Risk: Standard fall risk    PAIN ASSESSMENT  Rating: 10    RANGE OF MOTION   Upper extremity ROM is within functional limits     STRENGTH ASSESSMENT  Upper extremity strength is within functional limits     COORDINATION  Gross Motor: WFL   Fine Motor: WFL     ACTIVITIES OF DAILY LIVING ASSESSMENT  AM-PAC ‘6-Clicks’ Inpatient Daily Activity Short Form  How much help from another person does the patient currently need…  -   Putting on and taking off regular lower body clothing?: A Lot  -   Bathing (including washing, rinsing, drying)?: A Lot  -   Toileting, which includes using toilet, bedpan or urinal? : A Lot  -   Putting on and taking off regular upper body clothing?: A Little  -   Taking care of personal grooming such as brushing teeth?: None  -   Eating meals?: None    AM-PAC Score:  Score: 17  Approx Degree of Impairment: 50.11%  Standardized Score (AM-PAC Scale): 37.26  CMS Modifier (G-Code): CK    FUNCTIONAL TRANSFER ASSESSMENT  Sit to Stand: Edge of Bed  Edge of Bed: Minimal Assist    BED MOBILITY  Rolling: Minimal Assist  Supine to Sit : Minimal Assist    BALANCE ASSESSMENT  Static Sitting: Stand-by Assist  Static Standing: Minimal Assist    FUNCTIONAL ADL ASSESSMENT  Eating: Independent  Grooming Seated: Independent  Bathing Seated: Minimal Assist  UB Dressing Seated: Stand-by Assist  LB Dressing Seated: Moderate Assist  Toileting Seated: Minimal Assist    EDUCATION PROVIDED  Patient: Role of Occupational Therapy; Plan of Care; Discharge Recommendations; Functional Transfer Techniques; Posture/Positioning; Surgical  Precautions; Compensatory ADL Techniques  Patient's Response to Education: Verbalized Understanding; Requires Further Education    The patient's Approx Degree of Impairment: 50.11% has been calculated based on documentation in the [facility] '6 clicks' Inpatient Daily Activity Short Form.  Research supports that patients with this level of impairment may benefit from home.  Final disposition will be made by interdisciplinary medical team.     Patient End of Session: Up in chair;Call light within reach;Needs met;RN aware of session/findings    OT Goals  Patients self stated goal is: pain control      Patient will complete functional transfer with supervision  Comment:     Patient will complete toileting with Mod I   Comment:     Patient will tolerate standing for 3-5 minutes in prep for adls with supervision   Comment:    Patient will complete item retrieval with supervision  Comment:          Goals  on:   Frequency: 1-2 more sessions    Patient Evaluation Complexity Level:   Occupational Profile/Medical History LOW - Brief history including review of medical or therapy records    Specific performance deficits impacting engagement in ADL/IADL LOW  1 - 3 performance deficits    Client Assessment/Performance Deficits LOW - No comorbidities nor modifications of tasks    Clinical Decision Making LOW - Analysis of occupational profile, problem-focused assessments, limited treatment options    Overall Complexity LOW     OT Session Time: 18 minutes  Self-Care Home Management: 0 minutes  Therapeutic Activity: 18 minutes    Juan M Fowler, Occupational Therapist, OTR/L ext [phone]

## 2024-09-11 LAB
ANION GAP SERPL CALC-SCNC: 3 MMOL/L (ref 0–18)
BUN BLD-MCNC: 9 MG/DL (ref 9–23)
BUN/CREAT SERPL: 9.7 (ref 10–20)
CALCIUM BLD-MCNC: 9.4 MG/DL (ref 8.7–10.4)
CHLORIDE SERPL-SCNC: 106 MMOL/L (ref 98–112)
CO2 SERPL-SCNC: 31 MMOL/L (ref 21–32)
CREAT BLD-MCNC: 0.93 MG/DL
DEPRECATED RDW RBC AUTO: 41.1 FL (ref 35.1–46.3)
EGFRCR SERPLBLD CKD-EPI 2021: 101 ML/MIN/1.73M2 (ref 60–?)
ERYTHROCYTE [DISTWIDTH] IN BLOOD BY AUTOMATED COUNT: 12.3 % (ref 11–15)
GLUCOSE BLD-MCNC: 114 MG/DL (ref 70–99)
HCT VFR BLD AUTO: 41.9 %
HGB BLD-MCNC: 14.1 G/DL
MCH RBC QN AUTO: 30.4 PG (ref 26–34)
MCHC RBC AUTO-ENTMCNC: 33.7 G/DL (ref 31–37)
MCV RBC AUTO: 90.3 FL
OSMOLALITY SERPL CALC.SUM OF ELEC: 290 MOSM/KG (ref 275–295)
PLATELET # BLD AUTO: 224 10(3)UL (ref 150–450)
POTASSIUM SERPL-SCNC: 4.9 MMOL/L (ref 3.5–5.1)
RBC # BLD AUTO: 4.64 X10(6)UL
SODIUM SERPL-SCNC: 140 MMOL/L (ref 136–145)
WBC # BLD AUTO: 12 X10(3) UL (ref 4–11)

## 2024-09-11 PROCEDURE — 80048 BASIC METABOLIC PNL TOTAL CA: CPT | Performed by: HOSPITALIST

## 2024-09-11 PROCEDURE — 97110 THERAPEUTIC EXERCISES: CPT

## 2024-09-11 PROCEDURE — 97530 THERAPEUTIC ACTIVITIES: CPT

## 2024-09-11 PROCEDURE — 85027 COMPLETE CBC AUTOMATED: CPT | Performed by: HOSPITALIST

## 2024-09-11 RX ORDER — OXYCODONE HCL 10 MG/1
10 TABLET, FILM COATED, EXTENDED RELEASE ORAL EVERY 12 HOURS
Status: DISCONTINUED | OUTPATIENT
Start: 2024-09-11 | End: 2024-09-13

## 2024-09-11 NOTE — PROGRESS NOTES
[facility]  part of [facility]    Progress Note    Louis Raymundo II Patient Status:  Inpatient    1975 MRN [id number]   Location [facility] 4W/SW/SE Attending Alma Lucero MD   Hosp Day # 2 PCP Carlee Ray     Chief Complaint: Back pain     Subjective:   Louis Raymundo II still having a lot of pain. Breaks out into sweats. BP a little high.       Objective:   Objective:    Blood pressure 151/73, pulse 73, temperature 97 °F (36.1 °C), temperature source Oral, resp. rate 16, height 6' (1.829 m), weight 190 lb (86.2 kg), SpO2 97%.    Physical Exam:    General: No acute distress.   Respiratory: Clear to auscultation bilaterally. No wheezes. No rhonchi.  Cardiovascular: S1, S2. Regular rate and rhythm. No murmurs, rubs or gallops.   Abdomen: Soft, nontender, nondistended.  Positive bowel sounds. No rebound or guarding.  Neurologic: No focal neurological deficits.   Musculoskeletal: Moves all extremities.  Extremities: No edema.      Results:   Results:    Labs:  Recent Labs   Lab 24  1011 24  0414   WBC  --  12.0*   HGB  --  14.1   MCV  --  90.3   PLT  --  224.0   INR 0.96  --        Recent Labs   Lab 24  0414   *   BUN 9   CREATSERUM 0.93   CA 9.4      K 4.9      CO2 31.0       Estimated Creatinine Clearance: 105.5 mL/min (based on SCr of 0.93 mg/dL).    Recent Labs   Lab 24  1011   PTP 13.4   INR 0.96            Culture:  No results found for this visit on 24.    Cardiac  No results for input(s): \"TROP\", \"PBNP\" in the last 168 hours.      Imaging: Imaging data reviewed in Epic.  No results found.    Medications:    methocarbamol  500 mg Oral TID    sennosides  17.2 mg Oral Nightly    docusate sodium  100 mg Oral BID    pantoprazole  40 mg Oral QAM AC    pregabalin  200 mg Oral BID    sertraline  50 mg Oral QAM       oxyCODONE-acetaminophen **OR** oxyCODONE-acetaminophen    polyethylene glycol (PEG 3350)    magnesium hydroxide     bisacodyl    fleet enema    ondansetron    prochlorperazine    diphenhydrAMINE **OR** diphenhydrAMINE    benzocaine-menthol    HYDROmorphone **OR** HYDROmorphone    dicyclomine    melatonin      Assessment and Plan:   Assessment & Plan:        Lumbar radiculopathy   Neurosurgery on consult.   S/p L3-S1 decompression, L4-S1 TLIF.   Pain control - switch to percocet 10 g q 4 hoursPRN. Cont IV  0.2 mg dilaudid for severe pain. Cont lyrica. Add robaxin 500mg TID.   Add oxycodone ER BID   Percocet ordered   PT/OT   SW for discharge planning.   Other medical problems  GERD  Anxiety/depression       >55min spent, >50% spent counseling and coordinating care in the form of educating pt/family and d/w consultants and staff. Most of the time spent discussing the above plan.        [facility] Dr Cerda.     Plan of care discussed with patient or family at bedside.    Sarah Claros MD  Hospitalist          Supplementary Documentation:     Quality:  DVT Prophylaxis: SD  CODE status: Full   Dispo: per clinical course            Estimated date of discharge: TBD  Discharge is dependent on: clinical stability  At this point Mr. Raymundo is expected to be discharge to: home

## 2024-09-11 NOTE — PROGRESS NOTES
Neurosurgery Progress Note    Assessment:   Louis Raymundo II in room 423/423-A, is a 49 year old male, Hospital Day ( LOS: 2 days ) hospitalized for Lumbar disc herniation with radiculopathy.      2 Days Post-Op s/p Procedure(s):  L3-4, L4-5, L5-S1 laminectomy.  Left L4-5 and L5-S1 complete facetectomies and transforaminal lumbar interbody fusion  POSTERIOR LUMBAR LAMINECT SPINAL FUS W/INSTR 2 LEV      The patient's other hospital problems include:   Active Hospital Problems    S/P lumbar fusion      *Lumbar disc herniation with radiculopathy        Plan:   -Medical management per hospitalist team  -Continue pain control regimen  -PT/OT  -Encourage ambulation as tolerated  -Ongoing discharge planning    Plan of care discussed with Dr. Cardona  Subjective:   Patient continues to endorse back and left lower extremity pain.  He feels that his left lower extremity pain is slightly improved from yesterday.  No new neurologic complaints.  He was able to take a few pain limiting steps with therapy this morning.    Objective:   VITALS: /82 (BP Location: Right arm)   Pulse 95   Temp 100 °F (37.8 °C) (Oral)   Resp 18   Ht 72\"   Wt 190 lb (86.2 kg)   SpO2 97%   BMI 25.77 kg/m²  Temp (24hrs), Av °F (37.8 °C), Min:99.6 °F (37.6 °C), Max:100.9 °F (38.3 °C)       General: Well developed, well nourished, in no acute distress.     Incisions: Open to air.  Clean, dry, and intact. No signs of gross drainage, warmth, erythema, or hematoma formation       Neurological / Musculoskeletal: Awake, alert, and interactive. Recent and remote memory appear intact. Attention span and concentration are appropriate. No dysarthria. Coordination and motor control grossly intact. Patient follows commands briskly and appropriately.     Lumbar Spine:    Motor / Extremities   Hip   flexion Knee   extension Dorsiflexion EHL Plantarflexion   Sensation   R 5/5 5/5 5/5 5/5 5/5 Intact to light touch   L 5/5 5/5 5/5 5/5 5/5 Intact to light  touch       I&O: I/O last 3 completed shifts:  In: -   Out: 6075 [Urine:6075]       Labs:   Recent Labs   Lab 09/11/24  0414   RBC 4.64   HGB 14.1   HCT 41.9   MCV 90.3   MCH 30.4   MCHC 33.7   RDW 12.3   WBC 12.0*   .0     Recent Labs   Lab 09/11/24  0414   *   BUN 9   CREATSERUM 0.93   EGFRCR 101   CA 9.4      K 4.9      CO2 31.0      Recent Labs   Lab 09/06/24  1011   INR 0.96   PTT 29.3        Imaging:  No new neurosurgical imaging to review at this time      Is this a shared or split note between Advanced Practice Provider and Physician? Yes    Ted Coker PA-C  Physician Assistant- Neurosurgery   [facility]  9/11/2024, 8:55 AM        This document was created using Dragon voice recognition software and transcription variances may occur. Please contact documenting provider for clarification of contents if needed.

## 2024-09-11 NOTE — PHYSICAL THERAPY NOTE
PHYSICAL THERAPY TREATMENT NOTE - INPATIENT     Room Number: 423/423-A       Presenting Problem: L3-S1 laminectomy L L4-S1 TLIF       Problem List  Principal Problem:    Lumbar disc herniation with radiculopathy  Active Problems:    S/P lumbar fusion      PHYSICAL THERAPY ASSESSMENT   Patient demonstrates good  progress this session, goals  remain in progress.      Patient is requiring contact guard assist and minimal assist as a result of the following impairments: decreased functional strength, decreased endurance/aerobic capacity, and pain.     Patient continues to function below baseline with bed mobility, transfers, and gait.  Next session anticipate patient to progress bed mobility, transfers, and gait.  Physical Therapy will continue to follow patient for duration of hospitalization.    Patient continues to benefit from continued skilled PT services: at discharge to promote prior level of function.  Anticipate patient will return home with home health PT.    PLAN  PT Treatment Plan: Bed mobility;Body mechanics;Endurance;Gait training  Frequency (Obs): Daily    SUBJECTIVE  Pt reports being ready for PT RX    OBJECTIVE  Precautions: Spine    WEIGHT BEARING RESTRICTION                PAIN ASSESSMENT   Ratin  Location: low back  Management Techniques: Activity promotion;Body mechanics;Repositioning    BALANCE  Static Sitting: Good  Dynamic Sitting: Fair +  Static Standing: Fair  Dynamic Standing: Fair -    ACTIVITY TOLERANCE                          O2 WALK       AM-PAC '6-Clicks' INPATIENT SHORT FORM - BASIC MOBILITY  How much difficulty does the patient currently have...  Patient Difficulty: Turning over in bed (including adjusting bedclothes, sheets and blankets)?: A Little   Patient Difficulty: Sitting down on and standing up from a chair with arms (e.g., wheelchair, bedside commode, etc.): A Little   Patient Difficulty: Moving from lying on back to sitting on the side of the bed?: A Little   How much  help from another person does the patient currently need...   Help from Another: Moving to and from a bed to a chair (including a wheelchair)?: A Little   Help from Another: Need to walk in hospital room?: A Little   Help from Another: Climbing 3-5 steps with a railing?: A Little     AM-PAC Score:  Raw Score: 18   Approx Degree of Impairment: 46.58%   Standardized Score (AM-PAC Scale): 43.63   CMS Modifier (G-Code): CK    FUNCTIONAL ABILITY STATUS  Functional Mobility/Gait Assessment  Gait Assistance: Contact guard assist  Distance (ft): 2 x 15  Assistive Device: Rolling walker  Pattern: Shuffle  Rolling: minimal assist  Supine to Sit: minimal assist  Sit to Supine: minimal assist  Sit to Stand: minimal assist    Skilled Therapy Provided: Pt seen daily.Min a for bed mobility and transfer.Extra time provided to complete task.EOB sitting balance activity with emphasis on core stabilization. Spinal precautions reviewed;education.Pt educated on deep breathing and relaxation technique.Pt amb 2 x 15 ft with RW and CGA;poor pain control is a limiting factor in pt progress.RN aware.There ex.    The patient's Approx Degree of Impairment: 46.58% has been calculated based on documentation in the [facility] '6 clicks' Inpatient Daily Activity Short Form.  Research supports that patients with this level of impairment may benefit from Home with Home Health PT.  Final disposition will be made by interdisciplinary medical team.    THERAPEUTIC EXERCISES  Lower Extremity Ankle pumps  Glut sets  Hip AB/AD  Heel slides  Quad sets     Position Supine       Patient End of Session: Up in chair;Call light within reach;RN aware of session/findings;All patient questions and concerns addressed    CURRENT GOALS     Patient Goal Patient's self-stated goal is: to go home   Goal #1 Patient is able to demonstrate supine - sit EOB @ level: independent     Goal #1   Current Status Min a   Goal #2 Patient is able to demonstrate transfers Sit to/from Stand  at assistance level: modified independent with walker - rolling     Goal #2  Current Status Min a   Goal #3 Patient is able to ambulate 100 feet with assist device: walker - rolling at assistance level: modified independent   Goal #3   Current Status Pt amb 2 x 15 ft with RW and CGA    Goal #4 Patient will negotiate 12 stairs/one curb w/ assistive device and supervision   Goal #4   Current Status Nt   Goal #5 Patient to demonstrate independence with home activity/exercise instructions provided to patient in preparation for discharge.   Goal #5   Current Status In progress   Goal #6    Goal #6  Current Status      Gait Training:  minutes  Therapeutic Activity: 20 minutes  Neuromuscular Re-education:  minutes  Therapeutic Exercise: 10 minutes  [place] Repositioning:  minutes  Manual Therapy:  minutes  Can add/delete any of these

## 2024-09-11 NOTE — PLAN OF CARE
Patient is Aox4.  Complaining of nerve pain on posterior L leg.  Scheduled robaxin given.  IV dilaudid and percoset given for pain. Sal locked.  Voiding with urinal.    Problem: Patient Centered Care  Goal: Patient preferences are identified and integrated in the patient's plan of care  Description: Interventions:  - What would you like us to know as we care for you?   - Provide timely, complete, and accurate information to patient/family  - Incorporate patient and family knowledge, values, beliefs, and cultural backgrounds into the planning and delivery of care  - Encourage patient/family to participate in care and decision-making at the level they choose  - Honor patient and family perspectives and choices  Outcome: Progressing     Problem: Patient/Family Goals  Goal: Patient/Family Long Term Goal  Description: Patient's Long Term Goal:     Interventions:  -   - See additional Care Plan goals for specific interventions  Outcome: Progressing  Goal: Patient/Family Short Term Goal  Description: Patient's Short Term Goal:     Interventions:   -   - See additional Care Plan goals for specific interventions  Outcome: Progressing     Problem: PAIN - ADULT  Goal: Verbalizes/displays adequate comfort level or patient's stated pain goal  Description: INTERVENTIONS:  - Encourage pt to monitor pain and request assistance  - Assess pain using appropriate pain scale  - Administer analgesics based on type and severity of pain and evaluate response  - Implement non-pharmacological measures as appropriate and evaluate response  - Consider cultural and social influences on pain and pain management  - Manage/alleviate anxiety  - Utilize distraction and/or relaxation techniques  - Monitor for opioid side effects  - Notify MD/LIP if interventions unsuccessful or patient reports new pain  - Anticipate increased pain with activity and pre-medicate as appropriate  Outcome: Progressing     Problem: SAFETY ADULT - FALL  Goal: Free from  fall injury  Description: INTERVENTIONS:  - Assess pt frequently for physical needs  - Identify cognitive and physical deficits and behaviors that affect risk of falls.  - [place] fall precautions as indicated by assessment.  - Educate pt/family on patient safety including physical limitations  - Instruct pt to call for assistance with activity based on assessment  - Modify environment to reduce risk of injury  - Provide assistive devices as appropriate  - Consider OT/PT consult to assist with strengthening/mobility  - Encourage toileting schedule  Outcome: Progressing

## 2024-09-11 NOTE — PLAN OF CARE
Pt. A&O x 4, room air, ambulating with walker/standby assist, voiding in urinal, surgical pain in the lower back, pt also reports pain radiating to left leg, managed by Dilaudid and Oxy PRN, eating and drinking, reg diet, understands the need to wean off of IV pain meds to go home, plan to continue PT and manage pain until adequate for discharge.    Problem: Patient Centered Care  Goal: Patient preferences are identified and integrated in the patient's plan of care  Description: Interventions:  - What would you like us to know as we care for you?   - Provide timely, complete, and accurate information to patient/family  - Incorporate patient and family knowledge, values, beliefs, and cultural backgrounds into the planning and delivery of care  - Encourage patient/family to participate in care and decision-making at the level they choose  - Honor patient and family perspectives and choices  Outcome: Progressing     Interventions:  - See additional Care Plan goals for specific interventions  Outcome: Progressing      Interventions:   - See additional Care Plan goals for specific interventions  Outcome: Progressing     Problem: PAIN - ADULT  Goal: Verbalizes/displays adequate comfort level or patient's stated pain goal  Description: INTERVENTIONS:  - Encourage pt to monitor pain and request assistance  - Assess pain using appropriate pain scale  - Administer analgesics based on type and severity of pain and evaluate response  - Implement non-pharmacological measures as appropriate and evaluate response  - Consider cultural and social influences on pain and pain management  - Manage/alleviate anxiety  - Utilize distraction and/or relaxation techniques  - Monitor for opioid side effects  - Notify MD/LIP if interventions unsuccessful or patient reports new pain  - Anticipate increased pain with activity and pre-medicate as appropriate  Outcome: Progressing     Problem: SAFETY ADULT - FALL  Goal: Free from fall  injury  Description: INTERVENTIONS:  - Assess pt frequently for physical needs  - Identify cognitive and physical deficits and behaviors that affect risk of falls.  - [place] fall precautions as indicated by assessment.  - Educate pt/family on patient safety including physical limitations  - Instruct pt to call for assistance with activity based on assessment  - Modify environment to reduce risk of injury  - Provide assistive devices as appropriate  - Consider OT/PT consult to assist with strengthening/mobility  - Encourage toileting schedule  Outcome: Progressing     Problem: DISCHARGE PLANNING  Goal: Discharge to home or other facility with appropriate resources  Description: INTERVENTIONS:  - Identify barriers to discharge w/pt and caregiver  - Include patient/family/discharge partner in discharge planning  - Arrange for needed discharge resources and transportation as appropriate  - Identify discharge learning needs (meds, wound care, etc)  - Arrange for interpreters to assist at discharge as needed  - Consider post-discharge preferences of patient/family/discharge partner  - Complete POLST form as appropriate  - Assess patient's ability to be responsible for managing their own health  - Refer to Case Management Department for coordinating discharge planning if the patient needs post-hospital services based on physician/LIP order or complex needs related to functional status, cognitive ability or social support system  Outcome: Progressing     Problem: GASTROINTESTINAL - ADULT  Goal: Minimal or absence of nausea and vomiting  Description: INTERVENTIONS:  - Maintain adequate hydration with IV or PO as ordered and tolerated  - Nasogastric tube to low intermittent suction as ordered  - Evaluate effectiveness of ordered antiemetic medications  - Provide nonpharmacologic comfort measures as appropriate  - Advance diet as tolerated, if ordered  - Obtain nutritional consult as needed  - Evaluate fluid  balance  Outcome: Progressing     Problem: METABOLIC/FLUID AND ELECTROLYTES - ADULT  Goal: Electrolytes maintained within normal limits  Description: INTERVENTIONS:  - Monitor labs and rhythm and assess patient for signs and symptoms of electrolyte imbalances  - Administer electrolyte replacement as ordered  - Monitor response to electrolyte replacements, including rhythm and repeat lab results as appropriate  - Fluid restriction as ordered  - Instruct patient on fluid and nutrition restrictions as appropriate  Outcome: Progressing     Problem: SKIN/TISSUE INTEGRITY - ADULT  Goal: Skin integrity remains intact  Description: INTERVENTIONS  - Assess and document risk factors for pressure ulcer development  - Assess and document skin integrity  - Monitor for areas of redness and/or skin breakdown  - Initiate interventions, skin care algorithm/standards of care as needed  Outcome: Progressing  Goal: Incision(s), wounds(s) or drain site(s) healing without S/S of infection  Description: INTERVENTIONS:  - Assess and document risk factors for pressure ulcer development  - Assess and document skin integrity  - Assess and document dressing/incision, wound bed, drain sites and surrounding tissue  - Implement wound care per orders  - Initiate isolation precautions as appropriate  - Initiate Pressure Ulcer prevention bundle as indicated  Outcome: Progressing

## 2024-09-11 NOTE — CM/SW NOTE
Per chart, PT worked w/ pt today and Anticipated therapy need: Home with Home Healthcare    SW consulted w/ PTA Joseylia - per PTA, final anticipated need is pending pt's progress. Today, pt was limited by uncontrolled pain.    Pt has [facility] insurance. SW attempted to contact [facility] via phone #: [phone] to confirm if pt is service connected and how to arrange HH services if needed. There was no answer. Vmail left w/ request to call back.    PLAN: Home w/ Outpatient PT vs HH PT - pending pt's progress/needs & med clear      SW/CM to remain available for support and/or discharge planning.         Cleo, MSW, LSW [id number]

## 2024-09-12 LAB
ANION GAP SERPL CALC-SCNC: 5 MMOL/L (ref 0–18)
BUN BLD-MCNC: 8 MG/DL (ref 9–23)
BUN/CREAT SERPL: 10.1 (ref 10–20)
CALCIUM BLD-MCNC: 9.7 MG/DL (ref 8.7–10.4)
CHLORIDE SERPL-SCNC: 104 MMOL/L (ref 98–112)
CO2 SERPL-SCNC: 30 MMOL/L (ref 21–32)
CREAT BLD-MCNC: 0.79 MG/DL
DEPRECATED RDW RBC AUTO: 38.6 FL (ref 35.1–46.3)
EGFRCR SERPLBLD CKD-EPI 2021: 109 ML/MIN/1.73M2 (ref 60–?)
ERYTHROCYTE [DISTWIDTH] IN BLOOD BY AUTOMATED COUNT: 12 % (ref 11–15)
GLUCOSE BLD-MCNC: 103 MG/DL (ref 70–99)
HCT VFR BLD AUTO: 39.8 %
HGB BLD-MCNC: 13.9 G/DL
MCH RBC QN AUTO: 30.4 PG (ref 26–34)
MCHC RBC AUTO-ENTMCNC: 34.9 G/DL (ref 31–37)
MCV RBC AUTO: 87.1 FL
OSMOLALITY SERPL CALC.SUM OF ELEC: 287 MOSM/KG (ref 275–295)
PLATELET # BLD AUTO: 239 10(3)UL (ref 150–450)
POTASSIUM SERPL-SCNC: 4.7 MMOL/L (ref 3.5–5.1)
RBC # BLD AUTO: 4.57 X10(6)UL
SODIUM SERPL-SCNC: 139 MMOL/L (ref 136–145)
WBC # BLD AUTO: 11.7 X10(3) UL (ref 4–11)

## 2024-09-12 PROCEDURE — 97530 THERAPEUTIC ACTIVITIES: CPT

## 2024-09-12 PROCEDURE — 80048 BASIC METABOLIC PNL TOTAL CA: CPT | Performed by: HOSPITALIST

## 2024-09-12 PROCEDURE — 85027 COMPLETE CBC AUTOMATED: CPT | Performed by: HOSPITALIST

## 2024-09-12 PROCEDURE — 97110 THERAPEUTIC EXERCISES: CPT

## 2024-09-12 RX ORDER — HYDROMORPHONE HYDROCHLORIDE 1 MG/ML
0.2 INJECTION, SOLUTION INTRAMUSCULAR; INTRAVENOUS; SUBCUTANEOUS EVERY 6 HOURS PRN
Status: DISCONTINUED | OUTPATIENT
Start: 2024-09-12 | End: 2024-09-13

## 2024-09-12 RX ORDER — TIZANIDINE 2 MG/1
2 TABLET ORAL EVERY 6 HOURS PRN
Status: DISCONTINUED | OUTPATIENT
Start: 2024-09-12 | End: 2024-09-13

## 2024-09-12 NOTE — PROGRESS NOTES
KAREN REYEZ M.D., F.A.A.N.S.     of Neurosurgery  [facility]  Board Certified Neurosurgeon                          [facility]  part of [facility] Neurosurgery        [place] [facility]      [address]  [address]  [place] [address]    PHONE  [phone]          FAX  [phone]    [email or web address]      NEUROSURGERY PROGRESS NOTE      Louis Raymundo II Patient Status:  Inpatient    1975 MRN [id number]   Location [facility] 4W/SW/SE Attending Sarah Claros MD   Hosp Day # 3 PCP Carlee Ray     Subjective:  Louis Raymundo II is a(n) 49 year old male.  Alert, orientated x3.  Cooperative.  No apparent distress.      Vital Signs:    Temp:  [97 °F (36.1 °C)-100.4 °F (38 °C)] 100.4 °F (38 °C)  Pulse:  [73-95] 89  Resp:  [16-18] 18  BP: (124-151)/(73-84) 128/83  SpO2:  [95 %-98 %] 95 %    I/O:  I/O last 3 completed shifts:  In: -   Out: 4325 [Urine:4325]    Inpatient Medications:    Current Facility-Administered Medications:     oxyCODONE ER (OxyCONTIN ER) 12 hr tab 10 mg, 10 mg, Oral, 2 times per day    oxyCODONE-acetaminophen (Percocet) 5-325 MG per tab 1 tablet, 1 tablet, Oral, Q4H PRN **OR** oxyCODONE-acetaminophen (Percocet) 5-325 MG per tab 2 tablet, 2 tablet, Oral, Q4H PRN    methocarbamol (Robaxin) tab 500 mg, 500 mg, Oral, TID    sennosides (Senokot) tab 17.2 mg, 17.2 mg, Oral, Nightly    docusate sodium (Colace) cap 100 mg, 100 mg, Oral, BID    polyethylene glycol (PEG 3350) (Miralax) 17 g oral packet 17 g, 17 g, Oral, Daily PRN    magnesium hydroxide (Milk of Magnesia) 400 MG/5ML oral suspension 30 mL, 30 mL, Oral, Daily PRN    bisacodyl (Dulcolax) 10 MG rectal suppository 10 mg, 10 mg, Rectal, Daily PRN    fleet enema (Fleet) rectal enema 133 mL, 1 enema, Rectal, Once PRN    ondansetron (Zofran) 4 MG/2ML injection 4 mg, 4 mg, Intravenous, Q6H PRN     prochlorperazine (Compazine) 10 MG/2ML injection 5 mg, 5 mg, Intravenous, Q8H PRN    diphenhydrAMINE (Benadryl) cap/tab 25 mg, 25 mg, Oral, Q4H PRN **OR** diphenhydrAMINE (Benadryl) 50 mg/mL  injection 25 mg, 25 mg, Intravenous, Q4H PRN    benzocaine-menthol (Cepacol) lozenge 1 lozenge, 1 lozenge, Buccal, Q15 Min PRN    HYDROmorphone (Dilaudid) 1 MG/ML injection 0.4 mg, 0.4 mg, Intravenous, Q2H PRN **OR** HYDROmorphone (Dilaudid) 1 MG/ML injection 0.8 mg, 0.8 mg, Intravenous, Q2H PRN    dicyclomine (Bentyl) cap 10 mg, 10 mg, Oral, Q4H PRN    pantoprazole (Protonix) DR tab 40 mg, 40 mg, Oral, QAM AC    pregabalin (Lyrica) cap 200 mg, 200 mg, Oral, BID    sertraline (Zoloft) tab 50 mg, 50 mg, Oral, QAM    melatonin cap/tab 5 mg, 5 mg, Oral, Nightly PRN    Labs:  Lab Results   Component Value Date    WBC 11.7 (H) 09/12/2024    HGB 13.9 09/12/2024    .0 09/12/2024    BUN 8 (L) 09/12/2024     09/12/2024    K 4.7 09/12/2024    CO2 30.0 09/12/2024     (H) 09/12/2024    ALB 5.0 (H) 04/23/2024    PTT 29.3 09/06/2024    INR 0.96 09/06/2024         Neurological Exam:  AAOx3, following commands  PERRLA  EOMI  MAEx4  Sensation symmetrical  Incision c/d/i  Abdomen:  Soft, non-distended, non-tender, with no rebound or guarding.  No peritoneal signs.   Extremities:  Non-tender, no lower extremity edema noted.        Imaging:  No results found.    Assessment/Plan:  Principal Problem:    Lumbar disc herniation with radiculopathy  Active Problems:    S/P lumbar fusion    POD#2 s/p TLIF, doing well.  Dispo pending.    All questions and concerns were addressed. We appreciate the opportunity to participate in the care of this patient. Please do not hesitate to call our office ([phone]) with any issues.          Shawn Cardona M.D., F.A.A.N.S.    9/12/2024  7:14 AM    This note has been dictated utilizing voice recognition software. Unfortunately, this may lead to occasional typographical errors. If there are any  questions regarding this, please do not hesitate to contact our office. 2

## 2024-09-12 NOTE — PLAN OF CARE
Patient has been ambulating 1-2 assist w/ walker. Pain is being managed w/ oxy IR and oxy ER. Dilaudid PRN, dose decreased to attempt to wean off for dc. Robaxin discontinued and PRN Tizanidine added. Is voiding using urinal. Has TEDs and SCDs for dvt prophylaxis. DC plan pending, possible home tomorrow.     Problem: Patient Centered Care  Goal: Patient preferences are identified and integrated in the patient's plan of care  Description: Interventions:  - What would you like us to know as we care for you? I have a daughter   - Provide timely, complete, and accurate information to patient/family  - Incorporate patient and family knowledge, values, beliefs, and cultural backgrounds into the planning and delivery of care  - Encourage patient/family to participate in care and decision-making at the level they choose  - Honor patient and family perspectives and choices  Outcome: Progressing     Problem: Patient/Family Goals  Goal: Patient/Family Long Term Goal  Description: Patient's Long Term Goal: To keep pain level <9    Interventions:  - Pain medications  - ambulation  - walker   - See additional Care Plan goals for specific interventions  Outcome: Progressing  Goal: Patient/Family Short Term Goal  Description: Patient's Short Term Goal: to go home     Interventions:   - follow plan of care  - See additional Care Plan goals for specific interventions  Outcome: Progressing     Problem: PAIN - ADULT  Goal: Verbalizes/displays adequate comfort level or patient's stated pain goal  Description: INTERVENTIONS:  - Encourage pt to monitor pain and request assistance  - Assess pain using appropriate pain scale  - Administer analgesics based on type and severity of pain and evaluate response  - Implement non-pharmacological measures as appropriate and evaluate response  - Consider cultural and social influences on pain and pain management  - Manage/alleviate anxiety  - Utilize distraction and/or relaxation techniques  -  Monitor for opioid side effects  - Notify MD/LIP if interventions unsuccessful or patient reports new pain  - Anticipate increased pain with activity and pre-medicate as appropriate  Outcome: Progressing     Problem: SAFETY ADULT - FALL  Goal: Free from fall injury  Description: INTERVENTIONS:  - Assess pt frequently for physical needs  - Identify cognitive and physical deficits and behaviors that affect risk of falls.  - Irons fall precautions as indicated by assessment.  - Educate pt/family on patient safety including physical limitations  - Instruct pt to call for assistance with activity based on assessment  - Modify environment to reduce risk of injury  - Provide assistive devices as appropriate  - Consider OT/PT consult to assist with strengthening/mobility  - Encourage toileting schedule  Outcome: Progressing     Problem: DISCHARGE PLANNING  Goal: Discharge to home or other facility with appropriate resources  Description: INTERVENTIONS:  - Identify barriers to discharge w/pt and caregiver  - Include patient/family/discharge partner in discharge planning  - Arrange for needed discharge resources and transportation as appropriate  - Identify discharge learning needs (meds, wound care, etc)  - Arrange for interpreters to assist at discharge as needed  - Consider post-discharge preferences of patient/family/discharge partner  - Complete POLST form as appropriate  - Assess patient's ability to be responsible for managing their own health  - Refer to Case Management Department for coordinating discharge planning if the patient needs post-hospital services based on physician/LIP order or complex needs related to functional status, cognitive ability or social support system  Outcome: Progressing     Problem: GASTROINTESTINAL - ADULT  Goal: Minimal or absence of nausea and vomiting  Description: INTERVENTIONS:  - Maintain adequate hydration with IV or PO as ordered and tolerated  - Nasogastric tube to low  intermittent suction as ordered  - Evaluate effectiveness of ordered antiemetic medications  - Provide nonpharmacologic comfort measures as appropriate  - Advance diet as tolerated, if ordered  - Obtain nutritional consult as needed  - Evaluate fluid balance  Outcome: Progressing     Problem: METABOLIC/FLUID AND ELECTROLYTES - ADULT  Goal: Electrolytes maintained within normal limits  Description: INTERVENTIONS:  - Monitor labs and rhythm and assess patient for signs and symptoms of electrolyte imbalances  - Administer electrolyte replacement as ordered  - Monitor response to electrolyte replacements, including rhythm and repeat lab results as appropriate  - Fluid restriction as ordered  - Instruct patient on fluid and nutrition restrictions as appropriate  Outcome: Progressing     Problem: SKIN/TISSUE INTEGRITY - ADULT  Goal: Skin integrity remains intact  Description: INTERVENTIONS  - Assess and document risk factors for pressure ulcer development  - Assess and document skin integrity  - Monitor for areas of redness and/or skin breakdown  - Initiate interventions, skin care algorithm/standards of care as needed  Outcome: Progressing  Goal: Incision(s), wounds(s) or drain site(s) healing without S/S of infection  Description: INTERVENTIONS:  - Assess and document risk factors for pressure ulcer development  - Assess and document skin integrity  - Assess and document dressing/incision, wound bed, drain sites and surrounding tissue  - Implement wound care per orders  - Initiate isolation precautions as appropriate  - Initiate Pressure Ulcer prevention bundle as indicated  Outcome: Progressing

## 2024-09-12 NOTE — PHYSICAL THERAPY NOTE
PHYSICAL THERAPY TREATMENT NOTE - INPATIENT     Room Number: 423/423-A       Presenting Problem: L3-S1 laminectomy L L4-S1 TLIF       Problem List  Principal Problem:    Lumbar disc herniation with radiculopathy  Active Problems:    S/P lumbar fusion      PHYSICAL THERAPY ASSESSMENT   Patient demonstrates good  progress this session, goals  remain in progress.      Patient is requiring contact guard assist and minimal assist as a result of the following impairments: decreased functional strength, decreased endurance/aerobic capacity, and pain.     Patient continues to function below baseline with bed mobility, transfers, and gait.  Next session anticipate patient to progress bed mobility, transfers, and gait.  Physical Therapy will continue to follow patient for duration of hospitalization.    Patient continues to benefit from continued skilled PT services: at discharge to promote prior level of function.  Anticipate patient will return home with OP PT.    PLAN  PT Treatment Plan: Bed mobility;Body mechanics;Endurance;Gait training  Frequency (Obs): Daily    SUBJECTIVE  Pt  reports being ready for PT RX    OBJECTIVE  Precautions: Spine    WEIGHT BEARING RESTRICTION                PAIN ASSESSMENT   Ratin  Location: low back  Management Techniques: Activity promotion;Body mechanics;Repositioning    BALANCE  Static Sitting: Good  Dynamic Sitting: Fair +  Static Standing: Fair  Dynamic Standing: Fair -    ACTIVITY TOLERANCE                          O2 WALK       AM-PAC '6-Clicks' INPATIENT SHORT FORM - BASIC MOBILITY  How much difficulty does the patient currently have...  Patient Difficulty: Turning over in bed (including adjusting bedclothes, sheets and blankets)?: A Little   Patient Difficulty: Sitting down on and standing up from a chair with arms (e.g., wheelchair, bedside commode, etc.): A Little   Patient Difficulty: Moving from lying on back to sitting on the side of the bed?: A Little   How much help from  another person does the patient currently need...   Help from Another: Moving to and from a bed to a chair (including a wheelchair)?: A Little   Help from Another: Need to walk in hospital room?: A Little   Help from Another: Climbing 3-5 steps with a railing?: A Little     AM-PAC Score:  Raw Score: 18   Approx Degree of Impairment: 46.58%   Standardized Score (AM-PAC Scale): 43.63   CMS Modifier (G-Code): CK    FUNCTIONAL ABILITY STATUS  Functional Mobility/Gait Assessment  Gait Assistance: Contact guard assist  Distance (ft): 2 x 30  Assistive Device: Rolling walker  Pattern: Shuffle  Rolling: minimal assist  Supine to Sit: minimal assist  Sit to Supine: minimal assist  Sit to Stand: minimal assist    Skilled Therapy Provided: Pt seen  daily.Min a for bed mobility and transfer.Extra time provided to complete task.EOB sitting balance activity with emphasis on core stabilization.Spinal precautions reviewed;education;all questions and concerns addressed.There ex.Pt  with inc amb distance to 2 x 30 ft with RW and CGA.Provided cuing for gait pattern as well as for postural awareness.Cooperative and motivated.    The patient's Approx Degree of Impairment: 46.58% has been calculated based on documentation in the [facility] '6 clicks' Inpatient Daily Activity Short Form.  Research supports that patients with this level of impairment may benefit from Home with OP PT.  Final disposition will be made by interdisciplinary medical team.    THERAPEUTIC EXERCISES  Lower Extremity Ankle pumps  Glut sets  Quad sets     Position Supine       Patient End of Session: Up in chair;Call light within reach;RN aware of session/findings;All patient questions and concerns addressed    CURRENT GOALS     Patient Goal Patient's self-stated goal is: to go home   Goal #1 Patient is able to demonstrate supine - sit EOB @ level: independent     Goal #1   Current Status Min a   Goal #2 Patient is able to demonstrate transfers Sit to/from Stand at  assistance level: modified independent with walker - rolling     Goal #2  Current Status Min a   Goal #3 Patient is able to ambulate 100 feet with assist device: walker - rolling at assistance level: modified independent   Goal #3   Current Status Pt amb 2 x 30 ft with RW and CGA    Goal #4 Patient will negotiate 12 stairs/one curb w/ assistive device and supervision   Goal #4   Current Status Nt   Goal #5 Patient to demonstrate independence with home activity/exercise instructions provided to patient in preparation for discharge.   Goal #5   Current Status In progress   Goal #6    Goal #6  Current Status      Gait Training:  minutes  Therapeutic Activity: 15   minutes  Neuromuscular Re-education:  minutes  Therapeutic Exercise: 15   minutes  Canalith Repositioning:  minutes  Manual Therapy:  minutes  Can add/delete any of these

## 2024-09-12 NOTE — PLAN OF CARE
POD 3. AOX4 on RA. 1x walker. SCDs. General diet. Voiding via urinal. Dilaudid, Percocet, Robaxin, and Oxycontin ER given for pain. Plan for Home w/ HHPT vs Outpt PT.  Gel ice.       Problem: Patient Centered Care  Goal: Patient preferences are identified and integrated in the patient's plan of care  Description: Interventions:  - What would you like us to know as we care for you?   - Provide timely, complete, and accurate information to patient/family  - Incorporate patient and family knowledge, values, beliefs, and cultural backgrounds into the planning and delivery of care  - Encourage patient/family to participate in care and decision-making at the level they choose  - Honor patient and family perspectives and choices  9/10/2024 0426 by Agustin Donaldson, RN  Outcome: Progressing  9/10/2024 0425 by Agustin Donaldson, RN  Outcome: Progressing     Problem: PAIN - ADULT  Goal: Verbalizes/displays adequate comfort level or patient's stated pain goal  Description: INTERVENTIONS:  - Encourage pt to monitor pain and request assistance  - Assess pain using appropriate pain scale  - Administer analgesics based on type and severity of pain and evaluate response  - Implement non-pharmacological measures as appropriate and evaluate response  - Consider cultural and social influences on pain and pain management  - Manage/alleviate anxiety  - Utilize distraction and/or relaxation techniques  - Monitor for opioid side effects  - Notify MD/LIP if interventions unsuccessful or patient reports new pain  - Anticipate increased pain with activity and pre-medicate as appropriate  Outcome: Progressing     Problem: SAFETY ADULT - FALL  Goal: Free from fall injury  Description: INTERVENTIONS:  - Assess pt frequently for physical needs  - Identify cognitive and physical deficits and behaviors that affect risk of falls.  - Erwin fall precautions as indicated by assessment.  - Educate pt/family on patient safety including physical  limitations  - Instruct pt to call for assistance with activity based on assessment  - Modify environment to reduce risk of injury  - Provide assistive devices as appropriate  - Consider OT/PT consult to assist with strengthening/mobility  - Encourage toileting schedule  Outcome: Progressing     Problem: DISCHARGE PLANNING  Goal: Discharge to home or other facility with appropriate resources  Description: INTERVENTIONS:  - Identify barriers to discharge w/pt and caregiver  - Include patient/family/discharge partner in discharge planning  - Arrange for needed discharge resources and transportation as appropriate  - Identify discharge learning needs (meds, wound care, etc)  - Arrange for interpreters to assist at discharge as needed  - Consider post-discharge preferences of patient/family/discharge partner  - Complete POLST form as appropriate  - Assess patient's ability to be responsible for managing their own health  - Refer to Case Management Department for coordinating discharge planning if the patient needs post-hospital services based on physician/LIP order or complex needs related to functional status, cognitive ability or social support system  Outcome: Progressing     Problem: GASTROINTESTINAL - ADULT  Goal: Minimal or absence of nausea and vomiting  Description: INTERVENTIONS:  - Maintain adequate hydration with IV or PO as ordered and tolerated  - Nasogastric tube to low intermittent suction as ordered  - Evaluate effectiveness of ordered antiemetic medications  - Provide nonpharmacologic comfort measures as appropriate  - Advance diet as tolerated, if ordered  - Obtain nutritional consult as needed  - Evaluate fluid balance  Outcome: Progressing     Problem: METABOLIC/FLUID AND ELECTROLYTES - ADULT  Goal: Electrolytes maintained within normal limits  Description: INTERVENTIONS:  - Monitor labs and rhythm and assess patient for signs and symptoms of electrolyte imbalances  - Administer electrolyte  replacement as ordered  - Monitor response to electrolyte replacements, including rhythm and repeat lab results as appropriate  - Fluid restriction as ordered  - Instruct patient on fluid and nutrition restrictions as appropriate  Outcome: Progressing     Problem: SKIN/TISSUE INTEGRITY - ADULT  Goal: Skin integrity remains intact  Description: INTERVENTIONS  - Assess and document risk factors for pressure ulcer development  - Assess and document skin integrity  - Monitor for areas of redness and/or skin breakdown  - Initiate interventions, skin care algorithm/standards of care as needed  Outcome: Progressing  Goal: Incision(s), wounds(s) or drain site(s) healing without S/S of infection  Description: INTERVENTIONS:  - Assess and document risk factors for pressure ulcer development  - Assess and document skin integrity  - Assess and document dressing/incision, wound bed, drain sites and surrounding tissue  - Implement wound care per orders  - Initiate isolation precautions as appropriate  - Initiate Pressure Ulcer prevention bundle as indicated  Outcome: Progressing

## 2024-09-12 NOTE — PROGRESS NOTES
[facility]  part of [facility]    Progress Note    Louis Raymundo II Patient Status:  Inpatient    1975 MRN [id number]   Location [facility] 4W/SW/SE Attending Alma Lucero MD   Hosp Day # 3 PCP Carlee Ray     Chief Complaint: Back pain     Subjective:   Louis Raymundo II still requiring IV dilaudid. Wanting it every 2 hours. Patient is slurring his words a little. Wants tizanidine       Objective:   Objective:    Blood pressure 123/75, pulse 96, temperature 99.8 °F (37.7 °C), temperature source Axillary, resp. rate 18, height 6' (1.829 m), weight 190 lb (86.2 kg), SpO2 94%.    Physical Exam:    General: No acute distress.   Respiratory: Clear to auscultation bilaterally. No wheezes. No rhonchi.  Cardiovascular: S1, S2. Regular rate and rhythm. No murmurs, rubs or gallops.   Abdomen: Soft, nontender, nondistended.  Positive bowel sounds. No rebound or guarding.  Neurologic: No focal neurological deficits.   Musculoskeletal: Moves all extremities.  Extremities: No edema.      Results:   Results:    Labs:  Recent Labs   Lab 24  1011 24  0414 24  0435   WBC  --  12.0* 11.7*   HGB  --  14.1 13.9   MCV  --  90.3 87.1   PLT  --  224.0 239.0   INR 0.96  --   --        Recent Labs   Lab 24  0414 24  0435   * 103*   BUN 9 8*   CREATSERUM 0.93 0.79   CA 9.4 9.7    139   K 4.9 4.7    104   CO2 31.0 30.0       Estimated Creatinine Clearance: 124.1 mL/min (based on SCr of 0.79 mg/dL).    Recent Labs   Lab 24  1011   PTP 13.4   INR 0.96            Culture:  No results found for this visit on 24.    Cardiac  No results for input(s): \"TROP\", \"PBNP\" in the last 168 hours.      Imaging: Imaging data reviewed in Epic.  No results found.    Medications:    oxyCODONE ER  10 mg Oral 2 times per day    sennosides  17.2 mg Oral Nightly    docusate sodium  100 mg Oral BID    pantoprazole  40 mg Oral QAM AC    pregabalin  200 mg Oral BID     sertraline  50 mg Oral QAM       HYDROmorphone    tiZANidine    oxyCODONE-acetaminophen **OR** oxyCODONE-acetaminophen    polyethylene glycol (PEG 3350)    magnesium hydroxide    bisacodyl    fleet enema    ondansetron    prochlorperazine    diphenhydrAMINE **OR** diphenhydrAMINE    benzocaine-menthol    dicyclomine    melatonin      Assessment and Plan:   Assessment & Plan:        Lumbar radiculopathy   Neurosurgery on consult.   S/p L3-S1 decompression, L4-S1 TLIF.   Pain control - switch to percocet 10 g q 4 hoursPRN. Cont IV  0.2 mg dilaudid for severe pain. Cont lyrica. Change robaxin to tizanidine.    Added oxycodone ER BID   Percocet ordered   PT/OT   SW for discharge planning.   Wean IV dilaudid  Discharge home tomorrow  Other medical problems  GERD  Anxiety/depression       >55min spent, >50% spent counseling and coordinating care in the form of educating pt/family and d/w consultants and staff. Most of the time spent discussing the above plan.        [facility] Dr Cerda.     Plan of care discussed with patient or family at bedside.    Sarah Claros MD  Hospitalist          Supplementary Documentation:     Quality:  DVT Prophylaxis: SD  CODE status: Full   Dispo: per clinical course            Estimated date of discharge: TBD  Discharge is dependent on: clinical stability  At this point Mr. Raymundo is expected to be discharge to: home

## 2024-09-13 VITALS
WEIGHT: 190 LBS | HEART RATE: 85 BPM | HEIGHT: 72 IN | TEMPERATURE: 99 F | RESPIRATION RATE: 18 BRPM | OXYGEN SATURATION: 96 % | DIASTOLIC BLOOD PRESSURE: 76 MMHG | BODY MASS INDEX: 25.73 KG/M2 | SYSTOLIC BLOOD PRESSURE: 127 MMHG

## 2024-09-13 LAB
ANION GAP SERPL CALC-SCNC: 5 MMOL/L (ref 0–18)
BUN BLD-MCNC: 10 MG/DL (ref 9–23)
BUN/CREAT SERPL: 11.8 (ref 10–20)
CALCIUM BLD-MCNC: 10 MG/DL (ref 8.7–10.4)
CHLORIDE SERPL-SCNC: 105 MMOL/L (ref 98–112)
CO2 SERPL-SCNC: 32 MMOL/L (ref 21–32)
CREAT BLD-MCNC: 0.85 MG/DL
DEPRECATED RDW RBC AUTO: 38.8 FL (ref 35.1–46.3)
EGFRCR SERPLBLD CKD-EPI 2021: 107 ML/MIN/1.73M2 (ref 60–?)
ERYTHROCYTE [DISTWIDTH] IN BLOOD BY AUTOMATED COUNT: 12.1 % (ref 11–15)
GLUCOSE BLD-MCNC: 108 MG/DL (ref 70–99)
HCT VFR BLD AUTO: 41.3 %
HGB BLD-MCNC: 14.1 G/DL
MCH RBC QN AUTO: 30.1 PG (ref 26–34)
MCHC RBC AUTO-ENTMCNC: 34.1 G/DL (ref 31–37)
MCV RBC AUTO: 88.2 FL
OSMOLALITY SERPL CALC.SUM OF ELEC: 294 MOSM/KG (ref 275–295)
PLATELET # BLD AUTO: 300 10(3)UL (ref 150–450)
POTASSIUM SERPL-SCNC: 4.7 MMOL/L (ref 3.5–5.1)
RBC # BLD AUTO: 4.68 X10(6)UL
SODIUM SERPL-SCNC: 142 MMOL/L (ref 136–145)
WBC # BLD AUTO: 10.8 X10(3) UL (ref 4–11)

## 2024-09-13 PROCEDURE — 85027 COMPLETE CBC AUTOMATED: CPT | Performed by: HOSPITALIST

## 2024-09-13 PROCEDURE — 80048 BASIC METABOLIC PNL TOTAL CA: CPT | Performed by: HOSPITALIST

## 2024-09-13 PROCEDURE — 97116 GAIT TRAINING THERAPY: CPT

## 2024-09-13 PROCEDURE — 97530 THERAPEUTIC ACTIVITIES: CPT

## 2024-09-13 NOTE — PHYSICAL THERAPY NOTE
PHYSICAL THERAPY TREATMENT NOTE - INPATIENT     Room Number: 423/423-A       Presenting Problem: L3-S1 laminectomy L L4-S1 TLIF       Problem List  Principal Problem:    Lumbar disc herniation with radiculopathy  Active Problems:    S/P lumbar fusion      PHYSICAL THERAPY ASSESSMENT   Patient demonstrates good  progress this session, goals  remain in progress.      Patient is requiring contact guard assist and minimal assist as a result of the following impairments: decreased functional strength, decreased endurance/aerobic capacity, and pain.     Patient continues to function below baseline with bed mobility, transfers, and gait.  Next session anticipate patient to progress bed mobility, transfers, and gait.  Physical Therapy will continue to follow patient for duration of hospitalization.    Patient continues to benefit from continued skilled PT services: at discharge to promote prior level of function.  Anticipate patient will return home with OP PT.    PLAN  PT Treatment Plan: Bed mobility;Body mechanics;Energy conservation;Gait training  Frequency (Obs): Daily    SUBJECTIVE  Pt  reports being ready for PT RX    OBJECTIVE  Precautions: Knee immobilizer    WEIGHT BEARING RESTRICTION                PAIN ASSESSMENT   Ratin  Location: low back  Management Techniques: Activity promotion;Body mechanics;Repositioning    BALANCE  Static Sitting: Good  Dynamic Sitting: Fair +  Static Standing: Fair  Dynamic Standing: Fair -    ACTIVITY TOLERANCE                          O2 WALK       AM-PAC '6-Clicks' INPATIENT SHORT FORM - BASIC MOBILITY  How much difficulty does the patient currently have...  Patient Difficulty: Turning over in bed (including adjusting bedclothes, sheets and blankets)?: A Little   Patient Difficulty: Sitting down on and standing up from a chair with arms (e.g., wheelchair, bedside commode, etc.): A Little   Patient Difficulty: Moving from lying on back to sitting on the side of the bed?: A Little    How much help from another person does the patient currently need...   Help from Another: Moving to and from a bed to a chair (including a wheelchair)?: A Little   Help from Another: Need to walk in hospital room?: A Little   Help from Another: Climbing 3-5 steps with a railing?: A Little     AM-PAC Score:  Raw Score: 18   Approx Degree of Impairment: 46.58%   Standardized Score (AM-PAC Scale): 43.63   CMS Modifier (G-Code): CK    FUNCTIONAL ABILITY STATUS  Functional Mobility/Gait Assessment  Gait Assistance: Contact guard assist  Distance (ft): 125  Assistive Device: Rolling walker  Pattern: Shuffle  Stairs: Stairs  How Many Stairs: 4  Device: 1 Rail  Assist: Contact guard assist  Pattern: Ascend and Descend  Rolling: minimal assist  Supine to Sit: minimal assist  Sit to Supine: minimal assist  Sit to Stand: minimal assist    Skilled Therapy Provided: Pt seen  daily.Min a for bed mobility and transfer.Extra time provided to complete task.EOB sitting balance activity with emphasis on core stabilization.Spinal precautions reviewed;education;all questions and concerns addressed.There ex.Pt  with inc amb distance to   125  ft with RW and CGA.Provided cuing for gait pattern as well as for postural awareness.Cooperative and motivated.Navigated 4 stairs with CGA.    The patient's Approx Degree of Impairment: 46.58% has been calculated based on documentation in the [facility] '6 clicks' Inpatient Daily Activity Short Form.  Research supports that patients with this level of impairment may benefit from Home with OP PT.  Final disposition will be made by interdisciplinary medical team.    THERAPEUTIC EXERCISES  Lower Extremity Ankle pumps  Glut sets  Quad sets     Position Supine       Patient End of Session: Up in chair;Call light within reach;RN aware of session/findings;All patient questions and concerns addressed    CURRENT GOALS     Patient Goal Patient's self-stated goal is: to go home   Goal #1 Patient is able to  demonstrate supine - sit EOB @ level: independent     Goal #1   Current Status Min a   Goal #2 Patient is able to demonstrate transfers Sit to/from Stand at assistance level: modified independent with walker - rolling     Goal #2  Current Status Min a   Goal #3 Patient is able to ambulate 100 feet with assist device: walker - rolling at assistance level: modified independent   Goal #3   Current Status Pt amb   125 ft with RW and CGA    Goal #4 Patient will negotiate 12 stairs/one curb w/ assistive device and supervision   Goal #4   Current Status Navigated 4 stairs with CGA   Goal #5 Patient to demonstrate independence with home activity/exercise instructions provided to patient in preparation for discharge.   Goal #5   Current Status In progress   Goal #6    Goal #6  Current Status      Gait Training: 15 minutes  Therapeutic Activity: 15   minutes  Neuromuscular Re-education:  minutes  Therapeutic Exercise:    minutes  Canalith Repositioning:  minutes  Manual Therapy:  minutes  Can add/delete any of these

## 2024-09-13 NOTE — PHYSICAL THERAPY NOTE
PHYSICAL THERAPY TREATMENT NOTE - INPATIENT     Room Number: 423/423-A       Presenting Problem: L3-S1 laminectomy L L4-S1 TLIF       Problem List  Principal Problem:    Lumbar disc herniation with radiculopathy  Active Problems:    S/P lumbar fusion      PHYSICAL THERAPY ASSESSMENT   Patient demonstrates good  progress this session, goals  remain in progress.      Patient is requiring contact guard assist and minimal assist as a result of the following impairments: decreased functional strength, decreased endurance/aerobic capacity, and pain.     Patient continues to function below baseline with bed mobility, transfers, and gait.  Next session anticipate patient to progress bed mobility, transfers, and gait.  Physical Therapy will continue to follow patient for duration of hospitalization.    Patient continues to benefit from continued skilled PT services: at discharge to promote prior level of function.  Anticipate patient will return home with OP PT.    PLAN  PT Treatment Plan: Bed mobility;Body mechanics;Endurance;Gait training  Frequency (Obs): Daily    SUBJECTIVE  Pt  reports being ready for PT RX    OBJECTIVE  Precautions: Spine    WEIGHT BEARING RESTRICTION                PAIN ASSESSMENT   Ratin  Location: low back  Management Techniques: Activity promotion;Body mechanics;Repositioning    BALANCE  Static Sitting: Good  Dynamic Sitting: Fair +  Static Standing: Fair  Dynamic Standing: Fair -    ACTIVITY TOLERANCE                          O2 WALK       AM-PAC '6-Clicks' INPATIENT SHORT FORM - BASIC MOBILITY  How much difficulty does the patient currently have...  Patient Difficulty: Turning over in bed (including adjusting bedclothes, sheets and blankets)?: A Little   Patient Difficulty: Sitting down on and standing up from a chair with arms (e.g., wheelchair, bedside commode, etc.): A Little   Patient Difficulty: Moving from lying on back to sitting on the side of the bed?: A Little   How much help from  another person does the patient currently need...   Help from Another: Moving to and from a bed to a chair (including a wheelchair)?: A Little   Help from Another: Need to walk in hospital room?: A Little   Help from Another: Climbing 3-5 steps with a railing?: A Little     AM-PAC Score:  Raw Score: 18   Approx Degree of Impairment: 46.58%   Standardized Score (AM-PAC Scale): 43.63   CMS Modifier (G-Code): CK    FUNCTIONAL ABILITY STATUS  Functional Mobility/Gait Assessment  Gait Assistance: Contact guard assist  Distance (ft): 2 x 50  Assistive Device: Rolling walker  Pattern: Shuffle  Rolling: minimal assist  Supine to Sit: minimal assist  Sit to Supine: minimal assist  Sit to Stand: minimal assist    Skilled Therapy Provided: Pt seen  daily.Min a for bed mobility and transfer.Extra time provided to complete task.EOB sitting balance activity with emphasis on core stabilization.Spinal precautions reviewed;education;all questions and concerns addressed.There ex.Pt  with inc amb distance to 2 x 50 ft with RW and CGA.Provided cuing for gait pattern as well as for postural awareness.Cooperative and motivated.    The patient's Approx Degree of Impairment: 46.58% has been calculated based on documentation in the [facility] '6 clicks' Inpatient Daily Activity Short Form.  Research supports that patients with this level of impairment may benefit from Home with OP PT.  Final disposition will be made by interdisciplinary medical team.    THERAPEUTIC EXERCISES  Lower Extremity Ankle pumps  Glut sets  Quad sets     Position Supine       Patient End of Session: Up in chair;Call light within reach;RN aware of session/findings;All patient questions and concerns addressed    CURRENT GOALS     Patient Goal Patient's self-stated goal is: to go home   Goal #1 Patient is able to demonstrate supine - sit EOB @ level: independent     Goal #1   Current Status Min a   Goal #2 Patient is able to demonstrate transfers Sit to/from Stand at  assistance level: modified independent with walker - rolling     Goal #2  Current Status Min a   Goal #3 Patient is able to ambulate 100 feet with assist device: walker - rolling at assistance level: modified independent   Goal #3   Current Status Pt amb 2 x 50 ft with RW and CGA    Goal #4 Patient will negotiate 12 stairs/one curb w/ assistive device and supervision   Goal #4   Current Status Nt   Goal #5 Patient to demonstrate independence with home activity/exercise instructions provided to patient in preparation for discharge.   Goal #5   Current Status In progress   Goal #6    Goal #6  Current Status      Gait Training: 15 minutes  Therapeutic Activity: 15   minutes  Neuromuscular Re-education:  minutes  Therapeutic Exercise:    minutes  Canalith Repositioning:  minutes  Manual Therapy:  minutes  Can add/delete any of these

## 2024-09-13 NOTE — PAYOR COMM NOTE
--------------  ADMISSION REVIEW     Payor: [employer] [facility] [employer]  Subscriber #:  [id number]  Authorization Number: [id number]    Admit date: 24  Admit time:  5:44 AM       REVIEW DOCUMENTATION:  ED Provider Notes    No notes of this type exist for this encounter.          H&P       CHIEF COMPLAINT: Scheduled for surgery-here for medical clearance.    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 Patient has attempted multiple conservative and less invasive  treatment modalities in the past without success.  He is currently scheduled for L3-L4/L4-L5 and L5-S1 laminectomy with complete  facetectomies at L4-L5 and L5-S1 and transforaminal lumbar interbody fusion.    ______________________________________  ACTIVE PROBLEMS: Computerized Problem List is the source for the followin. Migraine 24 EAN CEDENO  2. Anxiety 24 EAN CEDENO  3. Gastroesophageal Reflux Disease (SCT [id number]) 24 EAN CEDENO  4. Chronic pain 24 EAN CEDENO  5. Irritable bowel syndrome 24 EAN CEDENO  6. Vitamin D deficiency 24 EAN CEDENO  7. Hyperlipidemia 24 EAN CEDENO  8. Lumbar radiculopathy 24       Gen:Denies fatigue, No fevers, No abnormal weight changes  HEENT: no changes in vision and hearing  Resp: Denies cough, Denies Shortness of Breath  Cardiac: Denies Chest Pain, Denies palpitations,  Neuro: Denies headaches, Denies  syncope.  Persistent and worsening left lower extremity pain radiating into left hip left  thigh and down to the left heel.  Psych: Denies depression, denies anxiety, denies SI.  Gastro: Denies Nausea, Denies Vomiting, Denies diarrhea, denies Constipation,  Denies abdominal pain  : denies dysuria and frequency      Date Vital Measurement Qualifiers  2024 08:48 Temp F (C) 98.3 (36.8)  \" \" Pulse 87  \" \" Respir 12  \" \" /88  \" \" Wt lbs (kg)[BMI] 188  (85.28)[25] Actual  \" \" Pain 8  \" \" POx (L/Min)(%) 99    PHYSICAL EXAM:  GENERAL: No acute distress  CHEST: Clear breath sounds bilaterally. No wheezes, rales, or rhonchi.  CARDIAC: Regular rate and rhythm.  VASCULAR: No Edema. Peripheral pulses normal and equal in all extremities.  ABDOMEN: Normal and soft with no tenderness,  MUSCULOSKELETAL: Walks with a stiff gait. No motor weakness. No bladder or  bowel issues.  NEUROLOGICAL: Alert and oriented x 3. No focal sensory or strength deficits.  Speech normal. Follows commands appropriately.  PSYCHIATRIC: Normal affect, judgement and mood.  SKIN: Normal appearance with no rashes or lesions.    _____________________________________________________________________    Assessment and Plan:    Lumbar radiculopathy  Awaiting surgery  Impression:mri ls spine    1. Degenerative changes as described.    2. Central disc herniation at L3-L4 and broad-based disc  protrusion at L4-L5.    3. Moderate to severe central spinal stenosis and bilateral  foraminal stenosis at L3-L4 and mild central spinal stenosis and  bilateral foraminal stenosis at L4-L5.  Currently on Norco for pain  We will get EKG and labs today.  Patient is a healthy adult and is medically stable to undergo lumbar spinal  surgery as detailed above.  wants a refill on pain meds now as it is due in 1 week.surgery scheduled in 2  weeks.    Hyperlipidemia  diet  -Exercise    HDL 30      9/9 Operative report          OPERATIVE REPORT     PATIENT NAME: Louis Raymundo II     DATE OF OPERATION:  9/9/2024     PREOPERATIVE DIAGNOSIS:  1. Lumbar spondylosis with radiculopathy                                                             2. Lumbosacral spondylosis with radiculopathy     POSTOPERATIVE DIAGNOSIS: 1. same                                                               2. same     OPERATIVE PROCEDURE:  1.  Lumbar 4 through 5 and lumbar 5 through sacral 1 transforaminal intervertebral arthrodesis and fusion,  utilizing 2 titanium interbodies/cages and allograft and locally-harvested morselized autograft  2.  Lumbar 4 through 5 and lumbar 5 through sacral 1 hemilaminectomy and left complete facetectomy and left posterolateral arthrodesis, utilizing locally-harvested morselized autograft  3. Lumbar 3 through 4 laminectomy and left medial facetectomy and foraminotomy and discectomy  3.  Lumbar 4 through S1 pedicle screw and katharina instrumentation for augmentation of fusion purposes.  4.  Real time intraoperative fluoroscopy and C-arm with the image guidance.  5.  Real time intraoperative motor-evoked potentials, SSEP and nerve monitoring.     CPT CODES: 49103, 39526, 82407-04, 84440, 77930-31-51, 05025, 22853x2, 17719, 90635, 69544-09     SURGEON:  Shawn Cardona M.D.     ASSISTANT: Isaias Smith PA-C     ANESTHESIA: General endotracheal anesthesia with TIVA and local anesthetic.     ESTIMATED BLOOD LOSS: 25 cc     INTRAVENOUS FLUIDS AND URINE OUTPUT: Per anesthesia sheet     TRANSFUSION: None     IMPLANTS: Nuva     DRAIN: None     SPECIMEN: none     COMPLICATIONS: none     PROCEDURE:    After informed consent was obtained, the patient was brought to the operating room.  After the uneventful induction of general endotracheal anesthesia and placement of the Ramsey catheter, electrodes and monitoring devices were placed. The patient was then positioned on the operating room table, an open-frame Conner Table, in the prone position. The arms were supported and the neck physiologically extended. All pressure points were adequately padded. The patient's eyes were protected from exposure to prolonged pressure. Baseline electrophysiological potentials were obtained. Subsequently, we utilized lateral and AP fluoroscopic guidance to identify our incision sites relative to the lumbar bony anatomy corresponding to the correct level(s). We identified the incision sites for our tubular retractor placement as well as the percutaneous screws,  contralaterally. They were marked out on the skin.  The patient was prepped and draped sterilely.  The C-arm was also draped sterilely into the field. Time-Out was done in the proper fashion. Perioperative antibiosis was given within one hour of incision.      After the \"Time-Out\", we infiltrated the incisions with our usual local anesthetic. We incised utilizing a 10-blade scalpel. The subcutaneous tissues were opened with Bovie cautery down to the fascia.  The fascia was opened sharply using both sharp and blunt dissection. We then used sequential dilators to place a tubular retractor at the L4-5 level along the left laminofacet junction. This was done with fluoroscopic guidance. The tubular retractor was then connected to a table-mounted arm for added stability.      The soft tissues overlying the laminofacet junction were resected utilizing mono- and bipolar electrocautery. We then used a high-speed yesika the create an ipsilateral laminectomy, undercutting the lamina contralaterally in order to accomplish a generous central decompression. The medial ispilateral facet was then drilled in order to decompress the lateral recess.  A high-speed yesika was then utilized to complete the generous laminectomy and the left L4 pars was then transected perpendicularly. This detached the L4 articulating facet en-bloc. We then drilled down the corresponding L5 articulating surface to expose the L4-5 intervertebral space and disc. The rest of the bony decompression was accomplished utilizing Kerrison rongeurs. After the soft tissues, including the ligamentum flavum, had been resected, we visualized the ipsilaterally exiting L4 and the en-passage L5 nerve roots verifying adequate decompression. The disc space was then re-identified and some epidural veins around the disc space were coagulated and cut sharply with the microscissors.  The disc space was incised and a radical discectomy was performed using a series of Aida and  Kerrison rongeurs, extending across the midline with angled curettes.  The cartilaginous endplates were then removed using a series of curettes, rongeurs, rasps, and celia, removing the cartilaginous endplates and roughing up the bony endplates to get good bleeding bone along both surfaces for fusion purposes.  This was inspected with direct visualization and fluoroscopy repeatedly.  The wound was copiously irrigated. The nerves were maintained safe and protected with continued direct visualization. No additional disc or cartilaginous endplate was encountered and very good bleeding bony endplates were seen. We trialed for the properly-sized intervertebral cage and then filled it with allograft. About 9 cc of Osteocel Plus with some of the drilled autologous bone were impacted along the ventral aspect of the disc space and also to the right side of the disc space utilizing a funnel applicator and graft delivery system. While gently and carefully retracting the en-passage nerve root medially, the cage was impacted into position and 25 % expanded to proper alignment. Approximately, 5 degrees of segmental lordosis were gained.  Fluoroscopy confirmed good positioning of the cage. We irrigated the surgical site copiously and hemostasis was obtained with bipolar electrocautery and Flowseal. We then decorticated the ipsilateral posterolateral elements generously extending from L4 to L5 with the high-speed yesika. The rest of our harvested autograft which had been denuded off all soft tissue and morselized was then packed into the posterolateral space and gutter extending from L4 through L5 for posterolateral arthrodesis purposes. The tubular retractor was then slowly removed, verifying hemostasis at every point of the way meticulously with bipolar electrocautery.     We then performed the same sequence of events between L5 and S1 with a decompression, complete left facetectomy, L5-S1 complete discectomy and intervertebral  arthrodesis, utilizing an expandable cage with allograft and locally-harvested morselized autograft.     We then redialated at the L3-4 level coming in from the left and placed and secured the tubular retractor there. The soft tissues overlying the left L3-4 laminofacet junction was resected utilizing monopolar and bipolar electrocautery. We then used the yesika to resect the left lamina and the medial facet. The ligamentum flavum was resected and the lateral recess exposed. We visualized the en-passage L4 nerve root on the left and then performed a minimal microdiscetomy at this level, following the nerve to its foramen. We irrigated the surgical site copiously and hemostasis was obtained with bipolar electrocautery and Flowseal.  The tubular retractor was then slowly removed, verifying hemostasis at every point of the way meticulously with bipolar electrocautery.       Next, after the corresponding incisions were opened with a 10-blade, a monitored Jamshidi needle was used to cannulate the pedicles from L4 to L5 to S1 in the traditional transpedicular fashion. We verified electrophysiological readings at good thresholds. The trajectories were fluoroscopically confirmed. Subsequently, the screws with their extenders were placed over guidewires utilizing fluoroscopic guidance. Finally, we tested and stimulated all screws electrophysiologically at appropriate thresholds.       At this point in the procedure we proceeded to finish the instrumentation. In cases where a segmental spondylolisthesis was present, we asked our anesthesia colleagues now to provide pharmacological paralysis so that the listhesis would be easier to reduce.  Utilizing calipers within the screw extenders, we measured for the properly-sized lordotic rods and placed them appropriately. These were placed through the guide channels of the extenders and easily placed into the screw heads.  Locking nuts were provisionally placed.  Then, using the torque   and the counter-torque device, each end of the katharina was secured into the tulip heads of the screws, compressing or reducing the construct lightly before tightening the rods.  The locking nuts were again revisited with the torque  to confirm tightness.  The screw extenders were then removed and final fluoroscopic images documented satisfactory position of the cage, screws, and rods.  The wound was copiously irrigated and small bleeding points were controlled with a bipolar electrocautery.      The construct was again inspected and found to be quite satisfactory under direct visualization.  Still, more irrigation was used and the lumbodorsal fascia was closed using a series of interrupted 0 Vicryl sutures.  The subcutaneous tissues were copiously irrigated and closed with an interrupted inverted 3-0 Vicryl suture. The skin was closed with 4-0 Vicryl and Dermabond.     There were no complications.  All counts were reported correct. The nerve stimulation of the screws achieved satisfactory thresholds including with the final placement of the instrumentation.  The patient was returned to the supine position, extubated in the operating room, and taken to the recovery room in satisfactory condition, having tolerated the procedure well and moving all fours strongly to command.       9/9 IM consult      REPORT OF CONSULTATION      REASON FOR ADMISSION:  Post lumbar interbody fusion.     HISTORY OF PRESENT ILLNESS:  Patient is a 49-year-old  male with chronic back pain and radiculopathy.  MRI scan of the lumbar spine showed L3-L4 disc herniation and L4-L5 bilateral foraminal stenosis, failed outpatient conservative medical management options.  Scheduled today for above-mentioned procedure by his spine neurosurgeon, Dr. Shawn Cardona.  Postoperatively, transferred to PACU for further monitoring.      PAST MEDICAL HISTORY:  Degenerative joint disease of lumbar spine, anxiety, depression, migraines, and  gastroesophageal reflux disease.     PAST SURGICAL HISTORY:  Ruptured appendicitis requiring appendectomy and partial colectomy.  He had laparoscopic cholecystectomy.   REVIEW OF SYSTEMS:  Currently resting in bed.  Lower back discomfort.  No lower extremity tingling or numbness.  No chest pain.  No shortness of breath.  Other 12-point review of systems is negative.         PHYSICAL EXAMINATION:    GENERAL:  Alert and oriented to time, place and person.  No acute distress.   VITAL SIGNS:  Temperature 98.6, pulse 84, respiratory rate 12, blood pressure 125/70, pulse ox 98% on 3L nasal cannula oxygen.  HEENT:  Atraumatic.  Oropharynx clear.  Moist mucous membranes.  Ears and nose normal.  Eyes:  Anicteric sclerae.   NECK:  Supple.  No lymphadenopathy.  Trachea midline.  Full range of motion.   LUNGS:  Clear to auscultation bilaterally.  Normal respiratory effort.    HEART:  Regular rate and rhythm.  S1 and S2 auscultated.  No murmur.    ABDOMEN:  Soft, nondistended.  No tenderness.  Positive bowel sounds.   EXTREMITIES:  No peripheral edema, clubbing or cyanosis.   NEUROLOGIC:  Lumbar dressing noted.  Motor and sensory intact.       ASSESSMENT AND PLAN:  Lumbar radiculopathy, status post L3-L4, L4-L5, and L5-S1 laminectomy; left L4-L5 and L5-S1 complete facetectomy and transforaminal lumbar interbody fusion.  Pain control.  Neuro checks.  DVT prophylaxis.  Physical and occupational therapy.         9/10 Surgery      Subjective:  Louis Raymundo II is a(n) 49 year old male.  Alert, orientated x3.  Cooperative. Post-op pain      Vital Signs:    Temp:  [96.9 °F (36.1 °C)-100 °F (37.8 °C)] 100 °F (37.8 °C)  Pulse:  [] 103  Resp:  [10-22] 18  BP: (110-155)/(53-91) 122/72  SpO2:  [93 %-100 %] 95 %     I/O:  I/O last 3 completed shifts:  In: 1765 [P.O.:15; I.V.:1750]  Out: 2805 [Urine:2775; Blood:30]       Labs:        Lab Results   Component Value Date     WBC 6.8 04/23/2024     HGB 17.7 (H) 04/23/2024     .0  04/23/2024     BUN 9 04/23/2024      04/23/2024     K 4.8 04/23/2024     CO2 28.0 04/23/2024     GLU 98 04/23/2024     ALB 5.0 (H) 04/23/2024     PTT 29.3 09/06/2024     INR 0.96 09/06/2024         Neurological Exam:  AAOx3, following commands  PERRLA  EOMI  MAEx4  Sensation symmetrical  Incisions c/d/I     Abdomen:  Soft, non-distended, non-tender, with no rebound or guarding.  No peritoneal signs.   Extremities:  Non-tender, no lower extremity edema noted.        Imaging:  XR FLUOROSCOPY C-ARM TIME LESS THAN 1 HOUR (CPT=76000)     Result Date: 9/9/2024  CONCLUSION:        Intraoperative fluoroscopy provided.  Please see surgical note for specific details.     Dictated by (CST): Robert Schilling MD on 9/09/2024 at 12:26 PM     Finalized by (CST): Robert Schilling MD on 9/09/2024 at 12:26 PM            Assessment/Plan:  Principal Problem:    Lumbar disc herniation with radiculopathy  Active Problems:    S/P lumbar fusion     POD#1 s/p L3-S1 decompression, L4-S1 TLIF, doing well with post-op pain.   Mobilize with PTOT.  C/e pain control  Dispo pending.    9/10       Chief Complaint: Back pain      Subjective:   Louis Raymundo II is still having uncontrolled pain. He feels his L side is having spasms radiating down his L leg. No F/C no N/V. No CP or SOB         Objective:      Objective:  Blood pressure 124/69, pulse 86, temperature 99.6 °F (37.6 °C), temperature source Oral, resp. rate 18, height 6' (1.829 m), weight 190 lb (86.2 kg), SpO2 95%.     Physical Exam:    General: No acute distress.   Respiratory: Clear to auscultation bilaterally. No wheezes. No rhonchi.  Cardiovascular: S1, S2. Regular rate and rhythm. No murmurs, rubs or gallops.   Abdomen: Soft, nontender, nondistended.  Positive bowel sounds. No rebound or guarding.  Neurologic: No focal neurological deficits.   Musculoskeletal: Moves all extremities.  Extremities: No edema.        Results:         Results:  Labs:      Recent Labs   Lab  09/06/24  1011   INR 0.96         No results for input(s): \"GLU\", \"BUN\", \"CREATSERUM\", \"GFRAA\", \"GFRNAA\", \"CA\", \"ALB\", \"NA\", \"K\", \"CL\", \"CO2\", \"ALKPHO\", \"AST\", \"ALT\", \"BILT\", \"TP\" in the last 168 hours.     CrCl cannot be calculated (Patient's most recent lab result is older than the maximum 7 days allowed.).         Recent Labs   Lab 09/06/24  1011   PTP 13.4   INR 0.96            Culture:  No results found for this visit on 09/09/24.     Cardiac  No results for input(s): \"TROP\", \"PBNP\" in the last 168 hours.        Imaging: Imaging data reviewed in Epic.  XR FLUOROSCOPY C-ARM TIME LESS THAN 1 HOUR (CPT=76000)     Result Date: 9/9/2024  CONCLUSION: Intraoperative fluoroscopy provided.  Please see surgical note for specific details.     Dictated by (CST): Robert Schilling MD on 9/09/2024 at 12:26 PM     Finalized by (CST): Robert Schilling MD on 9/09/2024 at 12:26 PM            Medications:   Scheduled Medications    sennosides  17.2 mg Oral Nightly    docusate sodium  100 mg Oral BID    pantoprazole  40 mg Oral QAM AC    pregabalin  200 mg Oral BID    sertraline  50 mg Oral QAM               Assessment and Plan:         Assessment & Plan:  Lumbar radiculopathy   Neurosurgery on consult.   S/p L3-S1 decompression, L4-S1 TLIF.   Pain control - switch to percocet PRN. Cont IV dilauddi for severe pain. Cont lyrica. Add robaxin 500mg TID.   PT/OT   SW for discharge planning.   Other medical problems  GERD  Anxiety/depression          9/11 IM    Objective:  Blood pressure 151/73, pulse 73, temperature 97 °F (36.1 °C), temperature source Oral, resp. rate 16, height 6' (1.829 m), weight 190 lb (86.2 kg), SpO2 97%.     Physical Exam:    General: No acute distress.   Respiratory: Clear to auscultation bilaterally. No wheezes. No rhonchi.  Cardiovascular: S1, S2. Regular rate and rhythm. No murmurs, rubs or gallops.   Abdomen: Soft, nontender, nondistended.  Positive bowel sounds. No rebound or guarding.  Neurologic: No  focal neurological deficits.   Musculoskeletal: Moves all extremities.  Extremities: No edema.        Results:         Results:  Labs:       Recent Labs   Lab 09/06/24  1011 09/11/24  0414   WBC  --  12.0*   HGB  --  14.1   MCV  --  90.3   PLT  --  224.0   INR 0.96  --              Recent Labs   Lab 09/11/24  0414   *   BUN 9   CREATSERUM 0.93   CA 9.4      K 4.9      CO2 31.0         Estimated Creatinine Clearance: 105.5 mL/min (based on SCr of 0.93 mg/dL).         Recent Labs   Lab 09/06/24  1011   PTP 13.4            Assessment and Plan:         Assessment & Plan:  Lumbar radiculopathy   Neurosurgery on consult.   S/p L3-S1 decompression, L4-S1 TLIF.   Pain control - switch to percocet 10 g q 4 hoursPRN. Cont IV  0.2 mg dilaudid for severe pain. Cont lyrica. Add robaxin 500mg TID.   Add oxycodone ER BID   Percocet ordered             9/12 IM    Chief Complaint: Back pain      Subjective:   Louis T Kostuch II still requiring IV dilaudid. Wanting it every 2 hours. Patient is slurring his words a little. Wants tizanidine         Objective:      Objective:  Blood pressure 123/75, pulse 96, temperature 99.8 °F (37.7 °C), temperature source Axillary, resp. rate 18, height 6' (1.829 m), weight 190 lb (86.2 kg), SpO2 94%.     Physical Exam:    General: No acute distress.   Respiratory: Clear to auscultation bilaterally. No wheezes. No rhonchi.  Cardiovascular: S1, S2. Regular rate and rhythm. No murmurs, rubs or gallops.   Abdomen: Soft, nontender, nondistended.  Positive bowel sounds. No rebound or guarding.  Neurologic: No focal neurological deficits.   Musculoskeletal: Moves all extremities.  Extremities: No edema.        Results:         Results:  Labs:        Recent Labs   Lab 09/06/24  1011 09/11/24 0414 09/12/24 0435   WBC  --  12.0* 11.7*   HGB  --  14.1 13.9   MCV  --  90.3 87.1   PLT  --  224.0 239.0   INR 0.96  --   --               Recent Labs   Lab 09/11/24  0414 09/12/24  0435   GLU  114* 103*   BUN 9 8*   CREATSERUM 0.93 0.79   CA 9.4 9.7    139   K 4.9 4.7    104   CO2 31.0 30.0         Estimated Creatinine Clearance: 124.1 mL/min (based on SCr of 0.79 mg/dL).         Recent Labs   Lab 09/06/24  1011   PTP 13.4   INR 0.96            Culture:  No results found for this visit on 09/09/24.     Cardiac  No results for input(s): \"TROP\", \"PBNP\" in the last 168 hours.        Imaging: Imaging data reviewed in Epic.  No results found.     Medications:   Scheduled Medications    oxyCODONE ER  10 mg Oral 2 times per day    sennosides  17.2 mg Oral Nightly    docusate sodium  100 mg Oral BID    pantoprazole  40 mg Oral QAM AC    pregabalin  200 mg Oral BID    sertraline  50 mg Oral QAM           PRN Medications     HYDROmorphone    tiZANidine    oxyCODONE-acetaminophen **OR** oxyCODONE-acetaminophen    polyethylene glycol (PEG 3350)    magnesium hydroxide    bisacodyl    fleet enema    ondansetron    prochlorperazine    diphenhydrAMINE **OR** diphenhydrAMINE    benzocaine-menthol    dicyclomine    melatonin        Assessment and Plan:       Assessment & Plan:  Lumbar radiculopathy   Neurosurgery on consult.   S/p L3-S1 decompression, L4-S1 TLIF.   Pain control - switch to percocet 10 g q 4 hoursPRN. Cont IV  0.2 mg dilaudid for severe pain. Cont lyrica. Change robaxin to tizanidine.    Added oxycodone ER BID   Percocet ordered   PT/OT   SW for discharge planning.   Wean IV dilaudid        MEDICATIONS ADMINISTERED IN LAST 1 DAY:  docusate sodium (Colace) cap 100 mg       Date Action Dose Route User    9/12/2024 2016 Given 100 mg Oral Myla Young RN    9/12/2024 0841 Given 100 mg Oral Moreno, Inessa          HYDROmorphone (Dilaudid) 1 MG/ML injection 0.4 mg       Date Action Dose Route User    9/12/2024 0851 Given 0.4 mg Intravenous Mroeno, Inessa          HYDROmorphone (Dilaudid) 1 MG/ML injection 0.8 mg       Date Action Dose Route User    9/12/2024 1110 Given 0.8 mg Intravenous Josh,  Inessa          methocarbamol (Robaxin) tab 500 mg       Date Action Dose Route User    9/12/2024 0841 Given 500 mg Oral Moreno, Inessa          oxyCODONE ER (OxyCONTIN ER) 12 hr tab 10 mg       Date Action Dose Route User    9/13/2024 0516 Given 10 mg Oral Myla Young RN    9/12/2024 1707 Given 10 mg Oral Moreno, Inessa          oxyCODONE-acetaminophen (Percocet) 5-325 MG per tab 2 tablet       Date Action Dose Route User    9/13/2024 0516 Given 2 tablet Oral Myla Young RN    9/13/2024 0109 Given 2 tablet Oral Miranda Key RN    9/12/2024 2016 Given 2 tablet Oral YoungMyla maldonado RN    9/12/2024 1422 Given 2 tablet Oral Moreno, Inessa    9/12/2024 0841 Given 2 tablet Oral Moreno, Inessa          pantoprazole (Protonix) DR tab 40 mg       Date Action Dose Route User    9/13/2024 0516 Given 40 mg Oral Myla Young RN          sennosides (Senokot) tab 17.2 mg       Date Action Dose Route User    9/12/2024 2016 Given 17.2 mg Oral YoungMyla maldonado RN          sertraline (Zoloft) tab 50 mg       Date Action Dose Route User    9/12/2024 0841 Given 50 mg Oral Moreno, Inessa          tiZANidine (Zanaflex) tab 2 mg       Date Action Dose Route User    9/13/2024 0109 Given 2 mg Oral Miranda Key RN    9/12/2024 1707 Given 2 mg Oral Moreno, Inessa          pregabalin (Lyrica) cap 200 mg       Date Action Dose Route User    9/12/2024 2016 Given 200 mg Oral Myla Young RN    9/12/2024 0840 Given 200 mg Oral Moreno, Inessa                          ceFAZolin (Ancef) 2g in 10mL IV syringe premix  Dose: 2 g  Freq: Every 8 hours Route: IV  Last Dose: 2 g (09/09/24 8268)  Start: 09/09/24 1600 End: 09/09/24 2330   Order specific questions:            1745 SK-New Bag     2325 ZN-New Bag            ceFAZolin (Ancef) 2g in 10mL IV syringe premix  Dose: 2 g  Freq: Once Route: IV  Start: 09/09/24 0715 End: 09/09/24 0752   Admin Instructions:   Re-dose if surgery lasts longer than 4 hours   Order specific questions:             0752 MC-Given                        Medications 09/04 09/05 09/06 09/07 09/08 09/09 09/10 09/11 09/12 09/13   lactated ringers infusion  Rate: 100 mL/hr  Freq: Continuous Route: IV  Start: 09/09/24 1145 End: 09/09/24 1342         1145 AT-Restarted     1342-D/C'd          lactated ringers infusion  Rate: 20 mL/hr  Freq: Continuous Route: IV  Start: 09/09/24 0600 End: 09/10/24 1254   Admin Instructions:   TKO  If patient has renal failure, use 0.9% NaCl infusion.                     HYDROmorphone (Dilaudid) 1 MG/ML injection 0.2 mg  Dose: 0.2 mg  Freq: Every 5 min PRN Route: IV  PRN Reason: severe pain  PRN Comment: for pain score 1-3 up to a maximum of 3 mg  Start: 09/09/24 1131 End: 09/09/24 1342   Admin Instructions:   If no relief after reaching maximum dose (3 mg), proceed to Morphine administration         1301 AT-Given     1342-D/C'd           HYDROmorphone (Dilaudid) 1 MG/ML injection 0.4 mg  Dose: 0.4 mg  Freq: Every 2 hour PRN Route: IV  PRN Reason: moderate pain  Start: 09/09/24 1343 End: 09/12/24 1443   Admin Instructions:   Use PRN reason as a guide and follow range order policy. If oral pain meds are ordered and patient can tolerate oral intake, start with PRN oral pain medications first.         1407 SK-Given                                    0810 SK-Given                               (0532 AM)-Not Given [C]                                    0851 YT-Given          1443-D/C'd               0621 NT-New Bag     0735 MC-Continued by Anesthesia     0738 MC-Paused [C]     0739 MC-New Bag     0836 MC-Anesthesia Volume Adjustment     0955 MC-Anesthesia Volume Adjustment      1254-D/C'd             ondansetron (Zofran) 4 MG/2ML injection 4 mg  Dose: 4 mg  Freq: Every 6 hours PRN Route: IV  PRN Reasons: Nausea,vomiting  Start: 09/09/24 1343   Admin Instructions:   Default antiemetic sequence (unless otherwise preferred by patient):  1. ondansetron (Zofran)  2. prochlorperazine (Compazine). Wait 15 minutes  before proceeding to next medication in sequence.  Follow therapeutic duplication policy.          0928 SK-Given              Vitals (last day)       Date/Time Temp Pulse Resp BP SpO2 Weight O2 Device O2 Flow Rate (L/min) [facility]    09/13/24 0512 99 °F (37.2 °C) 85 18 127/76 96 % -- None (Room air) -- MM    09/12/24 2025 100.3 °F (37.9 °C) -- -- -- -- -- -- -- MM    09/12/24 2014 -- 93 18 133/77 94 % -- None (Room air) -- MM    09/12/24 1628 -- 92 18 136/83 100 % -- None (Room air) --     09/12/24 0848 99.8 °F (37.7 °C) -- -- -- -- -- -- -- YT    09/12/24 0805 -- 96 18 123/75 94 % -- None (Room air) --     09/12/24 0300 100.4 °F (38 °C) 89 18 128/83 95 % -- None (Room air) -- ZN                     09/11/24 1214 97 °F (36.1 °C) 73 16 151/73 97 % -- None (Room air) -- SK   09/11/24 0749 -- 95 18 127/82 97 % -- None (Room air) --    09/11/24 0444 100 °F (37.8 °C) 87 18 149/86 97 % -- None (Room air) -- ER       09/10/24 1937 99.8 °F (37.7 °C) 93 18 120/66 96 % -- None (Room air) -- KT   09/10/24 1554 100.9 °F (38.3 °C) Abnormal  90 18 133/74 95 % -- None (Room air) -- SK   09/10/24 1215 99.6 °F (37.6 °C) 86 18 124/69 95 % -- None (Room air) -- SK   09/10/24 0934 99.7 °F (37.6 °C) 79 -- 116/67 95 % -- None (Room air) -- SK       09/09/24 1228 -- 91 10 110/53 99 % -- Nasal cannula 3 L/min AT   09/09/24 1218 -- 97 20 120/65 98 % -- Nasal cannula 3 L/min AT   09/09/24 1208 -- 101 11 134/86 98 % -- Nasal cannula 3 L/min AT   09/09/24 1158 -- 98 16 140/91 Abnormal  97 % -- Nasal cannula 3 L/min AT   09/09/24 1148 -- 99 13 141/85 97 % -- Nasal cannula 3 L/min AT   09/09/24 1138 98.6 °F (37 °C) -- -- 129/72 -- -- Nasal cannula 3 L/min AT   09/09/24 1138 -- 103 16 -- 98 % -- -- --    09/09/24 0618 98.3 °F (36.8 °C) 86 16 143/80 99 % 190 lb (86.2 kg) None (Room air) -- NT

## 2024-09-13 NOTE — DISCHARGE SUMMARY
[facility]  part of [facility]    Discharge Summary    Louis Raymundo II Patient Status:  Inpatient    1975 MRN [id number]   Location [facility] 4W/SW/SE Attending Sarah Claros MD   Hosp Day # 4 PCP Carleeyadira Vermama     Date of Admission: 2024   Date of Discharge: 2024    Hospital Discharge Diagnoses: Acute DJD    Lace+ Score: 46  59-90 High Risk  29-58 Medium Risk  0-28   Low Risk.    TCM Follow-Up Recommendation:  LACE > 58: High Risk of readmission after discharge from the hospital.        Admitting Diagnosis: DDD (degenerative disc disease), lumbosacral [M51.37]  Degenerative disc disease, lumbar [M51.36]  Other spondylosis with radiculopathy, lumbar region [M47.26]  S/P lumbar fusion    Disposition: Home    Discharge Diagnosis: .Principal Problem:    Lumbar disc herniation with radiculopathy  Active Problems:    S/P lumbar fusion      Hospital Course:   Reason for Admission:   Per Dr. Garcia  Patient is a 49-year-old  male with chronic back pain and radiculopathy. MRI scan of the lumbar spine showed L3-L4 disc herniation and L4-L5 bilateral foraminal stenosis, failed outpatient conservative medical management options. Scheduled today for above-mentioned procedure by his spine neurosurgeon, Dr. Shawn Cardona. Postoperatively, transferred to PACU for further monitoring.     Discharge Physical Exam:   Physical Exam:    General: No acute distress.   Respiratory: Clear to auscultation bilaterally. No wheezes. No rhonchi.  Cardiovascular: S1, S2. Regular rate and rhythm. No murmurs, rubs or gallops.   Abdomen: Soft, nontender, nondistended.  Positive bowel sounds. No rebound or guarding.  Neurologic: No focal neurological deficits.   Musculoskeletal: Moves all extremities.    Hospital Course:   Lumbar radiculopathy   Neurosurgery on consult.   S/p L3-S1 decompression, L4-S1 TLIF.   Pain control - switch to percocet 10 g q 4 hoursPRN. Cont IV  0.2 mg dilaudid for  severe pain. Cont lyrica. Change robaxin to tizanidine.    Discharge home today  Pain meds per neurosurgery   Other medical problems  GERD  Anxiety/depression         Complications: none    Consultants         Provider   Role Specialty     Gibran Garcia MD      Consulting Physician HOSPITALIST     Ye Cerda MD      Consulting Physician Internal Medicine          Surgical Procedures       Case IDs Date Procedure Surgeon Location Status    [id number] 9/9/24 L3-4, L4-5, L5-S1 laminectomy.  Left L4-5 and L5-S1 complete facetectomies and transforaminal lumbar interbody fusion Shawn Cardona MD [facility] MAIN OR Comp              Discharge Plan:   Discharge Condition: Stable    Current Discharge Medication List        New Orders    Details   oxyCODONE 10 MG Oral Tab Take 1 tablet (10 mg total) by mouth every 6 (six) hours as needed for Pain.      acetaminophen 500 MG Oral Tab Take 1 tablet (500 mg total) by mouth every 4 (four) hours as needed for Pain.      Naloxone HCl 4 MG/0.1ML Nasal Liquid 4 mg by Nasal route as needed. If patient remains unresponsive, repeat dose in other nostril 2-5 minutes after first dose.           Home Meds - Modified    Details   cyclobenzaprine 10 MG Oral Tab Take 1 tablet (10 mg total) by mouth 3 (three) times daily as needed for Muscle spasms.           Home Meds - Unchanged    Details   Rizatriptan Benzoate 10 MG Oral Tab Take 1 tablet (10 mg total) by mouth as needed for Migraine.      sertraline 50 MG Oral Tab Take 1 tablet (50 mg total) by mouth every morning.      Tadalafil (CIALIS) 20 MG Oral Tab Take 1 tablet (20 mg total) by mouth as needed for Erectile Dysfunction.      pregabalin 200 MG Oral Cap Take 1 capsule (200 mg total) by mouth 2 (two) times daily.      dicyclomine 10 MG Oral Cap Take 1 capsule (10 mg total) by mouth 4 (four) times daily before meals and nightly.      pantoprazole 40 MG Oral Tab EC Take 1 tablet (40 mg total) by mouth every morning before breakfast.                  Discharge Diet: As tolerated    Discharge Activity: As tolerated       Discharge Medications        START taking these medications        Instructions Prescription details   acetaminophen 500 MG Tabs  Commonly known as: Tylenol Extra Strength      Take 1 tablet (500 mg total) by mouth every 4 (four) hours as needed for Pain.   Stop taking on: September 29, 2024  Quantity: 120 tablet  Refills: 0     cyclobenzaprine 10 MG Tabs  Commonly known as: Flexeril      Take 1 tablet (10 mg total) by mouth 3 (three) times daily as needed for Muscle spasms.   Quantity: 60 tablet  Refills: 0     Naloxone HCl 4 MG/0.1ML Liqd      4 mg by Nasal route as needed. If patient remains unresponsive, repeat dose in other nostril 2-5 minutes after first dose.   Quantity: 1 kit  Refills: 0     oxyCODONE 10 MG Tabs      Take 1 tablet (10 mg total) by mouth every 6 (six) hours as needed for Pain.   Stop taking on: September 16, 2024  Quantity: 28 tablet  Refills: 0            CONTINUE taking these medications        Instructions Prescription details   dicyclomine 10 MG Caps  Commonly known as: Bentyl      Take 1 capsule (10 mg total) by mouth 4 (four) times daily before meals and nightly.   Quantity: 120 capsule  Refills: 1     pantoprazole 40 MG Tbec  Commonly known as: Protonix      Take 1 tablet (40 mg total) by mouth every morning before breakfast.   Quantity: 30 tablet  Refills: 2     pregabalin 200 MG Caps  Commonly known as: LYRICA      Take 1 capsule (200 mg total) by mouth 2 (two) times daily.   Quantity: 60 capsule  Refills: 0     Rizatriptan Benzoate 10 MG Tabs  Commonly known as: MAXALT      Take 1 tablet (10 mg total) by mouth as needed for Migraine.   Quantity: 10 tablet  Refills: 1     sertraline 50 MG Tabs  Commonly known as: Zoloft      Take 1 tablet (50 mg total) by mouth every morning.   Refills: 0     Tadalafil 20 MG Tabs  Commonly known as: Cialis      Take 1 tablet (20 mg total) by mouth as needed for Erectile  Dysfunction.   Quantity: 24 tablet  Refills: 1            STOP taking these medications      HYDROcodone-acetaminophen  MG Tabs  Commonly known as: Norco                  Where to Get Your Medications        These medications [id number] - [place], [place] - [phone], [phone]  [address], [facility] [address]      Phone: [phone]   acetaminophen 500 MG Tabs  cyclobenzaprine 10 MG Tabs  Naloxone HCl 4 MG/0.1ML Liqd  oxyCODONE 10 MG Tabs         Follow up:      Follow-up Information       Shawn Cardona MD Follow up in 2 week(s).    Specialty: NEUROSURGERY  Contact information:  [address] [facility] [address]  [facility] [id number] [phone]               Carlee Ray Follow up in 1 week(s).    Specialty: Internal Medicine  Contact information:  [address] [facility]  [address]  [facility] [address] [phone]                             Follow up Labs and imaging:         Time spent:  > 30 minutes    LIVIA MONTANO MD  9/13/2024

## 2024-09-13 NOTE — PLAN OF CARE
Problem: Patient Centered Care  Goal: Patient preferences are identified and integrated in the patient's plan of care  Description: Interventions:  - What would you like us to know as we care for you? ***  - Provide timely, complete, and accurate information to patient/family  - Incorporate patient and family knowledge, values, beliefs, and cultural backgrounds into the planning and delivery of care  - Encourage patient/family to participate in care and decision-making at the level they choose  - Honor patient and family perspectives and choices  Outcome: Completed     Problem: Patient/Family Goals  Goal: Patient/Family Long Term Goal  Description: Patient's Long Term Goal: ***    Interventions:  - ***  - See additional Care Plan goals for specific interventions  Outcome: Completed  Goal: Patient/Family Short Term Goal  Description: Patient's Short Term Goal: ***    Interventions:   - ***  - See additional Care Plan goals for specific interventions  Outcome: Completed     Problem: PAIN - ADULT  Goal: Verbalizes/displays adequate comfort level or patient's stated pain goal  Description: INTERVENTIONS:  - Encourage pt to monitor pain and request assistance  - Assess pain using appropriate pain scale  - Administer analgesics based on type and severity of pain and evaluate response  - Implement non-pharmacological measures as appropriate and evaluate response  - Consider cultural and social influences on pain and pain management  - Manage/alleviate anxiety  - Utilize distraction and/or relaxation techniques  - Monitor for opioid side effects  - Notify MD/LIP if interventions unsuccessful or patient reports new pain  - Anticipate increased pain with activity and pre-medicate as appropriate  Outcome: Completed     Problem: SAFETY ADULT - FALL  Goal: Free from fall injury  Description: INTERVENTIONS:  - Assess pt frequently for physical needs  - Identify cognitive and physical deficits and behaviors that affect risk of  falls.  - Jonancy fall precautions as indicated by assessment.  - Educate pt/family on patient safety including physical limitations  - Instruct pt to call for assistance with activity based on assessment  - Modify environment to reduce risk of injury  - Provide assistive devices as appropriate  - Consider OT/PT consult to assist with strengthening/mobility  - Encourage toileting schedule  Outcome: Completed     Problem: DISCHARGE PLANNING  Goal: Discharge to home or other facility with appropriate resources  Description: INTERVENTIONS:  - Identify barriers to discharge w/pt and caregiver  - Include patient/family/discharge partner in discharge planning  - Arrange for needed discharge resources and transportation as appropriate  - Identify discharge learning needs (meds, wound care, etc)  - Arrange for interpreters to assist at discharge as needed  - Consider post-discharge preferences of patient/family/discharge partner  - Complete POLST form as appropriate  - Assess patient's ability to be responsible for managing their own health  - Refer to Case Management Department for coordinating discharge planning if the patient needs post-hospital services based on physician/LIP order or complex needs related to functional status, cognitive ability or social support system  Outcome: Completed     Problem: GASTROINTESTINAL - ADULT  Goal: Minimal or absence of nausea and vomiting  Description: INTERVENTIONS:  - Maintain adequate hydration with IV or PO as ordered and tolerated  - Nasogastric tube to low intermittent suction as ordered  - Evaluate effectiveness of ordered antiemetic medications  - Provide nonpharmacologic comfort measures as appropriate  - Advance diet as tolerated, if ordered  - Obtain nutritional consult as needed  - Evaluate fluid balance  Outcome: Completed     Problem: METABOLIC/FLUID AND ELECTROLYTES - ADULT  Goal: Electrolytes maintained within normal limits  Description: INTERVENTIONS:  - Monitor  labs and rhythm and assess patient for signs and symptoms of electrolyte imbalances  - Administer electrolyte replacement as ordered  - Monitor response to electrolyte replacements, including rhythm and repeat lab results as appropriate  - Fluid restriction as ordered  - Instruct patient on fluid and nutrition restrictions as appropriate  Outcome: Completed     Problem: SKIN/TISSUE INTEGRITY - ADULT  Goal: Skin integrity remains intact  Description: INTERVENTIONS  - Assess and document risk factors for pressure ulcer development  - Assess and document skin integrity  - Monitor for areas of redness and/or skin breakdown  - Initiate interventions, skin care algorithm/standards of care as needed  Outcome: Completed  Goal: Incision(s), wounds(s) or drain site(s) healing without S/S of infection  Description: INTERVENTIONS:  - Assess and document risk factors for pressure ulcer development  - Assess and document skin integrity  - Assess and document dressing/incision, wound bed, drain sites and surrounding tissue  - Implement wound care per orders  - Initiate isolation precautions as appropriate  - Initiate Pressure Ulcer prevention bundle as indicated  Outcome: Completed

## 2024-09-13 NOTE — CM/SW NOTE
09/13/24 1200   Discharge disposition   Expected discharge disposition Home or Self   Outpatient services Outpatient rehab services   Discharge transportation Private car     Per chart, pt has DC order for today.    Message sent to RN and MD requesting MD enter order/Rx for Outpatient PT as appropriate.      Pt is cleared from SW/CM stand point.      PLAN: Home w/ Outpatient PT           MITESH Gallo, LSW [id number]

## 2024-09-24 ENCOUNTER — OFFICE VISIT (OUTPATIENT)
Dept: SURGERY | Facility: CLINIC | Age: 49
End: 2024-09-24
Payer: OTHER GOVERNMENT

## 2024-09-24 VITALS
HEIGHT: 73 IN | WEIGHT: 190 LBS | HEART RATE: 87 BPM | SYSTOLIC BLOOD PRESSURE: 114 MMHG | BODY MASS INDEX: 25.18 KG/M2 | DIASTOLIC BLOOD PRESSURE: 84 MMHG

## 2024-09-24 DIAGNOSIS — Z98.1 STATUS POST LUMBAR SPINAL FUSION: Primary | ICD-10-CM

## 2024-09-24 PROCEDURE — 99024 POSTOP FOLLOW-UP VISIT: CPT

## 2024-09-24 NOTE — PROGRESS NOTES
[facility]        [facility]      [address]  [address]  [place] [address]    PHONE  [phone]          FAX  [phone]    [email or web address]      OFFICE FOLLOW-UP NOTE            Louis Raymundo II    : 1975    MRN: [id number]  CSN: [id number]      PCP: Carlee Ray  Referring Provider: No ref. provider found    Insurance: Payor: [facility] / Plan: [facility] [employer]UM / Product Type: *No Product type* /           Date of Visit: 2024    Reason for Visit:   Chief Complaint    Post-Op                         History of Present Care:  Louis Raymundo II is a a(n) 49 year old, male is now 2 weeks status post L3-4, L4-5, L5-S1 laminectomy and L4-S1 TLIF. Patient doing well at this point in time endorsing expected postoperative low back pain. He does have some tailbone discomfort which she states is new since surgery.  He is utilizing pain medication as needed for the pain.  He denies any radiating lower extremity pain or bowel or bladder changes.      History:  .  Past Medical History:    Anxiety state    Back problem    degenerative disc disease    Depression    Esophageal reflux    History of blood transfusion    with emergency appy, no reaction    Migraines    Osteoarthritis    Visual impairment    readers      Past Surgical History:   Procedure Laterality Date    Appendectomy      Colectomy      Colonoscopy  2021    Laparoscopic cholecystectomy      Other      peritonitis r/t ruptured appendix -  week later 9 inches colon removed    Other surgical history Bilateral     Sesamoid feet      Family History   Problem Relation Age of Onset    Other (Other) Father         brain trauma    Cancer Mother         breast      Social History     Socioeconomic History    Marital status:      Spouse name: Not on file    Number of children: Not on file    Years of education: Not on file    Highest  education level: Not on file   Occupational History    Not on file   Tobacco Use    Smoking status: Never    Smokeless tobacco: Never   Vaping Use    Vaping status: Never Used   Substance and Sexual Activity    Alcohol use: Never    Drug use: Not Currently    Sexual activity: Not on file   Other Topics Concern    Caffeine Concern Yes    Exercise Not Asked    Seat Belt Not Asked    Special Diet No    Stress Concern Not Asked    Weight Concern Not Asked   Social History Narrative    Not on file     Social Determinants of Health     Financial Resource Strain: Not on file   Food Insecurity: No Food Insecurity (9/9/2024)    Food Insecurity     Food Insecurity: Never true   Transportation Needs: No Transportation Needs (9/9/2024)    Transportation Needs     Lack of Transportation: No     Car Seat: Not on file   Physical Activity: Not on file   Stress: Not on file   Social Connections: Not on file   Housing Stability: Low Risk  (9/9/2024)    Housing Stability     Housing Instability: No     Housing Instability Emergency: Not on file     Crib or Bassinette: Not on file        Allergies:  Allergies   Allergen Reactions    Mold OTHER (SEE COMMENTS)     Note: congestion    Other OTHER (SEE COMMENTS)     Note: congestion    Pollen OTHER (SEE COMMENTS)     Note: congestion    Seasonal OTHER (SEE COMMENTS)     Note: congestion    Watermelon OTHER (SEE COMMENTS)     Note: itchy and congestion    Theophylline ITCHING and OTHER (SEE COMMENTS)         Medications:  Current Outpatient Medications   Medication Sig Dispense Refill    cyclobenzaprine 10 MG Oral Tab Take 1 tablet (10 mg total) by mouth 3 (three) times daily as needed for Muscle spasms. 60 tablet 0    acetaminophen 500 MG Oral Tab Take 1 tablet (500 mg total) by mouth every 4 (four) hours as needed for Pain. 120 tablet 0    Rizatriptan Benzoate 10 MG Oral Tab Take 1 tablet (10 mg total) by mouth as needed for Migraine. 10 tablet 1    sertraline 50 MG Oral Tab Take 1 tablet  (50 mg total) by mouth every morning.      Tadalafil (CIALIS) 20 MG Oral Tab Take 1 tablet (20 mg total) by mouth as needed for Erectile Dysfunction. 24 tablet 1    pregabalin 200 MG Oral Cap Take 1 capsule (200 mg total) by mouth 2 (two) times daily. 60 capsule 0    dicyclomine 10 MG Oral Cap Take 1 capsule (10 mg total) by mouth 4 (four) times daily before meals and nightly. 120 capsule 1    pantoprazole 40 MG Oral Tab EC Take 1 tablet (40 mg total) by mouth every morning before breakfast. 30 tablet 2        Review of Systems:  A 10-point system was reviewed.  Pertinent positives and negatives are noted in HPI.      Physical Exam:  /84 (BP Location: Right arm, Patient Position: Sitting, Cuff Size: adult)   Pulse 87   Ht 73\"   Wt 190 lb (86.2 kg)   BMI 25.07 kg/m²         Neurological Exam:    AAOx3, following commands  PERRLA  EOMI  Face symmetrical  Tongue midline  Hearing symmetrical and intact to finger rub    No rhinorrhea or otorrhea    Romberg negative    Motor Strength:  5 out of 5 bilateral lower extremities    Sensation to light touch:  Intact in bilateral lower extremities    Incision:  Clean, dry, intact      Abdomen:  Soft, non-distended, non-tender, with no rebound or guarding.  No peritoneal signs.     Extremities:  Non-tender, no lower extremity edema noted.      Labs:  CBC:  Lab Results   Component Value Date    WBC 10.8 09/13/2024    HGB 14.1 09/13/2024    HCT 41.3 09/13/2024    MCV 88.2 09/13/2024    .0 09/13/2024      BMG:  Lab Results   Component Value Date     09/13/2024    K 4.7 09/13/2024    CO2 32.0 09/13/2024     09/13/2024    BUN 10 09/13/2024      INR:  Lab Results   Component Value Date    INR 0.96 09/06/2024    INR 1.00 04/23/2024          Diagnostics:  No new images    Diagnosis:  1. Status post lumbar spinal fusion      Assessment/Plan:  Louis Raymundo II returns office 2 weeks status post multilevel laminectomy and lumbar interbody fusion.  Patient  doing expected postoperatively with low back discomfort.  He is utilizing pain medications as needed.  He is set to initiate physical therapy today.  He will follow-up in the office in 4 weeks.      More than 30 minutes were spent during this visit and the coordination of this patient's care. All questions and concerns were addressed. We appreciate the opportunity to participate in the care of this patient. Please do not hesitate to call our office ([phone]) with any issues.    This note has been dictated utilizing voice recognition software. Unfortunately, this may lead to occasional typographical errors. If there are any questions regarding this, please do not hesitate to contact our office.           Tab Smith PA-C    9/24/2024  1:04 PM

## 2024-09-24 NOTE — PATIENT INSTRUCTIONS
Refill policies:    Allow 2-3 business days for refills; controlled substances may take longer.  Contact your pharmacy at least 5 days prior to running out of medication and have them send an electronic request or submit request through the “request refill” option in your [employer] account.  Refills are not addressed on weekends; covering physicians do not authorize routine medications on weekends.  No narcotics or controlled substances are refilled after noon on Fridays or by on call physicians.  By law, narcotics must be electronically prescribed.  A 30 day supply with no refills is the maximum allowed.  If your prescription is due for a refill, you may be due for a follow up appointment.  To best provide you care, patients receiving routine medications need to be seen at least once a year.  Patients receiving narcotic/controlled substance medications need to be seen at least once every 3 months.  In the event that your preferred pharmacy does not have the requested medication in stock (e.g. Backordered), it is your responsibility to find another pharmacy that has the requested medication available.  We will gladly send a new prescription to that pharmacy at your request.    Scheduling Tests:    If your physician has ordered radiology tests such as MRI or CT scans, please contact Central Scheduling at [phone] right away to schedule the test.  Once scheduled, the [facility] Centralized Referral Team will work with your insurance carrier to obtain pre-certification or prior authorization.  Depending on your insurance carrier, approval may take 3-10 days.  It is highly recommended patients assure they have received an authorization before having a test performed.  If test is done without insurance authorization, patient may be responsible for the entire amount billed.      Precertification and Prior Authorizations:  If your physician has recommended that you have a procedure or additional testing performed the [facility]  Centralized Referral Team will contact your insurance carrier to obtain pre-certification or prior authorization.    You are strongly encouraged to contact your insurance carrier to verify that your procedure/test has been approved and is a COVERED benefit.  Although the [facility] Centralized Referral Team does its due diligence, the insurance carrier gives the disclaimer that \"Although the procedure is authorized, this does not guarantee payment.\"    Ultimately the patient is responsible for payment.   Thank you for your understanding in this matter.  Paperwork Completion:  If you require FMLA or disability paperwork for your recovery, please make sure to either drop it off or have it faxed to our office at [phone]. Be sure the form has your name and date of birth on it.  The form will be faxed to our Forms Department and they will complete it for you.  There is a 25$ fee for all forms that need to be filled out.  Please be aware there is a 10-14 day turnaround time.  You will need to sign a release of information (PATTI) form if your paperwork does not come with one.  You may call the Forms Department with any questions at [phone].  Their fax number is [phone].

## 2024-09-25 ENCOUNTER — PATIENT MESSAGE (OUTPATIENT)
Facility: CLINIC | Age: 49
End: 2024-09-25

## 2024-09-25 DIAGNOSIS — Z98.1 STATUS POST LUMBAR SPINAL FUSION: Primary | ICD-10-CM

## 2024-09-26 NOTE — TELEPHONE ENCOUNTER
From: Louis Raymundo II  To: Shawn Cardona  Sent: 9/25/2024 11:59 AM CDT  Subject: Movement restrictions     ,    Man! I feel so much better now than before you repaired my back!    I’m 15 days post surgery. What movement restrictions if any do I need to follow?    I saw Isaias yesterday and forgot to mention some popping noises, the same as when someone cracks their back. Is this expected?    I’m doing a lot of short walks throughout my neighborhood. Almost pain free. The exception is every now and again it feels like my tailbone gets tasered.    Thank you,    Louis

## 2024-09-27 NOTE — TELEPHONE ENCOUNTER
Patient called, all questions answered to satisfaction. Tailbone pain improving. New LLE numbness. Likely post op nerve irritation. Is going to resume Testosterone therapy. Has stubbed his left foot on the stairs when going up recently. Patient states he can toe balance. Able to heel balance strength is equal he states, although more difficulty with heel than toe balance.     Advised if any new, worsening or concerning signs/symptoms to call, follow up sooner or go to the ED/UC    Patient agreed to the plan, verbalized understanding and was appreciative.

## 2024-09-27 NOTE — TELEPHONE ENCOUNTER
Mychart sent to pt informing provider is currently out of the office.    We will let him know once we have inpt from provider

## 2024-09-30 RX ORDER — OXYCODONE HYDROCHLORIDE 10 MG/1
10 TABLET ORAL EVERY 8 HOURS PRN
Qty: 15 TABLET | Refills: 0 | Status: SHIPPED | OUTPATIENT
Start: 2024-09-30

## 2024-09-30 NOTE — TELEPHONE ENCOUNTER
Medication: oxycodone 10 mg Q 6 #28    Date of last refill: 9-9-24  [place]     Last office visit: 9-24-24  Due back to clinic per last office note:  6 week post op  Date next office visit scheduled:  10-22-24      Last OV note recommendation:   Assessment/Plan:  Louis Raymundo II returns office 2 weeks status post multilevel laminectomy and lumbar interbody fusion.  Patient doing expected postoperatively with low back discomfort.  He is utilizing pain medications as needed.  He is set to initiate physical therapy today.  He will follow-up in the office in 4 weeks.

## 2024-10-22 ENCOUNTER — OFFICE VISIT (OUTPATIENT)
Dept: SURGERY | Facility: CLINIC | Age: 49
End: 2024-10-22
Payer: OTHER GOVERNMENT

## 2024-10-22 VITALS
SYSTOLIC BLOOD PRESSURE: 128 MMHG | WEIGHT: 190 LBS | DIASTOLIC BLOOD PRESSURE: 87 MMHG | HEART RATE: 86 BPM | HEIGHT: 73 IN | OXYGEN SATURATION: 97 % | BODY MASS INDEX: 25.18 KG/M2

## 2024-10-22 DIAGNOSIS — Z98.1 STATUS POST LUMBAR SPINAL FUSION: Primary | ICD-10-CM

## 2024-10-22 PROCEDURE — 99024 POSTOP FOLLOW-UP VISIT: CPT

## 2024-10-22 RX ORDER — HYDROCODONE BITARTRATE AND ACETAMINOPHEN 10; 325 MG/1; MG/1
1 TABLET ORAL EVERY 6 HOURS PRN
Qty: 28 TABLET | Refills: 0 | Status: SHIPPED | OUTPATIENT
Start: 2024-10-22 | End: 2024-11-01

## 2024-10-22 RX ORDER — HYDROCODONE BITARTRATE AND ACETAMINOPHEN 10; 325 MG/1; MG/1
TABLET ORAL
COMMUNITY
Start: 2024-09-30

## 2024-10-22 RX ORDER — SILDENAFIL 100 MG/1
50 TABLET, FILM COATED ORAL
COMMUNITY
Start: 2024-09-30

## 2024-10-22 NOTE — PROGRESS NOTES
[facility] Neurosurgery        [facility]      [address]  [address]  [place] [address]    PHONE  [phone]          FAX  [phone]    [email or web address]      OFFICE FOLLOW-UP NOTE            Louis Raymundo II    : 1975    MRN: [id number]  CSN: [id number]      PCP: Carlee Ray  Referring Provider: No ref. provider found    Insurance: Payor: [employer] / Plan: [facility] OPTUM / Product Type: *No Product type* /           Date of Visit: 10/22/2024    Reason for Visit:   Chief Complaint    Post-Op                         History of Present Care:  Louis Raymundo II is a a(n) 49 year old, male presents to the office 6 weeks status post L3-S1 laminectomy and L4-S1 transforaminal lumbar interbody fusion.  Patient reports minimal low back pain or radiating pain down the lower extremities.  Patient does have numbness to the top of the left foot and the 1st through 4th digits.  The first digit has been chronically numb from a previous surgery.  Patient reports improved pain.  Patient also endorsing pain at the base of the skull intermittently.  This was not present prior to or for the first 4 weeks following surgery.  Patient states it feels like a jolt of pain which is severe.  He does endorse he had a recent sickness where he was coughing up large amounts of phlegm.  He denies fever, night sweats, chills.      History:  .  Past Medical History:    Anxiety state    Back problem    degenerative disc disease    Depression    Esophageal reflux    History of blood transfusion    with emergency appy, no reaction    Migraines    Osteoarthritis    Visual impairment    readers      Past Surgical History:   Procedure Laterality Date    Appendectomy  1992    Colectomy      Colonoscopy  2021    Laparoscopic cholecystectomy      Other      peritonitis r/t ruptured appendix -  week later 9 inches colon removed    Other surgical  history Bilateral     Sesamoid feet      Family History   Problem Relation Age of Onset    Other (Other) Father         brain trauma    Cancer Mother         breast      Social History     Socioeconomic History    Marital status:      Spouse name: Not on file    Number of children: Not on file    Years of education: Not on file    Highest education level: Not on file   Occupational History    Not on file   Tobacco Use    Smoking status: Never    Smokeless tobacco: Never   Vaping Use    Vaping status: Never Used   Substance and Sexual Activity    Alcohol use: Never    Drug use: Not Currently    Sexual activity: Not on file   Other Topics Concern    Caffeine Concern Yes    Exercise Not Asked    Seat Belt Not Asked    Special Diet No    Stress Concern Not Asked    Weight Concern Not Asked   Social History Narrative    Not on file     Social Drivers of Health     Financial Resource Strain: Not on file   Food Insecurity: No Food Insecurity (9/9/2024)    Food Insecurity     Food Insecurity: Never true   Transportation Needs: No Transportation Needs (9/9/2024)    Transportation Needs     Lack of Transportation: No     Car Seat: Not on file   Physical Activity: Not on file   Stress: Not on file   Social Connections: Not on file   Housing Stability: Low Risk  (9/9/2024)    Housing Stability     Housing Instability: No     Housing Instability Emergency: Not on file     Crib or Bassinette: Not on file        Allergies:  Allergies[1]      Medications:  Current Outpatient Medications   Medication Sig Dispense Refill    HYDROcodone-acetaminophen  MG Oral Tab       Sildenafil Citrate 100 MG Oral Tab Take 0.5 tablets (50 mg total) by mouth.      HYDROcodone-acetaminophen  MG Oral Tab Take 1 tablet by mouth every 6 (six) hours as needed for Pain. 28 tablet 0    Rizatriptan Benzoate 10 MG Oral Tab Take 1 tablet (10 mg total) by mouth as needed for Migraine. 10 tablet 1    cyclobenzaprine 10 MG Oral Tab Take 1  tablet (10 mg total) by mouth 3 (three) times daily as needed for Muscle spasms. 60 tablet 0    sertraline 50 MG Oral Tab Take 1 tablet (50 mg total) by mouth every morning.      pregabalin 200 MG Oral Cap Take 1 capsule (200 mg total) by mouth 2 (two) times daily. 60 capsule 0    oxyCODONE 10 MG Oral Tab Take 1 tablet (10 mg total) by mouth every 8 (eight) hours as needed for Pain. (Patient not taking: Reported on 10/22/2024) 15 tablet 0        Review of Systems:  A 10-point system was reviewed.  Pertinent positives and negatives are noted in HPI.      Physical Exam:  /87 (BP Location: Right arm)   Pulse 86   Ht 73\"   Wt 190 lb (86.2 kg)   SpO2 97%   BMI 25.07 kg/m²         Neurological Exam:    AAOx3, following commands  PERRLA  EOMI  Face symmetrical  Tongue midline  Hearing symmetrical and intact to finger rub    No rhinorrhea or otorrhea    Romberg negative    Kernig sign negative  Brudzinski sign negative    Motor Strength:  5-5 in bilateral lower extremities    Sensation to light touch:  Left lower extremity top of the foot numbness to light touch    Incision:  Clean, dry, intact      Abdomen:  Soft, non-distended, non-tender, with no rebound or guarding.  No peritoneal signs.     Extremities:  Non-tender, no lower extremity edema noted.      Labs:  CBC:  Lab Results   Component Value Date    WBC 10.8 09/13/2024    HGB 14.1 09/13/2024    HCT 41.3 09/13/2024    MCV 88.2 09/13/2024    .0 09/13/2024      BMG:  Lab Results   Component Value Date     09/13/2024    K 4.7 09/13/2024    CO2 32.0 09/13/2024     09/13/2024    BUN 10 09/13/2024      INR:  Lab Results   Component Value Date    INR 0.96 09/06/2024    INR 1.00 04/23/2024          Diagnostics:  No new images to review    Diagnosis:  1. Status post lumbar spinal fusion  - HYDROcodone-acetaminophen  MG Oral Tab; Take 1 tablet by mouth every 6 (six) hours as needed for Pain.  Dispense: 28 tablet; Refill: 0  - PHYSICAL  THERAPY EXTERNAL  - XR CERVICAL SPINE (2-3 VIEWS) (CPT=72040); Future      Assessment/Plan:  Louis Raymundo II returns office 6 weeks postoperatively.  Patient reports minimal low back pain and improvement of his radiating left lower extremity pain.  He does have some postoperative numbness in the left foot.  He has been utilizing Norco 10 mg for his pain.  I have refilled patient's pain medication to be utilized every 6 hours as needed for moderate pain.  Of also provided patient with an additional order for physical therapy and an x-ray of the lumbar spine to be completed in 6 weeks and he will follow-up at that time.    In regard to patient's neck pain, I have asked [facility].  Patient reports no upper extremity pain, numbness, tingling, weakness.  It is unlikely lumbar intervention would correspond to this new onset neck pain.  Patient did have a recent sickness and there is concern for meningitis.  Special testing Kernig and Brudzinski were negative.  Patient understanding and will schedule an appointment with his PCP.      More than 30 minutes were spent during this visit and the coordination of this patient's care. All questions and concerns were addressed. We appreciate the opportunity to participate in the care of this patient. Please do not hesitate to call our office ([phone]) with any issues.    This note has been dictated utilizing voice recognition software. Unfortunately, this may lead to occasional typographical errors. If there are any questions regarding this, please do not hesitate to contact our office.           Tab Smith PA-C    10/22/2024  1:04 PM         [1]   Allergies  Allergen Reactions    Mold OTHER (SEE COMMENTS)     Note: congestion    Other OTHER (SEE COMMENTS)     Note: congestion    Pollen OTHER (SEE COMMENTS)     Note: congestion    Seasonal OTHER (SEE COMMENTS)     Note: congestion    Tree Nuts UNKNOWN     Watermelon OTHER (SEE COMMENTS)     Note: itchy and congestion    Theophylline ITCHING and OTHER (SEE COMMENTS)

## 2024-11-01 ENCOUNTER — TELEPHONE (OUTPATIENT)
Dept: SURGERY | Facility: CLINIC | Age: 49
End: 2024-11-01

## 2024-11-01 DIAGNOSIS — Z98.1 STATUS POST LUMBAR SPINAL FUSION: Primary | ICD-10-CM

## 2024-11-01 NOTE — TELEPHONE ENCOUNTER
Manny at [facility] is requesting to have order for physical therapy and protocol after surgery sent to them at [phone]. Please advise

## 2024-11-04 NOTE — TELEPHONE ENCOUNTER
We have the PT order to send. Do you have any type of protocol you would like them to follow after surgery?

## 2024-11-05 NOTE — TELEPHONE ENCOUNTER
Printed PT order and faxed to [facility] at number provided. Confirmation received.

## 2024-11-06 ENCOUNTER — PATIENT MESSAGE (OUTPATIENT)
Dept: SURGERY | Facility: CLINIC | Age: 49
End: 2024-11-06

## 2024-11-06 DIAGNOSIS — Z98.1 STATUS POST LUMBAR SPINAL FUSION: ICD-10-CM

## 2024-11-06 RX ORDER — OXYCODONE HYDROCHLORIDE 10 MG/1
10 TABLET ORAL EVERY 6 HOURS PRN
Qty: 28 TABLET | Refills: 0 | Status: SHIPPED | OUTPATIENT
Start: 2024-11-06 | End: 2024-11-13

## 2024-11-06 NOTE — TELEPHONE ENCOUNTER
Message below noted.    Pt requesting if he can get a Percocet prescription as he no longer gets relief from Norco .    Surgery 9/09/24 L3-4, L4-5, L5-S1 laminectomy.  Left L4-5 and L5-S1 complete facetectomies and transforaminal lumbar interbody fusion.    LOV 10/22/24  \"Assessment/Plan:  Louis Raymnudo II returns office 6 weeks postoperatively.  Patient reports minimal low back pain and improvement of his radiating left lower extremity pain.  He does have some postoperative numbness in the left foot.  He has been utilizing Norco 10 mg for his pain.  I have refilled patient's pain medication to be utilized every 6 hours as needed for moderate pain.  Of also provided patient with an additional order for physical therapy and an x-ray of the lumbar spine to be completed in 6 weeks and he will follow-up at that time.     In regard to patient's neck pain, I have asked him to [facility].  Patient reports no upper extremity pain, numbness, tingling, weakness.  It is unlikely lumbar intervention would correspond to this new onset neck pain.  Patient did have a recent sickness and there is concern for meningitis.  Special testing Kernig and Brudzinski were negative.  Patient understanding and will schedule an appointment with his PCP.\"    Routed to Provider.

## 2024-11-07 ENCOUNTER — TELEPHONE (OUTPATIENT)
Dept: SURGERY | Facility: CLINIC | Age: 49
End: 2024-11-07

## 2024-11-07 ENCOUNTER — MED REC SCAN ONLY (OUTPATIENT)
Dept: SURGERY | Facility: CLINIC | Age: 49
End: 2024-11-07

## 2024-11-07 NOTE — TELEPHONE ENCOUNTER
[facility] Physical Therapy   Date of Visit 11-   Certification Period 11- - 12/12/2024  Dandre Tracy PT  Left on Dr. Brendan hein for review and signature.

## 2024-11-18 DIAGNOSIS — Z98.1 STATUS POST LUMBAR SPINAL FUSION: Primary | ICD-10-CM

## 2024-11-18 RX ORDER — OXYCODONE HYDROCHLORIDE 10 MG/1
10 TABLET ORAL EVERY 6 HOURS PRN
Qty: 28 TABLET | Refills: 0 | Status: SHIPPED | OUTPATIENT
Start: 2024-11-18 | End: 2024-11-25

## 2024-11-18 NOTE — TELEPHONE ENCOUNTER
Patient calling to request refill of oxyCODONE 10 MG Oral Tab   Pharmacy   [id number] - [place], [place] - [address] [phone], [phone]   [address] [facility] [address]   Phone: [phone] Fax: [phone]       Patient informed of 48 hour refill policy excluding weekends and holidays.   Informed patient prescription is sent directly to pharmacy.    Further explained patient will not receive a call back once prescription is ready.

## 2024-11-18 NOTE — TELEPHONE ENCOUNTER
Refill request received.    Medication: oxyCODONE 10 MG Oral Tab      Date of last refill: 11/06/24 (#28/0)  [place]      Last office visit: 10/22/24  Due back to clinic per last office note:  6 weeks  Date next office visit scheduled:    Future Appointments   Date Time Provider Department Center   12/10/2024  8:00 AM Shawn Cardona MD [facility]           Last OV note recommendation:    \"Assessment/Plan:  Louis Raymundo ESTEE returns office 6 weeks postoperatively.  Patient reports minimal low back pain and improvement of his radiating left lower extremity pain.  He does have some postoperative numbness in the left foot.  He has been utilizing Norco 10 mg for his pain.  I have refilled patient's pain medication to be utilized every 6 hours as needed for moderate pain.  Of also provided patient with an additional order for physical therapy and an x-ray of the lumbar spine to be completed in 6 weeks and he will follow-up at that time.     In regard to patient's neck pain, I have [facility].  Patient reports no upper extremity pain, numbness, tingling, weakness.  It is unlikely lumbar intervention would correspond to this new onset neck pain.  Patient did have a recent sickness and there is concern for meningitis.  Special testing Jeanneg and Brudzinski were negative.  Patient understanding and will schedule an appointment with his PCP.\"    Routed to Provider.

## 2024-11-27 RX ORDER — OXYCODONE HYDROCHLORIDE 10 MG/1
10 TABLET ORAL EVERY 4 HOURS PRN
Qty: 28 TABLET | Refills: 0 | OUTPATIENT
Start: 2024-11-27

## 2024-11-27 NOTE — TELEPHONE ENCOUNTER
Refill request received.    Medication: oxyCODONE 10 MG Oral Tab      Date of last refill: 11/18/24 (#28/0)  [place]      Last office visit: 10/22/24  Due back to clinic per last office note:  6 weeks  Date next office visit scheduled:    Future Appointments   Date Time Provider Department Center   12/10/2024  8:00 AM Shawn Cardona MD [facility]           Last OV note recommendation:    \"Assessment/Plan:  Louis Raymundo ESTEE returns office 6 weeks postoperatively.  Patient reports minimal low back pain and improvement of his radiating left lower extremity pain.  He does have some postoperative numbness in the left foot.  He has been utilizing Norco 10 mg for his pain.  I have refilled patient's pain medication to be utilized every 6 hours as needed for moderate pain.  Of also provided patient with an additional order for physical therapy and an x-ray of the lumbar spine to be completed in 6 weeks and he will follow-up at that time.     In regard to patient's neck pain, I have asked him to [facility].  Patient reports no upper extremity pain, numbness, tingling, weakness.  It is unlikely lumbar intervention would correspond to this new onset neck pain.  Patient did have a recent sickness and there is concern for meningitis.  Special testing Jeanneg and Brudzinski were negative.  Patient understanding and will schedule an appointment with his PCP.\"    Routed to Provider.

## 2024-12-04 DIAGNOSIS — Z98.1 STATUS POST LUMBAR SPINAL FUSION: Primary | ICD-10-CM

## 2024-12-04 RX ORDER — HYDROCODONE BITARTRATE AND ACETAMINOPHEN 5; 325 MG/1; MG/1
1 TABLET ORAL EVERY 6 HOURS PRN
Qty: 28 TABLET | Refills: 0 | Status: SHIPPED | OUTPATIENT
Start: 2024-12-04 | End: 2024-12-10

## 2024-12-04 RX ORDER — OXYCODONE HYDROCHLORIDE 10 MG/1
10 TABLET ORAL EVERY 4 HOURS PRN
Qty: 28 TABLET | Refills: 0 | Status: CANCELLED | OUTPATIENT
Start: 2024-12-04

## 2024-12-04 NOTE — TELEPHONE ENCOUNTER
Refill request received.    Medication: oxyCODONE 10 MG Oral Tab      Date of last refill: 11/18/24 (#28/0)  [place]      Last office visit: 10/22/24  Due back to clinic per last office note:  6 weeks  Date next office visit scheduled:    Future Appointments   Date Time Provider Department Center   12/10/2024  8:00 AM Shawn Cardona MD [facility]           Last OV note recommendation:    \"Assessment/Plan:  Louis Raymundo ESTEE returns office 6 weeks postoperatively.  Patient reports minimal low back pain and improvement of his radiating left lower extremity pain.  He does have some postoperative numbness in the left foot.  He has been utilizing Norco 10 mg for his pain.  I have refilled patient's pain medication to be utilized every 6 hours as needed for moderate pain.  Of also provided patient with an additional order for physical therapy and an x-ray of the lumbar spine to be completed in 6 weeks and he will follow-up at that time.     In regard to patient's neck pain, I have asked him to [facility].  Patient reports no upper extremity pain, numbness, tingling, weakness.  It is unlikely lumbar intervention would correspond to this new onset neck pain.  Patient did have a recent sickness and there is concern for meningitis.  Special testing Jeanneg and Brudzinski were negative.  Patient understanding and will schedule an appointment with his PCP.\"    Routed to Provider.

## 2024-12-04 NOTE — TELEPHONE ENCOUNTER
Patient calling to request refill of Oxycodone.  Pharmacy [id number] - [place] [address] [address] [phone], [phone] [[id number]]     Patient informed of 48 hour refill policy excluding weekends and holidays.   Informed patient prescription is sent directly to pharmacy.    Further explained patient will not receive a call back once prescription is ready.      Patient states that he is still having  a lot of pain in his left foot. Patient is not able to sleep.

## 2024-12-04 NOTE — TELEPHONE ENCOUNTER
Patient nearly 3 months postoperative spinal fusion.  Will provide patient with a reduced strength pain medication for 7 days.  Further prescriptions will require patient to be evaluated by the pain management team to determine strength and frequency.  Pain management referral provided

## 2024-12-06 NOTE — PATIENT INSTRUCTIONS
Refill policies:    Allow 2-3 business days for refills; controlled substances may take longer.  Contact your pharmacy at least 5 days prior to running out of medication and have them send an electronic request or submit request through the “request refill” option in your [employer] account.  Refills are not addressed on weekends; covering physicians do not authorize routine medications on weekends.  No narcotics or controlled substances are refilled after noon on Fridays or by on call physicians.  By law, narcotics must be electronically prescribed.  A 30 day supply with no refills is the maximum allowed.  If your prescription is due for a refill, you may be due for a follow up appointment.  To best provide you care, patients receiving routine medications need to be seen at least once a year.  Patients receiving narcotic/controlled substance medications need to be seen at least once every 3 months.  In the event that your preferred pharmacy does not have the requested medication in stock (e.g. Backordered), it is your responsibility to find another pharmacy that has the requested medication available.  We will gladly send a new prescription to that pharmacy at your request.    Scheduling Tests:    If your physician has ordered radiology tests such as MRI or CT scans, please contact Central Scheduling at [phone] right away to schedule the test.  Once scheduled, the [facility] Centralized Referral Team will work with your insurance carrier to obtain pre-certification or prior authorization.  Depending on your insurance carrier, approval may take 3-10 days.  It is highly recommended patients assure they have received an authorization before having a test performed.  If test is done without insurance authorization, patient may be responsible for the entire amount billed.      Precertification and Prior Authorizations:  If your physician has recommended that you have a procedure or additional testing performed the [facility]  Centralized Referral Team will contact your insurance carrier to obtain pre-certification or prior authorization.    You are strongly encouraged to contact your insurance carrier to verify that your procedure/test has been approved and is a COVERED benefit.  Although the [facility] [employer] of [facility] Centralized Referral Team does its due diligence, the insurance carrier gives the disclaimer that \"Although the procedure is authorized, this does not guarantee payment.\"    Ultimately the patient is responsible for payment.   Thank you for your understanding in this matter.  Paperwork Completion:  If you require FMLA or disability paperwork for your recovery, please make sure to either drop it off or have it faxed to our office at [phone]. Be sure the form has your name and date of birth on it.  The form will be faxed to our Forms Department and they will complete it for you.  There is a 25$ fee for all forms that need to be filled out.  Please be aware there is a 10-14 day turnaround time.  You will need to sign a release of information (PATTI) form if your paperwork does not come with one.  You may call the Forms Department with any questions at [phone].  Their fax number is [phone].

## 2024-12-10 ENCOUNTER — TELEPHONE (OUTPATIENT)
Dept: SURGERY | Facility: CLINIC | Age: 49
End: 2024-12-10

## 2024-12-10 ENCOUNTER — OFFICE VISIT (OUTPATIENT)
Dept: SURGERY | Facility: CLINIC | Age: 49
End: 2024-12-10
Payer: OTHER GOVERNMENT

## 2024-12-10 ENCOUNTER — HOSPITAL ENCOUNTER (OUTPATIENT)
Dept: GENERAL RADIOLOGY | Facility: HOSPITAL | Age: 49
Discharge: HOME | End: 2024-12-10
Payer: COMMERCIAL

## 2024-12-10 VITALS
DIASTOLIC BLOOD PRESSURE: 91 MMHG | OXYGEN SATURATION: 95 % | HEART RATE: 79 BPM | BODY MASS INDEX: 25.18 KG/M2 | SYSTOLIC BLOOD PRESSURE: 136 MMHG | WEIGHT: 190 LBS | HEIGHT: 73 IN

## 2024-12-10 DIAGNOSIS — Z98.1 STATUS POST LUMBAR SPINAL FUSION: Primary | ICD-10-CM

## 2024-12-10 DIAGNOSIS — Z98.1 STATUS POST LUMBAR SPINAL FUSION: ICD-10-CM

## 2024-12-10 DIAGNOSIS — M54.2 CERVICALGIA: ICD-10-CM

## 2024-12-10 PROCEDURE — 72040 X-RAY EXAM NECK SPINE 2-3 VW: CPT

## 2024-12-10 PROCEDURE — 99214 OFFICE O/P EST MOD 30 MIN: CPT | Performed by: NEUROLOGICAL SURGERY

## 2024-12-10 PROCEDURE — 72100 X-RAY EXAM L-S SPINE 2/3 VWS: CPT

## 2024-12-10 RX ORDER — GABAPENTIN 100 MG/1
200 CAPSULE ORAL 3 TIMES DAILY
Qty: 60 CAPSULE | Refills: 0 | Status: SHIPPED | OUTPATIENT
Start: 2024-12-10 | End: 2024-12-20

## 2024-12-10 RX ORDER — HYDROCODONE BITARTRATE AND ACETAMINOPHEN 5; 325 MG/1; MG/1
1 TABLET ORAL EVERY 6 HOURS PRN
Qty: 28 TABLET | Refills: 0 | Status: SHIPPED | OUTPATIENT
Start: 2024-12-10 | End: 2024-12-17

## 2024-12-10 NOTE — TELEPHONE ENCOUNTER
Patient states Tab Smith was to prescribe Norco 5mg. He states he would like it to be sent to [facility] pharmacy. He states he only has the phone number: [phone]

## 2024-12-10 NOTE — TELEPHONE ENCOUNTER
Refill request received. Patient requesting refill be sent to [facility].    [facility]  [address] [place] [address]  [phone].     Medication: HYDROcodone-acetaminophen 5-325 MG Oral Tab      Date of last refill: 12/04/24 (#28/0)  [place]      Last office visit: 12/10/24  Due back to clinic per last office note:  3 months  Date next office visit scheduled:    Future Appointments   Date Time Provider Department Center   3/11/2025  8:00 AM Shawn Cardona MD [facility]           Last OV note recommendation:    \"Assessment/Plan:  Louis Raymundo II returns to the office today for review 3 months postoperatively.  Patient endorsing left foot numbness, tingling and pain and I would like to initiate him on gabapentin to help with this.  Patient will continue to utilize Norco 5 mg as needed and we discussed weaning this medication over time.  I reviewed an x-ray of the cervical spine which appears within normal limits but due to continued neck pain I will provide him with an physical therapy order to initiate conservative treatment.  I would also like patient to complete an x-ray of the lumbar spine today for us to review as well as complete an additional x-ray of the lumbar spine in 3 months for us to review at his follow-up visit.\"    Routed to Provider.

## 2024-12-10 NOTE — TELEPHONE ENCOUNTER
Message below noted.    Number does not match [employer] address. Advised patient to confirm correct information before we send request to Provider.

## 2024-12-10 NOTE — TELEPHONE ENCOUNTER
Refilling pain medication.    I, Tab Smith PA-C, have reviewed the [place] Prescription Monitoring Program for dispensed medication history.

## 2024-12-10 NOTE — PROGRESS NOTES
[facility] Neurosurgery        Center for [facility]      [address]  [address]  [place] [address]    PHONE  [phone]          FAX  [phone]    [email or web address]      OFFICE FOLLOW-UP NOTE            Louis Raymundo II    : 1975    MRN: [id number]  CSN: [id number]      PCP: Carlee Ray  Referring Provider: No ref. provider found    Insurance: Payor: [facility] / Plan: [facility] OPTUM / Product Type: *No Product type* /           Date of Visit: 12/10/2024    Reason for Visit:   Chief Complaint    Follow - Up                         History of Present Care:  Louis Raymundo II is a a(n) 49 year old, male presents to the office 3 months postoperatively.  Patient with continued left foot numbness, tingling and pain which keeps him up at night.  Overall his low back pain is minimal.  He also endorses cervicalgia and popping of the joints with range of motion.  He has been utilizing Norco 10 mg and I reduced him to Norco 5 mg.  He denies weakness in his extremities.  He has no bowel or bladder changes.      History:  .  Past Medical History:    Anxiety state    Back problem    degenerative disc disease    Depression    Esophageal reflux    History of blood transfusion    with emergency appy, no reaction    Migraines    Osteoarthritis    Visual impairment    readers      Past Surgical History:   Procedure Laterality Date    Appendectomy      Colectomy      Colonoscopy  2021    Laparoscopic cholecystectomy      Other      peritonitis r/t ruptured appendix -  week later 9 inches colon removed    Other surgical history Bilateral     Sesamoid feet      Family History   Problem Relation Age of Onset    Other (Other) Father         brain trauma    Cancer Mother         breast      Social History     Socioeconomic History    Marital status:      Spouse name: Not on file    Number of children: Not on file    Years of  education: Not on file    Highest education level: Not on file   Occupational History    Not on file   Tobacco Use    Smoking status: Never    Smokeless tobacco: Never   Vaping Use    Vaping status: Never Used   Substance and Sexual Activity    Alcohol use: Never    Drug use: Not Currently    Sexual activity: Not on file   Other Topics Concern    Caffeine Concern Yes    Exercise Not Asked    Seat Belt Not Asked    Special Diet No    Stress Concern Not Asked    Weight Concern Not Asked   Social History Narrative    Not on file     Social Drivers of Health     Financial Resource Strain: Not on file   Food Insecurity: No Food Insecurity (9/9/2024)    Food Insecurity     Food Insecurity: Never true   Transportation Needs: No Transportation Needs (9/9/2024)    Transportation Needs     Lack of Transportation: No     Car Seat: Not on file   Physical Activity: Not on file   Stress: Not on file   Social Connections: Not on file   Housing Stability: Low Risk  (9/9/2024)    Housing Stability     Housing Instability: No     Housing Instability Emergency: Not on file     Crib or Bassinette: Not on file        Allergies:  Allergies[1]      Medications:  Current Outpatient Medications   Medication Sig Dispense Refill    HYDROcodone-acetaminophen 5-325 MG Oral Tab Take 1 tablet by mouth every 6 (six) hours as needed for Pain. 28 tablet 0    hydrOXYzine 50 MG Oral Tab Take 1 tablet (50 mg total) by mouth every 8 (eight) hours as needed for Anxiety. 60 tablet 1    Naloxone HCl 4 MG/0.1ML Nasal Liquid 4 mg by Nasal route as needed. If patient remains unresponsive, repeat dose in other nostril 2-5 minutes after first dose. (Patient not taking: Reported on 12/10/2024) 1 kit 0    Sildenafil Citrate 100 MG Oral Tab Take 0.5 tablets (50 mg total) by mouth.      Rizatriptan Benzoate 10 MG Oral Tab Take 1 tablet (10 mg total) by mouth as needed for Migraine. 10 tablet 1    sertraline 50 MG Oral Tab Take 1 tablet (50 mg total) by mouth every  morning.      gabapentin 100 MG Oral Cap Take 2 capsules (200 mg total) by mouth 3 (three) times daily for 10 days. 60 capsule 0        Review of Systems:  A 10-point system was reviewed.  Pertinent positives and negatives are noted in HPI.      Physical Exam:  BP (!) 136/91 (BP Location: Right arm, Patient Position: Sitting, Cuff Size: adult)   Pulse 79   Ht 73\"   Wt 190 lb (86.2 kg)   SpO2 95%   BMI 25.07 kg/m²         Neurological Exam:    AAOx3, following commands  PERRLA  EOMI  Face symmetrical  Tongue midline  Hearing symmetrical and intact to finger rub    No rhinorrhea or otorrhea    Romberg negative    Motor Strength:  5 out of 5 in all 4 extremities    Sensation to light touch:  Left lower extremity numbness to the first 4 digits on the dorsum of the foot    Incision:  Clean, dry, intact      Abdomen:  Soft, non-distended, non-tender, with no rebound or guarding.  No peritoneal signs.     Extremities:  Non-tender, no lower extremity edema noted.      Labs:  CBC:  Lab Results   Component Value Date    WBC 10.8 09/13/2024    HGB 14.1 09/13/2024    HCT 41.3 09/13/2024    MCV 88.2 09/13/2024    .0 09/13/2024      BMG:  Lab Results   Component Value Date     09/13/2024    K 4.7 09/13/2024    CO2 32.0 09/13/2024     09/13/2024    BUN 10 09/13/2024      INR:  Lab Results   Component Value Date    INR 0.96 09/06/2024    INR 1.00 04/23/2024          Diagnostics:  X-ray cervical spine dated 12/10/2024 reviewed:  There is loss of regional lordosis.  There is no fracture or subluxation.  There is maintenance of the discs throughout.    Diagnosis:  1. Status post lumbar spinal fusion  - XR LUMBAR SPINE (MIN 2 VIEWS) (CPT=72100); Future  - gabapentin 100 MG Oral Cap; Take 2 capsules (200 mg total) by mouth 3 (three) times daily for 10 days.  Dispense: 60 capsule; Refill: 0  - XR LUMBAR SPINE (MIN 2 VIEWS) (CPT=72100); Future    2. Cervicalgia  - PHYSICAL THERAPY  EXTERNAL      Assessment/Plan:  Louis Raymundo II returns to the office today for review 3 months postoperatively.  Patient endorsing left foot numbness, tingling and pain and I would like to initiate him on gabapentin to help with this.  Patient will continue to utilize Norco 5 mg as needed and we discussed weaning this medication over time.  I reviewed an x-ray of the cervical spine which appears within normal limits but due to continued neck pain I will provide him with an physical therapy order to initiate conservative treatment.  I would also like patient to complete an x-ray of the lumbar spine today for us to review as well as complete an additional x-ray of the lumbar spine in 3 months for us to review at his follow-up visit.      More than 30 minutes were spent during this visit and the coordination of this patient's care. All questions and concerns were addressed. We appreciate the opportunity to participate in the care of this patient. Please do not hesitate to call our office ([phone]) with any issues.    This note has been dictated utilizing voice recognition software. Unfortunately, this may lead to occasional typographical errors. If there are any questions regarding this, please do not hesitate to contact our office.           Tab Smith PA-C    12/10/2024  8:38 AM       [1]   Allergies  Allergen Reactions    Levonorgestrel-Ethinyl Estrad UNKNOWN    Mold OTHER (SEE COMMENTS)     Note: congestion    Other OTHER (SEE COMMENTS)     Note: congestion    Pollen OTHER (SEE COMMENTS)     Note: congestion    Seasonal OTHER (SEE COMMENTS)     Note: congestion    Tree Nuts UNKNOWN    Watermelon OTHER (SEE COMMENTS)     Note: itchy and congestion    Theophylline ITCHING and OTHER (SEE COMMENTS)

## 2024-12-18 ENCOUNTER — PATIENT MESSAGE (OUTPATIENT)
Dept: SURGERY | Facility: CLINIC | Age: 49
End: 2024-12-18

## 2024-12-18 DIAGNOSIS — Z98.1 STATUS POST LUMBAR SPINAL FUSION: Primary | ICD-10-CM

## 2024-12-18 RX ORDER — GABAPENTIN 100 MG/1
100 CAPSULE ORAL 3 TIMES DAILY
Qty: 90 CAPSULE | Refills: 0 | Status: SHIPPED | OUTPATIENT
Start: 2024-12-18 | End: 2025-01-17

## 2024-12-18 NOTE — TELEPHONE ENCOUNTER
Message below noted.    Patient has questions regarding if bilateral supination from [place] Cavus would have a negative impact on my lumbar discs.    LOV 12/10/24  \"Assessment/Plan:  Luois Raymundo II returns to the office today for review 3 months postoperatively.  Patient endorsing left foot numbness, tingling and pain and I would like to initiate him on gabapentin to help with this.  Patient will continue to utilize Norco 5 mg as needed and we discussed weaning this medication over time.  I reviewed an x-ray of the cervical spine which appears within normal limits but due to continued neck pain I will provide him with an physical therapy order to initiate conservative treatment.  I would also like patient to complete an x-ray of the lumbar spine today for us to review as well as complete an additional x-ray of the lumbar spine in 3 months for us to review at his follow-up visit.\"    Routed to Provider.

## 2024-12-18 NOTE — TELEPHONE ENCOUNTER
Patient calling to request refill of gabapentin 100 MG and Jasper 5-325mg.  Pharmacy [id number] - [address] [phone], [phone] [[id number]]     Patient informed of 48 hour refill policy excluding weekends and holidays.   Informed patient prescription is sent directly to pharmacy.    Further explained patient will not receive a call back once prescription is ready.

## 2024-12-18 NOTE — TELEPHONE ENCOUNTER
Refill request received.    Medication: gabapentin 100 MG Oral Cap      Date of last refill: 12/10/24 (#60/0)  [place]      Last office visit: 12/10/24  Due back to clinic per last office note:  3 months  Date next office visit scheduled:    Future Appointments   Date Time Provider Department Center   3/11/2025  8:00 AM Shawn Cardona MD [facility]UR[facility]           Last OV note recommendation:    \"Assessment/Plan:  Louis Raymundo II returns to the office today for review 3 months postoperatively.  Patient endorsing left foot numbness, tingling and pain and I would like to initiate him on gabapentin to help with this.  Patient will continue to utilize Norco 5 mg as needed and we discussed weaning this medication over time.  I reviewed an x-ray of the cervical spine which appears within normal limits but due to continued neck pain I will provide him with an physical therapy order to initiate conservative treatment.  I would also like patient to complete an x-ray of the lumbar spine today for us to review as well as complete an additional x-ray of the lumbar spine in 3 months for us to review at his follow-up visit.\"    Routed to Provider.

## 2024-12-19 NOTE — TELEPHONE ENCOUNTER
Message from Provider noted.    Patient acknowledged and appreciative of message.    Nothing needed further with this encounter.

## 2024-12-27 ENCOUNTER — PATIENT MESSAGE (OUTPATIENT)
Dept: SURGERY | Facility: CLINIC | Age: 49
End: 2024-12-27

## 2024-12-27 NOTE — TELEPHONE ENCOUNTER
Noted that patient has messaged and is asking for a [facility] refill. Patient underwent surgery on 9.9.24 with Dr. Cardona:      L3-4, L4-5, L5-S1 laminectomy.  Left L4-5 and L5-S1 complete facetectomies and transforaminal lumbar interbody fusion          Medication:   HYDROcodone-acetaminophen 5-325 MG Oral Tab 28 tablet 0 12/10/2024 12/17/2024    Sig - Route: Take 1 tablet by mouth every 6 (six) hours as needed for Pain. - Oral    Sent to pharmacy as: HYDROcodone-Acetaminophen 5-325 MG Oral Tablet (Norco)    Earliest Fill Date: 12/10/2024    E-Prescribing Status: Receipt confirmed by pharmacy (12/10/2024  4:30 PM CST)      Associated Diagnoses    Status post lumbar spinal fusion        Pharmacy    [id number] - [facility] [id number] [phone], [phone]       Date of last refill: 12.10.24  Date last filled per [facility] (if applicable): 12.10.24    Last office visit: 12.10.24  Due back to clinic per last office note:  3 months  Date next office visit scheduled:  [facility] Future Appointments    Encounter Information   Provider Department Center   3/11/2025 7:00 AM [facility] XR [address] X-ray - [place] [facility] EM [facility]   3/11/2025 8:00 AM Shawn Cardona MD         Last OV note per provider:      \"Diagnosis:  1. Status post lumbar spinal fusion  - XR LUMBAR SPINE (MIN 2 VIEWS) (CPT=72100); Future  - gabapentin 100 MG Oral Cap; Take 2 capsules (200 mg total) by mouth 3 (three) times daily for 10 days.  Dispense: 60 capsule; Refill: 0  - XR LUMBAR SPINE (MIN 2 VIEWS) (CPT=72100); Future     2. Cervicalgia  - PHYSICAL THERAPY EXTERNAL        Assessment/Plan:  Louis Raymundo II returns to the office today for review 3 months postoperatively.  Patient endorsing left foot numbness, tingling and pain and I would like to initiate him on gabapentin to help with this.  Patient will continue to utilize Norco 5 mg as needed and we discussed weaning this medication over time.  I reviewed an x-ray of the cervical spine  which appears within normal limits but due to continued neck pain I will provide him with an physical therapy order to initiate conservative treatment.  I would also like patient to complete an x-ray of the lumbar spine today for us to review as well as complete an additional x-ray of the lumbar spine in 3 months for us to review at his follow-up visit.\"    Messaged patient informing him of refill policy and recommended he present to ED for pain that cannot be managed at home.     Routed to CECILIA Curran.

## 2024-12-30 ENCOUNTER — TELEPHONE (OUTPATIENT)
Dept: SURGERY | Facility: CLINIC | Age: 49
End: 2024-12-30

## 2024-12-30 DIAGNOSIS — Z98.1 STATUS POST LUMBAR SPINAL FUSION: Primary | ICD-10-CM

## 2024-12-30 NOTE — TELEPHONE ENCOUNTER
Patient calling due to noticing numbness in L foot. On Saturday patient fell and now having back pain and has a boot. Patient is looking for anti-inflammatory medication and medical advice.

## 2024-12-30 NOTE — TELEPHONE ENCOUNTER
Message received from patient.    Patient acknowledged and appreciative of message.    Nothing needed further with this encounter.

## 2024-12-30 NOTE — TELEPHONE ENCOUNTER
Noted that patient has contacted [facility] requesting a return call to discuss his condition.     Patient underwent surgery with Dr. Cardona on 9/9/24:    L3-4, L4-5, L5-S1 laminectomy.  Left L4-5 and L5-S1 complete facetectomies and transforaminal lumbar interbody fusion Left General   POSTERIOR LUMBAR LAMINECT SPINAL FUS W/INSTR 2 LEV       Per Dr. Cardona at last office visit on 12.10.24:    \"Assessment/Plan:  Louis Raymundo II returns to the office today for review 3 months postoperatively.  Patient endorsing left foot numbness, tingling and pain and I would like to initiate him on gabapentin to help with this.  Patient will continue to utilize Norco 5 mg as needed and we discussed weaning this medication over time.  I reviewed an x-ray of the cervical spine which appears within normal limits but due to continued neck pain I will provide him with an physical therapy order to initiate conservative treatment.  I would also like patient to complete an x-ray of the lumbar spine today for us to review as well as complete an additional x-ray of the lumbar spine in 3 months for us to review at his follow-up visit.\"    Next imaging is scheduled for 3.11.25.     Called patient to discuss his concerns. Patient stated that:    -Since the surgery I have no feeling on the top of my left foot radiating to my shin.  -My left foot rolls kind of easily and that's what happened when I fell.   -My entire foot is black and went to urgent care and they put me in a boot to my knee.   -they did take foot X-rays.   -there is no pain med prescription from the urgent care.    -he took 2 Tylenol tablets and taking Lyrica.   -I have lower back pain which is 4-5/10 and foot pain 8/10 after the fall.  -he has been elevating leg and using ice packs.   -he is not worried about his fusion, so he is not asking for a new X-ray order.     Routed to CECILIA Curran.

## 2024-12-30 NOTE — TELEPHONE ENCOUNTER
I would suggest patient utilize over-the-counter Tylenol for the pain in his foot or discuss further with his PCP.  For mild to moderate back pain rated 4/5 out of 10 he should also receive some relief from the Tylenol.  At this time patient may also utilize ibuprofen intermittently for significant pain but he should use this sparingly.

## 2024-12-30 NOTE — TELEPHONE ENCOUNTER
Message below noted.    Advised patient of message and to reach back out with any other questions or concerns.    Patient is active on MyChart.  message sent.

## 2024-12-30 NOTE — TELEPHONE ENCOUNTER
We need to have patient evaluated by the pain management office for continued low back pain as he is nearly 4 months postoperative.

## 2024-12-31 NOTE — TELEPHONE ENCOUNTER
Reply sent, informing patient that he can call Pain Management or his PCP for medications and ways to manage pain, as he is greater than 3 months post op. Pain Management referral information provided.

## 2025-01-31 ENCOUNTER — OFFICE VISIT (OUTPATIENT)
Dept: PAIN CLINIC | Facility: CLINIC | Age: 50
End: 2025-01-31
Payer: OTHER GOVERNMENT

## 2025-01-31 VITALS
BODY MASS INDEX: 28 KG/M2 | WEIGHT: 210 LBS | DIASTOLIC BLOOD PRESSURE: 90 MMHG | SYSTOLIC BLOOD PRESSURE: 138 MMHG | OXYGEN SATURATION: 98 % | HEART RATE: 74 BPM

## 2025-01-31 DIAGNOSIS — Z98.1 HISTORY OF LUMBAR FUSION: Primary | ICD-10-CM

## 2025-01-31 DIAGNOSIS — M54.16 LEFT LUMBAR RADICULOPATHY: ICD-10-CM

## 2025-01-31 PROCEDURE — 99214 OFFICE O/P EST MOD 30 MIN: CPT | Performed by: PHYSICIAN ASSISTANT

## 2025-01-31 RX ORDER — DICYCLOMINE HYDROCHLORIDE 10 MG/1
20 CAPSULE ORAL
COMMUNITY
Start: 2024-12-31

## 2025-01-31 RX ORDER — TRAZODONE HYDROCHLORIDE 100 MG/1
0.5 TABLET ORAL NIGHTLY PRN
COMMUNITY
Start: 2025-01-06

## 2025-01-31 RX ORDER — HYDROCODONE BITARTRATE AND ACETAMINOPHEN 10; 325 MG/1; MG/1
TABLET ORAL
COMMUNITY
Start: 2025-01-27

## 2025-01-31 RX ORDER — CAPSAICIN 0.75 MG/G
CREAM TOPICAL
COMMUNITY
Start: 2025-01-23

## 2025-01-31 NOTE — PROGRESS NOTES
Subjective:   Patient ID: Louis Raymundo II is a 49 year old male.    HPI    History/Other:   Review of Systems  Current Outpatient Medications   Medication Sig Dispense Refill   • hydrOXYzine 50 MG Oral Tab Take 1 tablet (50 mg total) by mouth every 8 (eight) hours as needed for Anxiety. 60 tablet 0   • Naloxone HCl 4 MG/0.1ML Nasal Liquid 4 mg by Nasal route as needed. If patient remains unresponsive, repeat dose in other nostril 2-5 minutes after first dose. (Patient not taking: Reported on 12/10/2024) 1 kit 0   • Sildenafil Citrate 100 MG Oral Tab Take 0.5 tablets (50 mg total) by mouth.     • Rizatriptan Benzoate 10 MG Oral Tab Take 1 tablet (10 mg total) by mouth as needed for Migraine. 10 tablet 1   • sertraline 50 MG Oral Tab Take 1 tablet (50 mg total) by mouth every morning.       Allergies:Allergies[1]    Objective:   Physical Exam  Constitutional:          Assessment & Plan:   No diagnosis found.    No orders of the defined types were placed in this encounter.      Meds This Visit:  Requested Prescriptions      No prescriptions requested or ordered in this encounter       Imaging & Referrals:  None        Location of Pain: left side lumbar radiculopathy    Date Pain Began: 12/28/24          Work Related:   No        Receiving Work Comp/Disability:   No    Numeric Rating Scale:  Pain at Present:  9                                                                                                            (No Pain) 0  to  10 (Worst Pain)                 Minimum Pain:   6  Maximum Pain  10    Distribution of Pain:    left    Quality of Pain:   numbness, sharp/stabbing, and throbbing    Origin of Pain:    Surgical complications    Aggravating Factors:    Sitting, Standing, and Walking    Past Treatments for Current Pain Condition:   Surgery    Prior diagnostic testing for your pain:  MRI          [1]   Allergies  Allergen Reactions   • Levonorgestrel-Ethinyl Estrad UNKNOWN   • Mold OTHER (SEE COMMENTS)      Note: congestion   • Other OTHER (SEE COMMENTS)     Note: congestion   • Pollen OTHER (SEE COMMENTS)     Note: congestion   • Seasonal OTHER (SEE COMMENTS)     Note: congestion   • Watermelon OTHER (SEE COMMENTS)     Note: itchy and congestion   • Theophylline ITCHING and OTHER (SEE COMMENTS)

## 2025-01-31 NOTE — PATIENT INSTRUCTIONS
Refill policies:    Allow 2-3 business days for refills; controlled substances may take longer.  Contact your pharmacy at least 5 days prior to running out of medication and have them send an electronic request or submit request through the “request refill” option in your [employer] account.  Refills are not addressed on weekends; covering physicians do not authorize routine medications on weekends.  No narcotics or controlled substances are refilled after noon on Fridays or by on call physicians.  By law, narcotics must be electronically prescribed.  A 30 day supply with no refills is the maximum allowed.  If your prescription is due for a refill, you may be due for a follow up appointment.  To best provide you care, patients receiving routine medications need to be seen at least once a year.  Patients receiving narcotic/controlled substance medications need to be seen at least once every 3 months.  In the event that your preferred pharmacy does not have the requested medication in stock (e.g. Backordered), it is your responsibility to find another pharmacy that has the requested medication available.  We will gladly send a new prescription to that pharmacy at your request.    Scheduling Tests:    If your physician has ordered radiology tests such as MRI or CT scans, please contact Central Scheduling at [phone] right away to schedule the test.  Once scheduled, the [facility] Centralized Referral Team will work with your insurance carrier to obtain pre-certification or prior authorization.  Depending on your insurance carrier, approval may take 3-10 days.  It is highly recommended patients assure they have received an authorization before having a test performed.  If test is done without insurance authorization, patient may be responsible for the entire amount billed.      Precertification and Prior Authorizations:  If your physician has recommended that you have a procedure or additional testing performed the [facility]  Centralized Referral Team will contact your insurance carrier to obtain pre-certification or prior authorization.    You are strongly encouraged to contact your insurance carrier to verify that your procedure/test has been approved and is a COVERED benefit.  Although the [facility] Centralized Referral Team does its due diligence, the insurance carrier gives the disclaimer that \"Although the procedure is authorized, this does not guarantee payment.\"    Ultimately the patient is responsible for payment.   Thank you for your understanding in this matter.  Paperwork Completion:  If you require FMLA or disability paperwork for your recovery, please make sure to either drop it off or have it faxed to our office at [phone]. Be sure the form has your name and date of birth on it.  The form will be faxed to our Forms Department and they will complete it for you.  There is a 25$ fee for all forms that need to be filled out.  Please be aware there is a 10-14 day turnaround time.  You will need to sign a release of information (PATTI) form if your paperwork does not come with one.  You may call the Forms Department with any questions at [phone].  Their fax number is [phone].

## 2025-01-31 NOTE — PROGRESS NOTES
Patient: Louis Raymundo II  Medical Record Number: [id number]  Site: [facility]  Referring Physician:  Tab Smith  PCP: Dr. Ray    Dear Dr. Smith:    Thank you very much for requesting this consultation. I had the opportunity to evaluate and initiate care for your patient today, as per your request.    HISTORY OF CHIEF COMPLAINT:      Louis Raymundo II is a 49 year old male, who complains of low back and L LE pain.    Patient is here today at the request of neurosurgery with pain in above-described distribution that began years ago (1997).  States that he \"did everything\" for it, to include PT, HEP, chiropractic care, and injections.  He had been on narcotics for over decade from PCP, using 10 mg norco up to 3/day.  None of this was effective, and was evaluated by neurosurgery.  States that he underwent a 3 level laminectomy (L3-4, L4-5, L5-S1) with an L5-S1 fusion on 9/9/2024 with Dr. Cardona.  After surgery, had been on postoperative opiates, which were slowly weaned, with last prescription on 12/10/2024.  He had been placed on gabapentin, 100 mg 3 times daily, to partial relief.  [facility] and [facility] back on norco, and was increased to neurontin 300 mg TID.      On 12/28/24 he rolled his ankle, and sustained ligament injury.  He has discussed with podiatry, and have discussed additional surgery, if it does not improve.  He had been sent here due to his continued norco requirements, though as [facility], would rather discuss workup for his residual L LE pain.      VAS Pain Score:  10/10    Aggravating Factors: Relieving Factors:   Sitting  Standing  Lying down      Past Treatment Attempted/Patient’s Response:  As above     Past Medical History:    Anxiety state    Back problem    degenerative disc disease    Depression    Esophageal reflux    History of blood transfusion    with emergency appy, no reaction    Migraines    Osteoarthritis    Visual  impairment    readers      Past Surgical History:   Procedure Laterality Date    Appendectomy  1992    Colectomy      Colonoscopy  09/03/2021    Laparoscopic cholecystectomy      Other      peritonitis r/t ruptured appendix -  week later 9 inches colon removed    Other surgical history Bilateral     Sesamoid feet      Family History   Problem Relation Age of Onset    Other (Other) Father         brain trauma    Cancer Mother         breast      Social History     Socioeconomic History    Marital status:    Tobacco Use    Smoking status: Never    Smokeless tobacco: Never   Vaping Use    Vaping status: Never Used   Substance and Sexual Activity    Alcohol use: Never    Drug use: Not Currently   Other Topics Concern    Caffeine Concern Yes    Special Diet No      Current Medications:  Current Outpatient Medications   Medication Sig Dispense Refill    hydrOXYzine 50 MG Oral Tab Take 1 tablet (50 mg total) by mouth every 8 (eight) hours as needed for Anxiety. 60 tablet 0    Naloxone HCl 4 MG/0.1ML Nasal Liquid 4 mg by Nasal route as needed. If patient remains unresponsive, repeat dose in other nostril 2-5 minutes after first dose. (Patient not taking: Reported on 12/10/2024) 1 kit 0    Sildenafil Citrate 100 MG Oral Tab Take 0.5 tablets (50 mg total) by mouth.      Rizatriptan Benzoate 10 MG Oral Tab Take 1 tablet (10 mg total) by mouth as needed for Migraine. 10 tablet 1    sertraline 50 MG Oral Tab Take 1 tablet (50 mg total) by mouth every morning.          Functional Assessment: Patient reports that they are able to complete all of their ADL's such as eating, bathing, using the toilet, dressing and getting up from a bed or a chair independently.    Work History:  The patient currently works full time as floor covering .       REVIEW OF SYSTEMS:   10 point review of systems is otherwise negative,unless otherwise in HPI.      Radiology/Lab Test Reviewed:  no post op MRI.  XR 12/10/24:    CONCLUSION:    1. Posterior lumbosacral fusion hardware at L4-S1 without radiographic evidence of complication.      2. No radiographically visible acute osseous injury of the lumbar spine.     CBC:    Lab Results   Component Value Date    WBC 10.8 09/13/2024    WBC 11.7 (H) 09/12/2024    WBC 12.0 (H) 09/11/2024   No results found for: \"HEMOGLOBIN\"  Lab Results   Component Value Date    .0 09/13/2024    .0 09/12/2024    .0 09/11/2024         PHYSICAL EXAMIMATION   PHYSICAL EXAMINATION: Louis Raymundo II is a 49 year old male who is observed sitting comfortably on a chair in the exam room alert and oriented times three. He looks consistent with his stated age.    Ht Readings from Last 1 Encounters:   12/10/24 73\"     Wt Readings from Last 1 Encounters:   12/10/24 190 lb (86.2 kg)     The patient is well developed, well nourished, normal body habitus, well muscled. He moves independently from sitting to standing with ease.       Inspection:  No acute distress, in L lower leg afo    Patient displays Antalgic gait.    Coordination:  Well-coordinated, fluent gait, able to engage in rapid alternating movements of upper and lower extremities.    ROM Lumbar Spine:  See chart below:  Motion Right (+ or -) Left (+ or -)   Lumbar Flexion - +   Lumbar Extension - +   Lumbar Bending - +   Lumbar Extension/ twisting motion - +     Lumbar/Sacral Integument:  Skin over lumbar sacral spine is intact without rashes, excoriations, lesions or erythema noted    Palpation:  See chart below:  Palpation of lumbar area Right (+ or -) Left (+ or -)   Lumbar facets - -   Lumbar paraspinals - -   Piriformis - -   SIJ - -   Trochanteric Bursa - -     Strength:  Strength deficits noted:  left drop foot, in afo, with L IP weakness (4/5)     Sensation:  No sensory deficits noted on bilateral lower extremities to light touch    Tests:  Test Right (+ or -) Left (+ or -)   SLR - -   Ajit’s     Babinski     Clonus       HEAD/NECK: Head is  normocephalic, neck supple  EYES: EOMI, EM  SKIN EXAM: Skin is intact, head, neck, trunk and arms/legs. No rashes, mottling or ulcerations.  LYMPH EXAM: There is no lymph edema in either lower extremity.  VASCULAR EXAM: Pedal pulses are normal bilaterally, with good distal perfusion. No clubbing or cyanosis.  ABDOMINAL EXAM: Abdomen is soft without masses palpated.  HEART: normal, regular, S1 and S2  LUNGS:CTA  MUSCULOSKELETAL: Smooth, pain-free ROM to bilat hips, ankles, and knees.     Do you have any known blood/bleeding disorders?  No  Does patient currently take blood thinners?   None  Does patient currently take any antibiotics?   No      Patient is currently on pain medications:  No  Reason pain medications are prescribed: N/A  Pain medications are prescribed by: N/A  [place] Prescription Monitoring review: N/A  DIRE: N/A  Treatment decision: N/A    MEDICAL DECISION MAKING:     Impression: History of lumbar fusion, left lumbar radiculopathy, chronic opiate use    Low back and radiating left gluteal pain to the anterior, lateral and posterior thigh, not passing the knee.  States that symptoms have been present since 1997 and has been on opiates for well over a decade as a result.  After failure of reasonable conservative management, did undergo L3-4, L4-5, L5-S1 Lami with 4 5 and 5 1 fusion on 9/9/2024 with Dr. Cardona.  Initially, states that he had been making progress though after a fall on 12/28/2024 had flare of pain more intense than it had been prior to surgery.  During this fall he also sustained ligamentous injury to his ankle and is working with podiatry.      He had been managed on his opiates preoperatively by [facility], though in the immediate 3 months after his surgery, his opiates were managed by his surgeon.  As he was struggling to wean off of them, had been sent here for further options, though he has since resumed prescriptions of his Norco through his primary  doctor [facility].  We did discuss reduction of medication, which he will consider, though for now intends on using the medication provided by his PCP.    With regards to the flare of pain he had following his fall, he is asking for an MRI to evaluate for any progression of known lumbar pathology.  On exam he does have pain with range of motion lumbar spine with weakness of the left iliopsoas as well as a known left dropfoot (in an AFO).  Negative straight leg raise, no focal sensory deficits.    Per his request, happy to send him for an MRI of the lumbar spine with and without contrast.  He states that he will probably have this [facility] so I did ask him to bring images should he have this done.  If we do identify any new or recurrent disc pathology, happy to give consideration to injections, though he is skeptical, having failed conservative managements in the past, to include injections.  If no anatomic anomalies found to explain this pain, we did discuss possible spinal cord stimulator though he states he has no real interest in this.    Plan: MRI lumbar spine with and without contrast.  Will discuss options after obtaining updated imaging.  With regards to his medications, this has been taken over by his primary [facility], and will defer to them.  If he wants to wean off his medications, happy to see him back for formal weaning.      The patient indicates understanding of these issues and agrees to the plan.      Thank you very much.     Respectfully yours,    CECILIA Macedo

## 2025-02-18 ENCOUNTER — TELEPHONE (OUTPATIENT)
Dept: PAIN CLINIC | Facility: CLINIC | Age: 50
End: 2025-02-18

## 2025-02-18 NOTE — TELEPHONE ENCOUNTER
Last office visit 9/24/2020     Last written 9- x 5 refills    Next office visit scheduled  Not scheduled    Requested Prescriptions     Pending Prescriptions Disp Refills    nabumetone (RELAFEN) 500 MG tablet 60 tablet 5     Sig: Take 1 tablet by mouth 2 times daily    QUEtiapine (SEROQUEL) 25 MG tablet 60 tablet 5     Sig: Take 1 tablet by mouth 2 times daily    traZODone (DESYREL) 100 MG tablet 30 tablet 5     Sig: Take 1 tablet by mouth nightly Patient states he is in severe pain. Patient said the pain is from the left knee down to the left foot.  Patient has MRI scheduled for 2/28.  Patient states he [facility] but he is done with the medication.  Patient asking if there is any medication that Dandre can suggest.

## 2025-02-19 NOTE — TELEPHONE ENCOUNTER
Dandre Joyner PA  You16 hours ago (3:49 PM)       I would defer to the people managing his medication (I believe he told me it was [facility])

## 2025-03-10 RX ORDER — METHYLPREDNISOLONE 4 MG/1
TABLET ORAL
COMMUNITY
Start: 2025-02-14

## 2025-03-10 NOTE — PATIENT INSTRUCTIONS
Refill policies:    Allow 2-3 business days for refills; controlled substances may take longer.  Contact your pharmacy at least 5 days prior to running out of medication and have them send an electronic request or submit request through the “request refill” option in your [employer] account.  Refills are not addressed on weekends; covering physicians do not authorize routine medications on weekends.  No narcotics or controlled substances are refilled after noon on Fridays or by on call physicians.  By law, narcotics must be electronically prescribed.  A 30 day supply with no refills is the maximum allowed.  If your prescription is due for a refill, you may be due for a follow up appointment.  To best provide you care, patients receiving routine medications need to be seen at least once a year.  Patients receiving narcotic/controlled substance medications need to be seen at least once every 3 months.  In the event that your preferred pharmacy does not have the requested medication in stock (e.g. Backordered), it is your responsibility to find another pharmacy that has the requested medication available.  We will gladly send a new prescription to that pharmacy at your request.    Scheduling Tests:    If your physician has ordered radiology tests such as MRI or CT scans, please contact Central Scheduling at [phone] right away to schedule the test.  Once scheduled, the [facility] Centralized Referral Team will work with your insurance carrier to obtain pre-certification or prior authorization.  Depending on your insurance carrier, approval may take 3-10 days.  It is highly recommended patients assure they have received an authorization before having a test performed.  If test is done without insurance authorization, patient may be responsible for the entire amount billed.      Precertification and Prior Authorizations:  If your physician has recommended that you have a procedure or additional testing performed the [facility]  Centralized Referral Team will contact your insurance carrier to obtain pre-certification or prior authorization.    You are strongly encouraged to contact your insurance carrier to verify that your procedure/test has been approved and is a COVERED benefit.  Although the [facility] Centralized Referral Team does its due diligence, the insurance carrier gives the disclaimer that \"Although the procedure is authorized, this does not guarantee payment.\"    Ultimately the patient is responsible for payment.   Thank you for your understanding in this matter.  Paperwork Completion:  If you require FMLA or disability paperwork for your recovery, please make sure to either drop it off or have it faxed to our office at [phone]. Be sure the form has your name and date of birth on it.  The form will be faxed to our Forms Department and they will complete it for you.  There is a 25$ fee for all forms that need to be filled out.  Please be aware there is a 10-14 day turnaround time.  You will need to sign a release of information (PATTI) form if your paperwork does not come with one.  You may call the Forms Department with any questions at [phone].  Their fax number is [phone].

## 2025-03-11 ENCOUNTER — HOSPITAL ENCOUNTER (OUTPATIENT)
Dept: GENERAL RADIOLOGY | Facility: HOSPITAL | Age: 50
Discharge: HOME | End: 2025-03-11
Payer: OTHER GOVERNMENT

## 2025-03-11 ENCOUNTER — TELEPHONE (OUTPATIENT)
Dept: SURGERY | Facility: CLINIC | Age: 50
End: 2025-03-11

## 2025-03-11 ENCOUNTER — OFFICE VISIT (OUTPATIENT)
Dept: SURGERY | Facility: CLINIC | Age: 50
End: 2025-03-11
Payer: OTHER GOVERNMENT

## 2025-03-11 VITALS
WEIGHT: 215 LBS | DIASTOLIC BLOOD PRESSURE: 83 MMHG | HEIGHT: 73 IN | SYSTOLIC BLOOD PRESSURE: 135 MMHG | HEART RATE: 104 BPM | BODY MASS INDEX: 28.49 KG/M2 | OXYGEN SATURATION: 97 %

## 2025-03-11 DIAGNOSIS — Z98.1 STATUS POST LUMBAR SPINAL FUSION: ICD-10-CM

## 2025-03-11 DIAGNOSIS — Z98.1 STATUS POST LUMBAR SPINAL FUSION: Primary | ICD-10-CM

## 2025-03-11 PROCEDURE — 72100 X-RAY EXAM L-S SPINE 2/3 VWS: CPT

## 2025-03-11 PROCEDURE — 99214 OFFICE O/P EST MOD 30 MIN: CPT | Performed by: NEUROLOGICAL SURGERY

## 2025-03-11 RX ORDER — TADALAFIL 20 MG/1
20 TABLET ORAL
COMMUNITY
Start: 2025-02-25

## 2025-03-11 RX ORDER — CYCLOBENZAPRINE HCL 10 MG
10 TABLET ORAL
COMMUNITY
Start: 2025-03-04

## 2025-03-11 NOTE — PROGRESS NOTES
KAREN REYEZ M.D., F.A.A.N.S.     of Neurosurgery  [facility]  Board Certified Neurosurgeon                          [facility], [facility], [facility] Neuro[facility]        [address]  [address] [place] [id number]    PHONE  [phone]          FAX  [phone]    [email or web address]      OFFICE FOLLOW-UP NOTE      Louis Raymundo II    : 1975    MRN: [id number]  CSN: [id number]      PCP: Carlee Ray  Referring Provider: No ref. provider found    Insurance: Payor: [employer] / Plan: [facility] [employer]UM / Product Type: *No Product type* /     Date of Visit: 3/11/2025    Reason for Visit:   Chief Complaint    Follow - Up                         History of Present Care:  Louis Raymundo II is a a(n) 49 year old, male.  Our patient has been doing really well until December when he is rolled his left ankle and apparently had a possible hairline fracture of the tibia which led to an injury to his dorsiflexion ability.  He is being evaluated by a foot doctor for that purpose.  However, also recently, he has had pain radiating down the left leg to the medial malleolus.  He is here with a recent x-ray, 6 months status post L4-S1 interbody fusion as well as an MRI.      History:  .  Past Medical History:    Anxiety state    Back problem    degenerative disc disease    Depression    Esophageal reflux    History of blood transfusion    with emergency appy, no reaction    Migraines    Osteoarthritis    Visual impairment    readers      Past Surgical History:   Procedure Laterality Date    Appendectomy      Colectomy      Colonoscopy  2021    Laparoscopic cholecystectomy      Other      peritonitis r/t ruptured appendix -  week later 9 inches colon removed    Other surgical history Bilateral     Sesamoid feet      Family History   Problem Relation Age of Onset     Other (Other) Father         brain trauma    Cancer Mother         breast      Social History     Socioeconomic History    Marital status:      Spouse name: Not on file    Number of children: Not on file    Years of education: Not on file    Highest education level: Not on file   Occupational History    Not on file   Tobacco Use    Smoking status: Never    Smokeless tobacco: Never   Vaping Use    Vaping status: Never Used   Substance and Sexual Activity    Alcohol use: Never    Drug use: Not Currently    Sexual activity: Not on file   Other Topics Concern    Caffeine Concern Yes    Exercise Not Asked    Seat Belt Not Asked    Special Diet No    Stress Concern Not Asked    Weight Concern Not Asked   Social History Narrative    Not on file     Social Drivers of Health     Food Insecurity: No Food Insecurity (9/9/2024)    Food Insecurity     Food Insecurity: Never true   Transportation Needs: No Transportation Needs (9/9/2024)    Transportation Needs     Lack of Transportation: No     Car Seat: Not on file   Stress: Not on file   Housing Stability: Low Risk  (9/9/2024)    Housing Stability     Housing Instability: No     Housing Instability Emergency: Not on file     Crib or Bassinette: Not on file        Allergies:  Allergies[1]      Medications:  Current Outpatient Medications   Medication Sig Dispense Refill    cyclobenzaprine 10 MG Oral Tab Take 1 tablet (10 mg total) by mouth.      Tadalafil 20 MG Oral Tab Take 1 tablet (20 mg total) by mouth.      methylPREDNISolone 4 MG Oral Tablet Therapy Pack TAKE 1 ROW OF TABLETS BY MOUTH EACH DAY AS INSTRUCTED ON THE PACKAGE      Capsaicin 0.075 % External Cream Apply topically.      dicyclomine 10 MG Oral Cap Take 2 capsules (20 mg total) by mouth.      HYDROcodone-acetaminophen  MG Oral Tab       traZODone 100 MG Oral Tab Take 0.5 tablets (50 mg total) by mouth nightly as needed.      omeprazole 20 MG Oral Capsule Delayed Release Take 1 capsule (20 mg total)  by mouth every morning before breakfast.      hydrOXYzine 50 MG Oral Tab Take 1 tablet (50 mg total) by mouth every 8 (eight) hours as needed for Anxiety. 60 tablet 0    Naloxone HCl 4 MG/0.1ML Nasal Liquid 4 mg by Nasal route as needed. If patient remains unresponsive, repeat dose in other nostril 2-5 minutes after first dose. (Patient not taking: Reported on 1/31/2025) 1 kit 0    Sildenafil Citrate 100 MG Oral Tab Take 0.5 tablets (50 mg total) by mouth.      Rizatriptan Benzoate 10 MG Oral Tab Take 1 tablet (10 mg total) by mouth as needed for Migraine. 10 tablet 1    sertraline 50 MG Oral Tab Take 1 tablet (50 mg total) by mouth every morning.          Review of Systems:  A 10-point system was reviewed.  Pertinent positives and negatives are noted in HPI.      Physical Exam:  /83 (BP Location: Right arm, Patient Position: Sitting, Cuff Size: adult)   Pulse 104   Ht 73\"   Wt 215 lb (97.5 kg)   SpO2 97%   BMI 28.37 kg/m²         Neurological Exam:    AAOx3, following commands  PERRLA  EOMI  Face symmetrical  Tongue midline  Hearing symmetrical and intact to finger rub    No rhinorrhea or otorrhea    Romberg negative    Motor Strength:  Left dorsiflexion weakness, rest of the left lower extremity 5 out of 5    Sensation to light touch:  Some asymmetry along the left medial malleolus    Incision:  Clean dry and intact    Abdomen:  Soft, non-distended, non-tender, with no rebound or guarding.  No peritoneal signs.     Extremities:  Non-tender, no lower extremity edema noted.      Labs:  CBC:  Lab Results   Component Value Date    WBC 10.8 09/13/2024    HGB 14.1 09/13/2024    HCT 41.3 09/13/2024    MCV 88.2 09/13/2024    .0 09/13/2024      BMG:  Lab Results   Component Value Date     09/13/2024    K 4.7 09/13/2024    CO2 32.0 09/13/2024     09/13/2024    BUN 10 09/13/2024      INR:  Lab Results   Component Value Date    INR 0.96 09/06/2024    INR 1.00 04/23/2024          Diagnostics:  I  reviewed the x-ray of the lumbar spine with evidence of L4-S1 interbody fusion.  The hardware is properly positioned and there is no evidence of any complications.    I reviewed an MRI of the lumbar spine without contrast.  Evidence of L4-S1 interbody fusion, uncomplicated.  At L3-4, there is a disc prolapse paracentrally to the left, causing lateral recess stenosis.    Diagnosis:  1. Status post lumbar spinal fusion      Assessment/Plan:  Our patient presents with a peripheral nerve injury that occurred several months after our lumbar fusion.  He is being evaluated and managed by a foot doctor and is utilizing an AFO.  As far as his radiculopathy is concerned, it is possible that the L3-4 disc prolapse is causing an irritation of the left L4 nerve root and we will refer him to Dr. Behar to consider an ELVIRA.  We will also increase his Neurontin to 400 3 times daily and have him follow-up at the 1 year postop petrona with an x-ray of the lumbar spine.    More than 30 minutes were spent during this visit and the coordination of this patient's care. All questions and concerns were addressed. We appreciate the opportunity to participate in the care of this patient. Please do not hesitate to call our office ([phone]) with any issues.        Shawn Cardona M.D., F.A.A.N.S.    3/11/2025  8:50 AM    This note has been dictated utilizing voice recognition software. Unfortunately, this may lead to occasional typographical errors. If there are any questions regarding this, please do not hesitate to contact our office.        [1]   Allergies  Allergen Reactions    Citrullus Vulgaris UNKNOWN     watermelon preparation    Levonorgestrel-Ethinyl Estrad UNKNOWN     ethinyl estradiol / levonorgestrel    Mold OTHER (SEE COMMENTS)     Note: congestion    Pollen OTHER (SEE COMMENTS)     Note: congestion    Pollen Extract UNKNOWN     POLLEN EXTRACTS    Seasonal OTHER (SEE COMMENTS)     Note: congestion    Tree Nuts UNKNOWN     Tree nut  (substance)    Watermelon OTHER (SEE COMMENTS)     Note: itchy and congestion    Theophylline ITCHING and OTHER (SEE COMMENTS)

## 2025-03-11 NOTE — TELEPHONE ENCOUNTER
Patient brought imaging disc to his appointment with Dr. Cardona.    03/03/2025 - MRI Lumbar Spine W and WO Contrast     Films uploaded to PACS.   Disc returned to the patient.

## 2025-03-12 ENCOUNTER — PATIENT MESSAGE (OUTPATIENT)
Dept: SURGERY | Facility: CLINIC | Age: 50
End: 2025-03-12

## 2025-03-12 NOTE — TELEPHONE ENCOUNTER
Noted that patient has messaged, asking for a refill on Norco and [facility]     Patient provided fax number: [phone].     Patient underwent surgery with Dr. Cardona on 9.9.24:    L3-4, L4-5, L5-S1 laminectomy.  Left L4-5 and L5-S1 complete facetectomies and transforaminal lumbar interbody fusion Left General   POSTERIOR LUMBAR LAMINECT SPINAL FUS W/INSTR 2 LEV       Noted that patient saw Dr. Cardona yesterday in office.  Per Dr. Cardona on 3.11.25:    \"Diagnostics:  I reviewed the x-ray of the lumbar spine with evidence of L4-S1 interbody fusion.  The hardware is properly positioned and there is no evidence of any complications.     I reviewed an MRI of the lumbar spine without contrast.  Evidence of L4-S1 interbody fusion, uncomplicated.  At L3-4, there is a disc prolapse paracentrally to the left, causing lateral recess stenosis.     Diagnosis:  1. Status post lumbar spinal fusion     Assessment/Plan:  Our patient presents with a peripheral nerve injury that occurred several months after our lumbar fusion.  He is being evaluated and managed by a foot doctor and is utilizing an AFO.  As far as his radiculopathy is concerned, it is possible that the L3-4 disc prolapse is causing an irritation of the left L4 nerve root and we will refer him to Dr. Behar to consider an ELVIRA.  We will also increase his Neurontin to 400 3 times daily and have him follow-up at the 1 year postop petrona with an x-ray of the lumbar spine.\"    Medication refill request:    Medication Quantity Refills Start End   HYDROcodone-acetaminophen  MG Oral Tab -- -- 1/27/2025 --   Route:   (none)     Class:   Historical       Patient states in his message that his PCP is the original ordering doctor and that he received a 2 week supply of pain med from her and he will soon be running out.     Faxed Gabapentin order to fax number provided by patient.     Routed to CECILIA Curran.

## 2025-03-12 NOTE — TELEPHONE ENCOUNTER
[facility] This was the only med prescribed by Dr. Cardona yesterday. Patient is wanting Norco now.     Norco last prescribed by PCP on 3/4/25 for #28 for 14 day supply.according to [place] .  Patient is looking for refill on this medication.

## 2025-03-13 NOTE — TELEPHONE ENCOUNTER
Received feedback from provider. Response given to patient to contact pain management for pain medication. Informed patient that Gabapentin was faxed.

## 2025-06-16 NOTE — TELEPHONE ENCOUNTER
Patient calling to request refill of     oxyCODONE 10 MG Oral Tab       Pharmacy [facility]     Patient informed of 48 hour refill policy excluding weekends and holidays.     Informed patient prescription is sent directly to pharmacy.     Further explained patient will not receive a call back once prescription is ready.    No

## (undated) DEVICE — SNAP KOVER: Brand: UNBRANDED

## (undated) DEVICE — SUT VCRL 0 18IN CT-2 ABSRB VLT CR L26MM 1/2

## (undated) DEVICE — BIPOLAR SEALER 23-112-1 AQM 6.0: Brand: AQUAMANTYS™

## (undated) DEVICE — [HIGH FLOW INSUFFLATOR,  DO NOT USE IF PACKAGE IS DAMAGED,  KEEP DRY,  KEEP AWAY FROM SUNLIGHT,  PROTECT FROM HEAT AND RADIOACTIVE SOURCES.]: Brand: PNEUMOSURE

## (undated) DEVICE — CONTAINER,SPECIMEN,OR STERILE,4OZ: Brand: MEDLINE

## (undated) DEVICE — GAUZE,SPONGE,4"X4",16PLY,XRAY,STRL,LF: Brand: MEDLINE

## (undated) DEVICE — TROCAR: Brand: KII FIOS FIRST ENTRY

## (undated) DEVICE — APPLICATOR PREP 26ML CHG 2% ISO ALC 70%

## (undated) DEVICE — COVER,TABLE,60X90,STERILE: Brand: MEDLINE

## (undated) DEVICE — ENCORE® LATEX MICRO SIZE 7.5, STERILE LATEX POWDER-FREE SURGICAL GLOVE: Brand: ENCORE

## (undated) DEVICE — KIT HEMSTAT MTRX 8ML PORCINE GEL HUM THROM

## (undated) DEVICE — SUT MCRYL 3-0 18IN PS-2 ABSRB UD 19MM 3/8 CIR

## (undated) DEVICE — Device: Brand: JELCO

## (undated) DEVICE — ELECTRODE ES L16.5CM BLDE MPLR OPN APPRCH EZ

## (undated) DEVICE — TRAY INSTR PWR NUVASIVE

## (undated) DEVICE — C-ARMOR C-ARM EQUIPMENT COVERS CLEAR STERILE UNIVERSAL FIT 12 PER CASE: Brand: C-ARMOR

## (undated) DEVICE — SOL  .9 3000ML

## (undated) DEVICE — LIGAMAX 5 MM ENDOSCOPIC MULTIPLE CLIP APPLIER: Brand: LIGAMAX

## (undated) DEVICE — SUT VCRL 2-0 18IN ABSRB UD CR L26MM CT-2

## (undated) DEVICE — PRECISION MATCH HEAD

## (undated) DEVICE — LAP CHOLE: Brand: MEDLINE INDUSTRIES, INC.

## (undated) DEVICE — SOL  .9 1000ML BTL

## (undated) DEVICE — PACK CDS LAMINECTOMY

## (undated) DEVICE — SHEET,DRAPE,53X77,STERILE: Brand: MEDLINE

## (undated) DEVICE — MONITORING NEUROPHYSIOLOGICAL

## (undated) DEVICE — SOLUTION IRRIG 1000ML 0.9% NACL USP BG

## (undated) DEVICE — TISSUE RETRIEVAL SYSTEM: Brand: INZII RETRIEVAL SYSTEM

## (undated) DEVICE — TROCAR: Brand: KII® SLEEVE

## (undated) DEVICE — DERMABOND LIQUID ADHESIVE

## (undated) DEVICE — PENCIL ES BTTN SWCH W/ TIP HOLSTER E-Z CLN

## (undated) DEVICE — ANTIBACTERIAL VIOLET BRAIDED (POLYGLACTIN 910), SYNTHETIC ABSORBABLE SUTURE: Brand: COATED VICRYL

## (undated) DEVICE — INTENDED USE FOR SURGICAL MARKING ON INTACT SKIN, ALSO PROVIDES A PERMANENT METHOD OF IDENTIFYING OBJECTS IN THE OPERATING ROOM: Brand: WRITESITE® PLUS MINI PREP RESISTANT MARKER

## (undated) DEVICE — KIT SUR ACCS MAXCESS

## (undated) DEVICE — DISPOSABLE SUCTION/IRRIGATOR TUBE SET: Brand: AHTO

## (undated) DEVICE — NEEDLE NRV STIM BVL TIP INSUL PEDCL ACCS SYS

## (undated) DEVICE — SOLUTION IRRIG 1000ML 0.9% NACL USP BTL

## (undated) DEVICE — DISPOSABLE SLIM BIPOLAR FORCEPS, NON-STICK,: Brand: SPETZLER-MALIS

## (undated) DEVICE — WRAP COOLING BACK W/NO PILLOW

## (undated) DEVICE — TRAY CATH FOLEY 16FR INCLUDE BARDX IC COMPLT CARE

## (undated) DEVICE — SUTURE VICRYL 0 UR-6

## (undated) DEVICE — GLOVE SUR 8 SENSICARE PI PIP GRN PWD F

## (undated) DEVICE — SUTURE PDS II 4-0 PS-2

## (undated) DEVICE — ADHESIVE SKIN TOP FOR WND CLSR DERMBND ADV

## (undated) DEVICE — SYSTEM DEL GRFT MOD MAS

## (undated) DEVICE — GLOVE SUR 8 SENSICARE PI MIC PIP CRM PWD F

## (undated) NOTE — LETTER
Patient: Louis Raymundo II  YOB: 1975   Member ID:     [id number]     Case Reference Number: [id number]     To Whom It May Concern:    I am writing to provide additional information to support my claim of treatment of Louis Raymundo II with Left minimally invasive Lumbar 4-5, Lumbar 5-Sacral 1 laminectomy and transforaminal lumbar interbody fusion. In brief, treatment of Louis Raymundo II with my recommended neurosurgical intervention is medically appropriate and necessary and should be a covered and reimbursed service.     My records indicate that Louis Raymundo II is a 48 year old male.  Louis Raymundo II has been under my care since 9.14.23.  At the time of initial consultation, Louis Raymundo II presented with reports of 15+ years of lower back pain and alternating lower extremity pain. Most recently, his pain has been predominantly intense, radiating down the posterior aspect of his left lower extremity to the heel. He has difficulty sleeping and accomplishing his ADL's. He has undergone physical therapy and multiple epidural steroid injections as well as facet joint oblations without any significant improvement.    Incidentally, he also reports a history of neck pain and cervicogenic headaches as well as bilateral upper extremity numbness and tingling. He states that he has had difficulty with writing and also holding things.Louis Raymundo II was recommended to complete an MRI of the lumbar and thoracic spine on 9/7/23 and 9/27/23.   Additionally, Louis Raymundo II has also completed BILATERAL L4-L5 and L5-S1 facet joint injections under local anesthesia, Caudal ELVIRA under local anesthesia, Left L5 and S1 Transforaminal epidural steroid injections under IVCS.  My patient has tried and failed conservative medication management by taking Lyrica, Norco, Cyclobenzaprine, medrol dosepak, and Tylenol.     Louis Raymundo II returned to our clinic on 4.23.24 with significantly worsened lower back  discomfort.  Louis Raymundo II stated that the pain radiates into his left lower extremity down to the heel.  He also has occasional right buttock discomfort but not pain radiating below.  The pain reaches a 10 out of 10 pain scale level, and he has difficulty getting out of bed from time to time.  He has attempted physical therapy, multiple epidural steroid injections as well as facet rhizotomies without any significant improvement. Based on the current symptoms and history of failed conservative treatment, Louis Raymundo II was recommended to complete  Left MIS L4-5, L5-S1 laminectomy, and transforaminal lumbar interbody fusion for treatment of current symptomatic condition.     Additionally, the requested treatment was denied due to your claim that this is an \"Experimental or Investigational or Unproven Service, treatment, device or pharmacological regimen is the only available treatment for a particular condition will not result in Benefits if the procedure is considered to be experimental or Investigational or Unproven in the treatment of that particular condition\", however, Louis Raymundo II has completed treatments with his physiatrist, physical therapy, chiropractic treatments, and has been recommended to complete this course of treatment. As Louis Raymundo II is a good candidate for surgery, our practice would like to request a reconsideration of the denial issued to cover these services.     Given the patient's history, condition, and data provided to you, it is my professional opinion that treatment of Lumbosacral spondylosis with radiculopathy M47.27, Lumbar foraminal stenosis M48.061, and Lumbar spondylosis M47.816 with Left MIS L4-5, L5-S1 laminectomy, and transforaminal lumbar interbody fusion is medically appropriate and necessary.     Listed below is a summary of my education and training:    Education: [facility] [facility] [employer] [facility], [place], 2004  Internship: [facility], 2005  Residency: [facility], [place], 2010    If this office may be of further assistance, please do not hesitate to contact us. I look forward to receiving your timely response and approval of this claim.     Sincerely,

## (undated) NOTE — LETTER
24    RE: Louis Sarkarlarissa FONTANEZ     : 1975    Dear Dr. Cerda,    This letter is to inform you that your patient has been scheduled for surgery with Dr. Cardona on 24 at [facility]. Pre-operative clearance has been requested.  We have asked the patient to contact your office to schedule a pre-operative visit.     Diagnosis: lumbar spondylosis   Procedure: Left MIS L4-5, L5-S1 laminectomy, and transforaminal lumbar interbody fusion      A Pre-operative History & Physical is needed for medical clearance within 30 days of surgery. Please address patient's active problems and potential risks of having surgery considering their medical history.  Pre-op labs are scheduled through the [facility] Pre-[facility] department. If any labs/testing are being done through the PCP office, then results should be faxed to the pre-admission testing department at [facility] at [phone]. Our pre-operative lab orders are located in our surgery order, if the patient would like these done through your office, you will need to place separate orders.     Please fax clearance letter/office visit note to our office at fax #: [phone]. Your office note must clearly indicate if the patient is medically cleared for surgery or not.    The following orders will be placed by pre-admission testing:  CBC  CMP  Type and Screen   PT/PTT/INR  MRSA/MSSA Nasal Swab  (*And any other pertinent testing based on patient's current clinical condition.)    If you have any questions, you may contact our office at [phone], option # 2.    Thank you,  Salma MANCERA RN, BSN  Clinical Nurse Lead  [facility]

## (undated) NOTE — Clinical Note
I am having trouble putting in the order for the LEFT L5 and LEFt S1 TFESI because of something on my screen.  Can you please add it to this encounter

## (undated) NOTE — LETTER
24  RE: Louis Raymundo II     : 1975    Dear Dr. Ray,    This letter is to inform you that your patient has been scheduled for surgery with Dr. Cardona on 24 at [facility]. Pre-operative clearance has been requested within 30 days of surgery.  We have asked the patient to contact your office to schedule a pre-operative visit.     Diagnosis: DDD (degenerative disc disease), lumbosacral, Degenerative disc disease, lumbar, Other spondylosis with radiculopathy, lumbar region, Lumbosacral spondylosis with radiculopathy  Procedure: L3-4, L4-5, L5-S1 laminectomy. Left L4-5 and L5-S1 complete facetectomies and transforaminal lumbar interbody fusion     A Pre-operative History & Physical is needed for medical clearance within 30 days of surgery. Please address patient's active problems and potential risks of having surgery considering their medical history.  Pre-op labs are scheduled through the [facility] Pre-[facility] department. If any labs/testing are being done through the PCP office, then results should be faxed to the pre-admission testing department at [facility] at [phone]. Our pre-operative lab orders are located in our surgery order, if the patient would like these done through your office, you will need to place separate orders.     Please fax clearance letter/office visit note to our office at fax #: [phone]. Your office note must clearly indicate if the patient is medically cleared for surgery or not.    The following orders will be placed by pre-admission testing:  CBC  CMP  Type and Screen   PT/PTT/INR  MRSA/MSSA Nasal Swab  EKG  (*And any other pertinent testing based on patient's current clinical condition.)    If you have any questions, you may contact our office at [phone], option # 2.    Thank you,